# Patient Record
Sex: MALE | Race: WHITE | NOT HISPANIC OR LATINO | Employment: OTHER | URBAN - METROPOLITAN AREA
[De-identification: names, ages, dates, MRNs, and addresses within clinical notes are randomized per-mention and may not be internally consistent; named-entity substitution may affect disease eponyms.]

---

## 2023-02-23 LAB — H. PYLORI UBIT: NEGATIVE

## 2023-04-13 ENCOUNTER — HOSPITAL ENCOUNTER (OUTPATIENT)
Dept: DATA CONVERSION | Facility: HOSPITAL | Age: 78
End: 2023-04-13
Attending: ANESTHESIOLOGY | Admitting: ANESTHESIOLOGY
Payer: MEDICARE

## 2023-04-13 DIAGNOSIS — M54.2 CERVICALGIA: ICD-10-CM

## 2023-04-13 DIAGNOSIS — M48.062 SPINAL STENOSIS, LUMBAR REGION WITH NEUROGENIC CLAUDICATION: ICD-10-CM

## 2023-04-13 DIAGNOSIS — N40.0 BENIGN PROSTATIC HYPERPLASIA WITHOUT LOWER URINARY TRACT SYMPTOMS: ICD-10-CM

## 2023-04-13 DIAGNOSIS — J45.909 UNSPECIFIED ASTHMA, UNCOMPLICATED (HHS-HCC): ICD-10-CM

## 2023-04-13 DIAGNOSIS — K21.9 GASTRO-ESOPHAGEAL REFLUX DISEASE WITHOUT ESOPHAGITIS: ICD-10-CM

## 2023-04-13 DIAGNOSIS — E78.5 HYPERLIPIDEMIA, UNSPECIFIED: ICD-10-CM

## 2023-05-16 ENCOUNTER — TELEPHONE (OUTPATIENT)
Dept: PRIMARY CARE | Facility: CLINIC | Age: 78
End: 2023-05-16
Payer: MEDICARE

## 2023-05-16 NOTE — TELEPHONE ENCOUNTER
Pt. States is supposed to get a pain shot in pain management office, but he is on Avelox. Cannot get the shot while on Avelox.

## 2023-05-16 NOTE — TELEPHONE ENCOUNTER
Pt. States is suppose to get a pain shot in pain management but cannot get it while he is on Plavix. Asking if it is ok for him to be off of Plavix for one week, then he can get the shot.

## 2023-05-18 ENCOUNTER — OFFICE VISIT (OUTPATIENT)
Dept: PRIMARY CARE | Facility: CLINIC | Age: 78
End: 2023-05-18
Payer: MEDICARE

## 2023-05-18 VITALS
SYSTOLIC BLOOD PRESSURE: 118 MMHG | WEIGHT: 191.8 LBS | DIASTOLIC BLOOD PRESSURE: 76 MMHG | OXYGEN SATURATION: 96 % | HEART RATE: 56 BPM | BODY MASS INDEX: 25.48 KG/M2

## 2023-05-18 DIAGNOSIS — M25.562 CHRONIC PAIN OF LEFT KNEE: Primary | Chronic | ICD-10-CM

## 2023-05-18 DIAGNOSIS — Z87.898 H/O DIZZINESS: ICD-10-CM

## 2023-05-18 DIAGNOSIS — R47.01 APHASIA: ICD-10-CM

## 2023-05-18 DIAGNOSIS — G45.9 TIA (TRANSIENT ISCHEMIC ATTACK): ICD-10-CM

## 2023-05-18 DIAGNOSIS — E78.5 HYPERLIPIDEMIA, UNSPECIFIED HYPERLIPIDEMIA TYPE: ICD-10-CM

## 2023-05-18 DIAGNOSIS — M17.12 PRIMARY OSTEOARTHRITIS OF LEFT KNEE: ICD-10-CM

## 2023-05-18 DIAGNOSIS — G89.29 CHRONIC PAIN OF LEFT KNEE: Primary | Chronic | ICD-10-CM

## 2023-05-18 DIAGNOSIS — G45.0 VERTEBROBASILAR ARTERY INSUFFICIENCY: ICD-10-CM

## 2023-05-18 PROCEDURE — 1159F MED LIST DOCD IN RCRD: CPT | Performed by: INTERNAL MEDICINE

## 2023-05-18 PROCEDURE — 1036F TOBACCO NON-USER: CPT | Performed by: INTERNAL MEDICINE

## 2023-05-18 PROCEDURE — 20610 DRAIN/INJ JOINT/BURSA W/O US: CPT | Performed by: INTERNAL MEDICINE

## 2023-05-18 PROCEDURE — 99215 OFFICE O/P EST HI 40 MIN: CPT | Performed by: INTERNAL MEDICINE

## 2023-05-18 RX ORDER — PREDNISOLONE ACETATE 10 MG/ML
1 SUSPENSION/ DROPS OPHTHALMIC 4 TIMES DAILY
COMMUNITY
Start: 2023-05-16 | End: 2023-06-13

## 2023-05-18 RX ORDER — TIZANIDINE 4 MG/1
4 TABLET ORAL EVERY 6 HOURS PRN
COMMUNITY
Start: 2023-02-27 | End: 2023-12-28 | Stop reason: ALTCHOICE

## 2023-05-18 RX ORDER — FLUTICASONE PROPIONATE 50 MCG
1-2 SPRAY, SUSPENSION (ML) NASAL DAILY
COMMUNITY
Start: 2022-06-09

## 2023-05-18 RX ORDER — MELOXICAM 15 MG/1
15 TABLET ORAL DAILY PRN
COMMUNITY
End: 2023-08-03 | Stop reason: SDUPTHER

## 2023-05-18 RX ORDER — ALBUTEROL SULFATE 90 UG/1
2 AEROSOL, METERED RESPIRATORY (INHALATION)
COMMUNITY
Start: 2013-06-26

## 2023-05-18 RX ORDER — FLUOCINONIDE 0.5 MG/G
OINTMENT TOPICAL AS NEEDED
COMMUNITY
Start: 2022-06-09 | End: 2023-10-25 | Stop reason: ALTCHOICE

## 2023-05-18 RX ORDER — OMEPRAZOLE 20 MG/1
CAPSULE, DELAYED RELEASE ORAL
COMMUNITY
Start: 2022-09-30 | End: 2023-10-25 | Stop reason: ALTCHOICE

## 2023-05-18 RX ORDER — OMEPRAZOLE 20 MG/1
1 TABLET, DELAYED RELEASE ORAL DAILY
COMMUNITY

## 2023-05-18 RX ORDER — NAPROXEN SODIUM 220 MG/1
1 TABLET, FILM COATED ORAL DAILY
Qty: 29 TABLET | Refills: 0 | COMMUNITY
Start: 2023-05-10 | End: 2023-06-08

## 2023-05-18 RX ORDER — SILDENAFIL 100 MG/1
.5-1 TABLET, FILM COATED ORAL AS NEEDED
COMMUNITY
Start: 2015-05-15 | End: 2023-10-31 | Stop reason: SDUPTHER

## 2023-05-18 RX ORDER — ATORVASTATIN CALCIUM 40 MG/1
40 TABLET, FILM COATED ORAL DAILY
COMMUNITY
Start: 2023-05-10 | End: 2023-09-27 | Stop reason: SDUPTHER

## 2023-05-18 RX ORDER — PROPRANOLOL HYDROCHLORIDE 120 MG/1
120 CAPSULE, EXTENDED RELEASE ORAL
COMMUNITY
Start: 2022-12-04 | End: 2023-10-25 | Stop reason: ALTCHOICE

## 2023-05-18 RX ORDER — MONTELUKAST SODIUM 10 MG/1
1 TABLET ORAL DAILY
COMMUNITY
Start: 2018-05-23 | End: 2023-12-11 | Stop reason: SDUPTHER

## 2023-05-18 RX ORDER — MINERAL OIL
1 ENEMA (ML) RECTAL DAILY
COMMUNITY

## 2023-05-18 RX ORDER — AMITRIPTYLINE HYDROCHLORIDE 10 MG/1
10 TABLET, FILM COATED ORAL NIGHTLY
COMMUNITY
Start: 2023-05-15 | End: 2023-08-31 | Stop reason: ENTERED-IN-ERROR

## 2023-05-18 RX ORDER — AMLODIPINE BESYLATE 2.5 MG/1
2.5 TABLET ORAL DAILY
COMMUNITY
End: 2023-11-08 | Stop reason: SDUPTHER

## 2023-05-18 RX ORDER — MEMANTINE HYDROCHLORIDE 5 MG/1
5 TABLET ORAL 2 TIMES DAILY
COMMUNITY
Start: 2022-09-29 | End: 2024-01-03

## 2023-05-18 RX ORDER — AZELASTINE 1 MG/ML
2 SPRAY, METERED NASAL 2 TIMES DAILY
COMMUNITY
Start: 2018-05-23 | End: 2023-07-17 | Stop reason: SDUPTHER

## 2023-05-18 RX ORDER — TRAZODONE HYDROCHLORIDE 50 MG/1
50 TABLET ORAL AS NEEDED
COMMUNITY
Start: 2018-05-23 | End: 2023-12-04 | Stop reason: SDUPTHER

## 2023-05-18 RX ORDER — PROPRANOLOL HYDROCHLORIDE 10 MG/1
1 TABLET ORAL 3 TIMES DAILY
COMMUNITY
Start: 2023-02-06 | End: 2024-02-20 | Stop reason: SDUPTHER

## 2023-05-18 RX ORDER — DICLOFENAC SODIUM 10 MG/G
GEL TOPICAL 2 TIMES DAILY PRN
COMMUNITY
Start: 2023-02-07

## 2023-05-18 RX ORDER — TOPIRAMATE 25 MG/1
1-2 TABLET ORAL NIGHTLY PRN
COMMUNITY
Start: 2023-05-15 | End: 2024-03-12 | Stop reason: SDUPTHER

## 2023-05-18 RX ORDER — CLOPIDOGREL BISULFATE 75 MG/1
1 TABLET ORAL DAILY
COMMUNITY
Start: 2023-05-10 | End: 2023-09-27 | Stop reason: SDUPTHER

## 2023-05-18 ASSESSMENT — ENCOUNTER SYMPTOMS
MUSCLE WEAKNESS: 0
CONSTITUTIONAL NEGATIVE: 1
TINGLING: 0
SPEECH DIFFICULTY: 0
DIZZINESS: 1
HEADACHES: 0
INABILITY TO BEAR WEIGHT: 1
LOSS OF SENSATION: 0
NUMBNESS: 0
LOSS OF MOTION: 0
WEAKNESS: 0
LIGHT-HEADEDNESS: 0
FACIAL ASYMMETRY: 0

## 2023-05-18 ASSESSMENT — PATIENT HEALTH QUESTIONNAIRE - PHQ9
SUM OF ALL RESPONSES TO PHQ9 QUESTIONS 1 AND 2: 0
1. LITTLE INTEREST OR PLEASURE IN DOING THINGS: NOT AT ALL
2. FEELING DOWN, DEPRESSED OR HOPELESS: NOT AT ALL

## 2023-05-18 NOTE — PROGRESS NOTES
Patient ID: Hank Rick is a 77 y.o. male who presents for Hospital Follow-up (For tia).    /76   Pulse 56   Wt 87 kg (191 lb 12.8 oz)   SpO2 96%   BMI 25.48 kg/m²     Knee Pain   Incident onset: many years before , chronic. Incident location: no particular location. The injury mechanism is unknown. The pain is present in the left knee. The quality of the pain is described as aching. The pain is at a severity of 6/10. The pain is moderate. The pain has been Fluctuating since onset. Associated symptoms include an inability to bear weight. Pertinent negatives include no loss of motion, loss of sensation, muscle weakness, numbness or tingling. The symptoms are aggravated by movement and weight bearing. He has tried acetaminophen and NSAIDs for the symptoms. The treatment provided mild relief.       Subjective     Review of Systems   Constitutional: Negative.    Musculoskeletal:         Left knee pain , hurts to walk or stand on it for long time , no swelling  cortisone injection helps    Neurological:  Positive for dizziness. Negative for tingling, facial asymmetry, speech difficulty, weakness, light-headedness, numbness and headaches.   All other systems reviewed and are negative.      Objective     Physical Exam  Vitals and nursing note reviewed.   Constitutional:       Appearance: Normal appearance.   Neck:      Vascular: No carotid bruit.   Cardiovascular:      Rate and Rhythm: Normal rate and regular rhythm.      Pulses: Normal pulses.      Heart sounds: Normal heart sounds. No murmur heard.  Pulmonary:      Effort: Pulmonary effort is normal.      Breath sounds: Normal breath sounds.   Musculoskeletal:      Comments: Left knee rom painful , coarse crepitations noted , no tenderness , no erythema    Neurological:      General: No focal deficit present.      Mental Status: He is oriented to person, place, and time. Mental status is at baseline.      Gait: Gait normal.      Deep Tendon Reflexes:  Reflexes normal.   Psychiatric:         Mood and Affect: Mood normal.         Behavior: Behavior normal.         Thought Content: Thought content normal.         Judgment: Judgment normal.         Lab Results   Component Value Date    WBC 7.6 05/12/2023    HGB 13.9 05/12/2023    HCT 40.9 (L) 05/12/2023    MCV 92 05/12/2023     05/12/2023           Problem List Items Addressed This Visit          Nervous    Chronic pain of left knee - Primary       Circulatory    Vertebrobasilar artery insufficiency       Musculoskeletal    Primary osteoarthritis of left knee       Other    H/O dizziness     Other Visit Diagnoses       TIA (transient ischemic attack)        Relevant Orders    Vascular US carotid artery duplex bilateral    Aphasia        Relevant Orders    Vascular US carotid artery duplex bilateral    Hyperlipidemia, unspecified hyperlipidemia type        Relevant Orders    Lipid panel                A/P         WITH PT'S CONSENT AND AFTER STERILIZING THE SKIN OF THE LEFT KNEE WITH ALCOHOL SWABX 4 AND ADEQUATELY SPRAYING WITH TOPICAL ANESTHETIC , INJECTION KENALOG 80 + 1 ml 1% LIDOCAINE INJECTED INTO THE AFFECTED KNEE, PT TOLERATED THE PROCEDURE WELL , NO COMPLICATIONS NOTED AND PAIN IS RELIEVED       Patient ID: Hank Rick is a 77 y.o. male.    Joint Injection Large/Arthrocentesis: L knee on 5/18/2023 1:25 PM  Indications: pain  Details: 22 G needle, anteromedial approach (guidance: MANUAL GUIDANCE )  Medications: (With patient's consent and after sterilizing the skin of the left knee adequately with alcohol swab x4 and after spraying with topical anesthetic adequately to the affected area of the left knee injection Kenalog 80 mg+ 1 cc 1% lidocaine injected into the left knee patient tolerated the procedure very well relieved of the pain no complications noted)  Outcome: tolerated well, no immediate complications  Procedure, treatment alternatives, risks and benefits explained, specific risks discussed.  Consent was given by the patient.       Follow up if needed

## 2023-05-19 ENCOUNTER — HOSPITAL ENCOUNTER (OUTPATIENT)
Dept: DATA CONVERSION | Facility: HOSPITAL | Age: 78
End: 2023-05-19
Attending: ANESTHESIOLOGY
Payer: MEDICARE

## 2023-05-19 DIAGNOSIS — M54.16 RADICULOPATHY, LUMBAR REGION: ICD-10-CM

## 2023-05-19 DIAGNOSIS — Z96.619 PRESENCE OF UNSPECIFIED ARTIFICIAL SHOULDER JOINT: ICD-10-CM

## 2023-05-19 DIAGNOSIS — R52 PAIN, UNSPECIFIED: ICD-10-CM

## 2023-05-19 DIAGNOSIS — M48.061 SPINAL STENOSIS, LUMBAR REGION WITHOUT NEUROGENIC CLAUDICATION: ICD-10-CM

## 2023-06-06 ENCOUNTER — HOSPITAL ENCOUNTER (OUTPATIENT)
Dept: DATA CONVERSION | Facility: HOSPITAL | Age: 78
End: 2023-06-06
Attending: ANESTHESIOLOGY
Payer: MEDICARE

## 2023-06-06 DIAGNOSIS — M54.16 RADICULOPATHY, LUMBAR REGION: ICD-10-CM

## 2023-06-06 DIAGNOSIS — M48.061 SPINAL STENOSIS, LUMBAR REGION WITHOUT NEUROGENIC CLAUDICATION: ICD-10-CM

## 2023-06-23 ENCOUNTER — HOSPITAL ENCOUNTER (OUTPATIENT)
Dept: DATA CONVERSION | Facility: HOSPITAL | Age: 78
End: 2023-06-23
Attending: ANESTHESIOLOGY
Payer: MEDICARE

## 2023-06-23 DIAGNOSIS — M54.16 RADICULOPATHY, LUMBAR REGION: ICD-10-CM

## 2023-06-23 DIAGNOSIS — M48.061 SPINAL STENOSIS, LUMBAR REGION WITHOUT NEUROGENIC CLAUDICATION: ICD-10-CM

## 2023-07-17 DIAGNOSIS — J30.9 CHRONIC ALLERGIC RHINITIS: Primary | ICD-10-CM

## 2023-07-17 RX ORDER — AZELASTINE 1 MG/ML
2 SPRAY, METERED NASAL 2 TIMES DAILY
Qty: 30 ML | Refills: 2 | Status: SHIPPED | OUTPATIENT
Start: 2023-07-17 | End: 2023-10-04 | Stop reason: SDUPTHER

## 2023-07-20 ENCOUNTER — OFFICE VISIT (OUTPATIENT)
Dept: PRIMARY CARE | Facility: CLINIC | Age: 78
End: 2023-07-20
Payer: MEDICARE

## 2023-07-20 VITALS
OXYGEN SATURATION: 95 % | WEIGHT: 195.6 LBS | DIASTOLIC BLOOD PRESSURE: 80 MMHG | SYSTOLIC BLOOD PRESSURE: 130 MMHG | BODY MASS INDEX: 25.98 KG/M2 | HEART RATE: 56 BPM

## 2023-07-20 DIAGNOSIS — G45.0 VERTEBROBASILAR ARTERY INSUFFICIENCY: Primary | ICD-10-CM

## 2023-07-20 DIAGNOSIS — M25.471 SWELLING OF BOTH ANKLES: ICD-10-CM

## 2023-07-20 DIAGNOSIS — M25.472 SWELLING OF BOTH ANKLES: ICD-10-CM

## 2023-07-20 DIAGNOSIS — Z87.898 H/O DIZZINESS: ICD-10-CM

## 2023-07-20 PROCEDURE — 1036F TOBACCO NON-USER: CPT | Performed by: INTERNAL MEDICINE

## 2023-07-20 PROCEDURE — 1160F RVW MEDS BY RX/DR IN RCRD: CPT | Performed by: INTERNAL MEDICINE

## 2023-07-20 PROCEDURE — 1159F MED LIST DOCD IN RCRD: CPT | Performed by: INTERNAL MEDICINE

## 2023-07-20 PROCEDURE — 99212 OFFICE O/P EST SF 10 MIN: CPT | Performed by: INTERNAL MEDICINE

## 2023-07-20 PROCEDURE — 1126F AMNT PAIN NOTED NONE PRSNT: CPT | Performed by: INTERNAL MEDICINE

## 2023-07-20 RX ORDER — PROPRANOLOL HYDROCHLORIDE 60 MG/1
60 CAPSULE, EXTENDED RELEASE ORAL DAILY
COMMUNITY
Start: 2023-06-30 | End: 2023-12-15 | Stop reason: SDUPTHER

## 2023-07-20 ASSESSMENT — PATIENT HEALTH QUESTIONNAIRE - PHQ9
SUM OF ALL RESPONSES TO PHQ9 QUESTIONS 1 AND 2: 0
2. FEELING DOWN, DEPRESSED OR HOPELESS: NOT AT ALL
1. LITTLE INTEREST OR PLEASURE IN DOING THINGS: NOT AT ALL

## 2023-07-20 ASSESSMENT — ENCOUNTER SYMPTOMS: CONSTITUTIONAL NEGATIVE: 1

## 2023-07-20 NOTE — PROGRESS NOTES
Patient ID: Hank Rick is a 78 y.o. male who presents for Joint Swelling (Both ankles are swollen).    /80   Pulse 56   Wt 88.7 kg (195 lb 9.6 oz)   SpO2 95%   BMI 25.98 kg/m²     HPI      BILATERAL ANKLE SWELLING   NO PND, NO ORTHOPNEA   LEG SWELLING BETTER   AFTER KEEPING IT ELEVATED       Subjective     Review of Systems   Constitutional: Negative.    Cardiovascular:  Positive for leg swelling.   All other systems reviewed and are negative.      Objective     Physical Exam    Lab Results   Component Value Date    WBC 7.6 05/12/2023    HGB 13.9 05/12/2023    HCT 40.9 (L) 05/12/2023    MCV 92 05/12/2023     05/12/2023           Problem List Items Addressed This Visit       H/O dizziness    Vertebrobasilar artery insufficiency - Primary    Swelling of both ankles            A/P       ADVISED TO KEEP THE AFFECTED LEGS ELEVATED   ADVISED TO USE BELOW KNEE COMPRESSION STOCKINGS   ADVISED TO AVOID EXTRA SALT

## 2023-08-03 DIAGNOSIS — M17.12 PRIMARY OSTEOARTHRITIS OF LEFT KNEE: ICD-10-CM

## 2023-08-03 DIAGNOSIS — G89.29 CHRONIC PAIN OF LEFT KNEE: ICD-10-CM

## 2023-08-03 DIAGNOSIS — M25.562 CHRONIC PAIN OF LEFT KNEE: ICD-10-CM

## 2023-08-03 RX ORDER — MELOXICAM 15 MG/1
15 TABLET ORAL DAILY PRN
Qty: 30 TABLET | Refills: 1 | Status: SHIPPED | OUTPATIENT
Start: 2023-08-03 | End: 2023-10-04

## 2023-08-21 ENCOUNTER — HOSPITAL ENCOUNTER (OUTPATIENT)
Dept: DATA CONVERSION | Facility: HOSPITAL | Age: 78
End: 2023-08-21

## 2023-08-21 DIAGNOSIS — G47.33 OBSTRUCTIVE SLEEP APNEA (ADULT) (PEDIATRIC): ICD-10-CM

## 2023-08-25 ENCOUNTER — PATIENT OUTREACH (OUTPATIENT)
Dept: PRIMARY CARE | Facility: CLINIC | Age: 78
End: 2023-08-25
Payer: MEDICARE

## 2023-08-25 RX ORDER — LIDOCAINE 40 MG/G
1 CREAM TOPICAL DAILY
COMMUNITY
End: 2023-10-25 | Stop reason: ALTCHOICE

## 2023-08-25 RX ORDER — SENNOSIDES 8.6 MG/1
1 TABLET ORAL DAILY
COMMUNITY
End: 2023-10-25 | Stop reason: ALTCHOICE

## 2023-08-25 RX ORDER — BENZONATATE 100 MG/1
100 CAPSULE ORAL 3 TIMES DAILY PRN
COMMUNITY
End: 2023-10-25 | Stop reason: ALTCHOICE

## 2023-08-25 RX ORDER — DOCUSATE SODIUM 100 MG/1
100 CAPSULE, LIQUID FILLED ORAL 2 TIMES DAILY
Status: ON HOLD | COMMUNITY
End: 2023-11-06 | Stop reason: ALTCHOICE

## 2023-08-25 NOTE — PROGRESS NOTES
Discharge Facility:  [facility]  Discharge Diagnosis:   Syncope  Admission Date:  8/21/23  Discharge Date:   8/24/23    PCP Appointment Date:  8/31/23    Specialist Appointment Date:   Physician/Dept/Service:   Dr Darek Sánchez / Neurology          Scheduled Date/Time:   07-Sep-2023 14:30          Location:   [address] [place] [address] ,  [place] [id number] Fax:[phone]          Phone Number:   [phone]    Physician/Dept/Service:   Ortho Surgery- Dr Red    Physician/Dept/Service:   Pain Medicine-Dr Flores          Scheduled Date/Time:   30-Aug-2023 11:15          Location:   [facility] [address] [place] [address]    Physician/Dept/Service:   Orthopedics          Scheduled Date/Time:   25-Sep-2023 08:20          Location:   [facility] [place]    Physician/Dept/Service:   Otolaryngology-Luiz          Scheduled Date/Time:   28-Sep-2023 11:00          Location:   Otolaryngology [facility] Encounter and Summary: Linked   See discharge assessment below for further details     Engagement  Call Start Time: 0942 (8/25/2023  9:43 AM)    Medications  Medications reviewed with patient/caregiver?: Yes (8/25/2023  9:43 AM)  Is the patient having any side effects they believe may be caused by any medication additions or changes?: No (8/25/2023  9:43 AM)  Does the patient have all medications ordered at discharge?: Yes (8/25/2023  9:43 AM)  Is the patient taking all medications as directed (includes completed medication regime)?: Yes (8/25/2023  9:43 AM)  Medication Comments: reviewed new, changed, and discontinued meds.  benzonatate, lidocaine, colace, and senna are new. (8/25/2023  9:43 AM)    Appointments  Does the patient have a primary care provider?: Yes (8/25/2023  9:43 AM)  Care Management Interventions: -- (appt made 8/31) (8/25/2023  9:43 AM)  Has the patient kept scheduled appointments due by today?: Yes (8/25/2023  9:43 AM)  Care Management Interventions: Educated on  importance of keeping appointment (8/25/2023  9:43 AM)    Self Management  What is the home health agency?: [facility] (8/25/2023  9:43 AM)  Has home health visited the patient within 72 hours of discharge?: Yes (8/25/2023  9:43 AM)    Patient Teaching  Does the patient have access to their discharge instructions?: Yes (8/25/2023  9:43 AM)  What is the patient's perception of their health status since discharge?: Improving (8/25/2023  9:43 AM)  Is the patient/caregiver able to teach back the hierarchy of who to call/visit for symptoms/problems? PCP, Specialist, Home Health nurse, Urgent Care, ED, 911: Yes (8/25/2023  9:43 AM)  Patient/Caregiver Education Comments: states doing better since discharge.  has all needed meds.  no questions or concerns at this time.  aware to call if needed. (8/25/2023  9:43 AM)

## 2023-08-31 ENCOUNTER — OFFICE VISIT (OUTPATIENT)
Dept: PRIMARY CARE | Facility: CLINIC | Age: 78
End: 2023-08-31
Payer: MEDICARE

## 2023-08-31 VITALS
HEART RATE: 56 BPM | BODY MASS INDEX: 25.19 KG/M2 | WEIGHT: 189.6 LBS | OXYGEN SATURATION: 96 % | DIASTOLIC BLOOD PRESSURE: 78 MMHG | SYSTOLIC BLOOD PRESSURE: 136 MMHG

## 2023-08-31 DIAGNOSIS — E78.5 HYPERLIPIDEMIA, UNSPECIFIED HYPERLIPIDEMIA TYPE: ICD-10-CM

## 2023-08-31 DIAGNOSIS — I65.02 VERTEBRAL ARTERY STENOSIS/OCCLUSION, LEFT: ICD-10-CM

## 2023-08-31 DIAGNOSIS — E87.1 HYPONATREMIA: ICD-10-CM

## 2023-08-31 DIAGNOSIS — G45.0 VERTEBROBASILAR ARTERY INSUFFICIENCY: Primary | ICD-10-CM

## 2023-08-31 DIAGNOSIS — I10 PRIMARY HYPERTENSION: ICD-10-CM

## 2023-08-31 DIAGNOSIS — Z87.898 H/O DIZZINESS: ICD-10-CM

## 2023-08-31 DIAGNOSIS — R35.0 URINARY FREQUENCY: ICD-10-CM

## 2023-08-31 LAB
ANION GAP IN SER/PLAS: 13 MMOL/L (ref 10–20)
CALCIUM (MG/DL) IN SER/PLAS: 9.7 MG/DL (ref 8.6–10.6)
CARBON DIOXIDE, TOTAL (MMOL/L) IN SER/PLAS: 28 MMOL/L (ref 21–32)
CHLORIDE (MMOL/L) IN SER/PLAS: 100 MMOL/L (ref 98–107)
CHOLESTEROL (MG/DL) IN SER/PLAS: 151 MG/DL (ref 0–199)
CHOLESTEROL IN HDL (MG/DL) IN SER/PLAS: 39.7 MG/DL
CHOLESTEROL/HDL RATIO: 3.8
CREATININE (MG/DL) IN SER/PLAS: 1.09 MG/DL (ref 0.5–1.3)
GFR MALE: 69 ML/MIN/1.73M2
GLUCOSE (MG/DL) IN SER/PLAS: 123 MG/DL (ref 74–99)
LDL: 83 MG/DL (ref 0–99)
POTASSIUM (MMOL/L) IN SER/PLAS: 4.5 MMOL/L (ref 3.5–5.3)
SODIUM (MMOL/L) IN SER/PLAS: 136 MMOL/L (ref 136–145)
TRIGLYCERIDE (MG/DL) IN SER/PLAS: 144 MG/DL (ref 0–149)
UREA NITROGEN (MG/DL) IN SER/PLAS: 17 MG/DL (ref 6–23)
VLDL: 29 MG/DL (ref 0–40)

## 2023-08-31 PROCEDURE — 1126F AMNT PAIN NOTED NONE PRSNT: CPT | Performed by: INTERNAL MEDICINE

## 2023-08-31 PROCEDURE — 80048 BASIC METABOLIC PNL TOTAL CA: CPT

## 2023-08-31 PROCEDURE — 1160F RVW MEDS BY RX/DR IN RCRD: CPT | Performed by: INTERNAL MEDICINE

## 2023-08-31 PROCEDURE — 99215 OFFICE O/P EST HI 40 MIN: CPT | Performed by: INTERNAL MEDICINE

## 2023-08-31 PROCEDURE — 3078F DIAST BP <80 MM HG: CPT | Performed by: INTERNAL MEDICINE

## 2023-08-31 PROCEDURE — 80061 LIPID PANEL: CPT

## 2023-08-31 PROCEDURE — 1036F TOBACCO NON-USER: CPT | Performed by: INTERNAL MEDICINE

## 2023-08-31 PROCEDURE — 3075F SYST BP GE 130 - 139MM HG: CPT | Performed by: INTERNAL MEDICINE

## 2023-08-31 PROCEDURE — 1159F MED LIST DOCD IN RCRD: CPT | Performed by: INTERNAL MEDICINE

## 2023-08-31 ASSESSMENT — ENCOUNTER SYMPTOMS
SPEECH DIFFICULTY: 0
DIZZINESS: 0
LIGHT-HEADEDNESS: 0
NUMBNESS: 0
CONSTITUTIONAL NEGATIVE: 1
HEADACHES: 0
TREMORS: 1
SEIZURES: 0

## 2023-08-31 ASSESSMENT — PATIENT HEALTH QUESTIONNAIRE - PHQ9
SUM OF ALL RESPONSES TO PHQ9 QUESTIONS 1 AND 2: 0
1. LITTLE INTEREST OR PLEASURE IN DOING THINGS: NOT AT ALL
SUM OF ALL RESPONSES TO PHQ9 QUESTIONS 1 AND 2: 0
1. LITTLE INTEREST OR PLEASURE IN DOING THINGS: NOT AT ALL
2. FEELING DOWN, DEPRESSED OR HOPELESS: NOT AT ALL
2. FEELING DOWN, DEPRESSED OR HOPELESS: NOT AT ALL

## 2023-08-31 NOTE — PROGRESS NOTES
Patient ID: Hank Rick is a 78 y.o. male who presents for Hospital Follow-up (Fell on Sunday,had some scrapes and cuts. He was talking incoherently, and tested positive for covid).    /78   Pulse 56   Wt 86 kg (189 lb 9.6 oz)   SpO2 96%   BMI 25.19 kg/m²     HPI      PT SEEN IN THE EMERGENCY AT [facility] AT 03:49 AM   FOR UNWITNESSED FALL, NO LOC AND INCOHERENT SPEECH   NO WITNESSED SEIZURE , NO DYSARTHRIA, NO EXTREMITY WEAKNESS  NO BLURRY VISION , NO DOUBLE VISION     PT HAD TIA ON 05/23/2023     NO DM , HTN STABLE     NO FHX OF STROKE     PT HAD CT ANGIO HEAD/NECK WAND W/O IV CONTRAST LEFT VERTEBRAL ARTERY STENOSIS     PT HAD HOLTER MONITOR FOR 14 DAYS   HE HAS APPT WITH NEUROLOGY ON 09/07/2023       Subjective     Review of Systems   Constitutional: Negative.    Neurological:  Positive for tremors. Negative for dizziness, seizures, speech difficulty, light-headedness, numbness and headaches.   All other systems reviewed and are negative.      Objective     Physical Exam  Vitals and nursing note reviewed.   Cardiovascular:      Rate and Rhythm: Normal rate and regular rhythm.      Pulses: Normal pulses.      Heart sounds: Normal heart sounds. No murmur heard.        Lab Results   Component Value Date    WBC 8.7 08/24/2023    HGB 12.5 (L) 08/24/2023    HCT 36.9 (L) 08/24/2023    MCV 94 08/24/2023     08/24/2023           Problem List Items Addressed This Visit       H/O dizziness    Vertebrobasilar artery insufficiency - Primary    Vertebral artery stenosis/occlusion, left    Urinary frequency    Relevant Orders    Referral to Urology    Primary hypertension    Hyponatremia    Relevant Orders    Basic metabolic panel           A/P       RECORD FROM THE HOSPITAL REVIEWED   PT SCHEDULED TO SEE NEUROLOGY ON 09/07/2023  TO CONTINUE ASPIRIN 81MG 1 PILL EVERY DAY   TO CONTINUE PLAVIX 75MG 1 PILL EVERY DAY   PT ON HOLTER MONITOR FOR 14 DAYS   TO DC AMITRIPTYLINE   PT MOST LIKELY WILL NEED EEG  FOLLOW UP IF  NEEDED

## 2023-09-01 ENCOUNTER — PATIENT OUTREACH (OUTPATIENT)
Dept: PRIMARY CARE | Facility: CLINIC | Age: 78
End: 2023-09-01
Payer: MEDICARE

## 2023-09-07 VITALS — BODY MASS INDEX: 25.71 KG/M2 | HEIGHT: 73 IN | WEIGHT: 194 LBS

## 2023-09-13 ENCOUNTER — APPOINTMENT (OUTPATIENT)
Dept: PRIMARY CARE | Facility: CLINIC | Age: 78
End: 2023-09-13
Payer: MEDICARE

## 2023-09-14 NOTE — H&P
History of Present Illness:   History Present Illness:  Reason for surgery: Interspinous spacer   HPI:    77 year old male presents with low back pain. He has a history of cervical spondylosis and radicular pain to the right upper extremity plus lumbar  stenosis with neurogenic claudication and with radicular symptoms. He underwent L4 transformational epidural steroid injections on 12/6/22. His most recent lumbar MRI showed moderate central canal stenosis at L3/4 and L4/5.      He has responded to steroid injections in both cervical and lumbar regions in the past however patient reports last injection in December did not provide significant relief  of back pain. Despite this, he does report that radicular leg symptoms are less (he reports only occasional tingling in the right anterior thigh). He denies significant weakness in upper or lower extremities. Patient reports rotator cuff damage and had  recently underwent surgery. He has seen Dr. Orr previously for back and radicular pain who suggested deferring surgery until other less invasive options pursued. Patient reports pain today is mostly in low back and right shoulder. He states that he only  takes Meloxicam and Tylenol for pain currently and that Gabapentin was tried in the past but caused him significant agitation he has tried topiramate in the past as well without significant relief.    Allergies:        Allergies:  ·  No Known Allergies :        Intolerances:  ·  gabapentin : Anxiety    Home Medication Review:   Home Medications Reviewed: yes     Impression/Procedure:   ·  Impression and Planned Procedure: vertiflex       ERAS (Enhanced Recovery After Surgery):  ·  ERAS Patient: no       Physical Exam by System:    Constitutional: Well developed, awake/alert/oriented  x3, no distress, alert and cooperative   Eyes: PERRL, EOMI, clear sclera   ENMT: mucous membranes moist, no apparent injury,  no lesions seen   Head/Neck: Neck supple, no apparent injury,  thyroid  without mass or tenderness, No JVD, trachea midline, no bruits   Respiratory/Thorax: Patent airways, CTAB, normal  breath sounds with good chest expansion, thorax symmetric   Cardiovascular: Regular, rate and rhythm, no murmurs,  2+ equal pulses of the extremities, normal S 1and S 2   Gastrointestinal: Nondistended, soft, non-tender,  no rebound tenderness or guarding, no masses palpable, no organomegaly, +BS, no bruits   Extremities: normal extremities, no cyanosis edema,  contusions or wounds, no clubbing   Neurological: alert and oriented x3, intact senses,  motor, response and reflexes, normal strength   Skin: Warm and dry, no lesions, no rashes     Consent:   COVID-19 Consent:  ·  COVID-19 Risk Consent Surgeon has reviewed key risks related to the risk of beto COVID-19 and if they contract COVID-19 what the risks are.     Attestation:   Note Completion:  I am a:  Resident/Fellow   Attending Attestation I saw and evaluated the patient.  I personally obtained the key and critical portions of the history and physical exam or was physically present for key and  critical portions performed by the resident/fellow. I reviewed the resident/fellow?s documentation and discussed the patient with the resident/fellow.  I agree with the resident/fellow?s medical decision making as documented in the note.   I personally evaluated the patient on 13-Apr-2023         Electronic Signatures:  Angie Carreon (Fellow))   (Signed 13-Apr-2023 09:40)   Authored: History of Present Illness, Allergies, Home Medication Review, Impression/Procedure, Physical Exam, ERAS, Consent, Note Completion  Arben Flores)   (Signed 13-Apr-2023 13:58)   Co-Signer: History of Present Illness, Allergies, Home Medication Review, Impression/Procedure, Physical Exam, ERAS, Consent, Note Completion    Last Updated: 13-Apr-2023 13:58 by Arben Flores)

## 2023-09-15 ENCOUNTER — HOSPITAL ENCOUNTER (OUTPATIENT)
Facility: CLINIC | Age: 78
Setting detail: OUTPATIENT SURGERY
End: 2023-09-15
Payer: MEDICARE

## 2023-09-25 ENCOUNTER — PATIENT OUTREACH (OUTPATIENT)
Dept: PRIMARY CARE | Facility: CLINIC | Age: 78
End: 2023-09-25
Payer: MEDICARE

## 2023-09-25 NOTE — PROGRESS NOTES
Call placed regarding one month post discharge follow up call.  At time of outreach call the patient feels as if their condition has improved since initial visit with PCP or specialist.  Denies questions or concerns at this time.  Aware to call if needs arise.

## 2023-09-27 ENCOUNTER — TELEPHONE (OUTPATIENT)
Dept: PRIMARY CARE | Facility: CLINIC | Age: 78
End: 2023-09-27
Payer: MEDICARE

## 2023-09-27 DIAGNOSIS — E78.5 HYPERLIPIDEMIA, UNSPECIFIED HYPERLIPIDEMIA TYPE: Primary | ICD-10-CM

## 2023-09-27 DIAGNOSIS — G45.9 TIA (TRANSIENT ISCHEMIC ATTACK): ICD-10-CM

## 2023-09-27 DIAGNOSIS — Z86.79: ICD-10-CM

## 2023-09-27 RX ORDER — ATORVASTATIN CALCIUM 40 MG/1
40 TABLET, FILM COATED ORAL DAILY
Qty: 90 TABLET | Refills: 2 | Status: SHIPPED | OUTPATIENT
Start: 2023-09-27 | End: 2024-04-29

## 2023-09-27 RX ORDER — CLOPIDOGREL BISULFATE 75 MG/1
75 TABLET ORAL DAILY
Qty: 90 TABLET | Refills: 2 | Status: SHIPPED | OUTPATIENT
Start: 2023-09-27 | End: 2024-01-04

## 2023-09-27 NOTE — TELEPHONE ENCOUNTER
Pt. Requesting refill of Atorvastatin. Also is asking if he should be on Clopidogrel. States he had stopped taking it for some reason, he can't remember, but  they restarted it at the hospital on 8/24/23.

## 2023-09-29 PROBLEM — K22.2 ESOPHAGEAL STRICTURE: Status: ACTIVE | Noted: 2023-09-29

## 2023-09-29 PROBLEM — M47.812 FACET ARTHROPATHY, CERVICAL: Status: ACTIVE | Noted: 2023-09-29

## 2023-09-29 PROBLEM — R79.89 ELEVATED SERUM CREATININE: Status: ACTIVE | Noted: 2023-09-29

## 2023-09-29 PROBLEM — K63.5 SERRATED POLYP OF COLON: Status: ACTIVE | Noted: 2023-09-29

## 2023-09-29 PROBLEM — R35.1 NOCTURIA: Status: ACTIVE | Noted: 2023-09-29

## 2023-09-29 PROBLEM — R13.10 DYSPHAGIA: Status: ACTIVE | Noted: 2023-09-29

## 2023-09-29 PROBLEM — R41.89 COGNITIVE CHANGES: Status: ACTIVE | Noted: 2023-09-29

## 2023-09-29 PROBLEM — D64.9 ANEMIA, UNSPECIFIED: Status: ACTIVE | Noted: 2023-09-29

## 2023-09-29 PROBLEM — N41.9 PROSTATITIS: Status: ACTIVE | Noted: 2023-09-29

## 2023-09-29 PROBLEM — I63.9 CEREBROVASCULAR ACCIDENT (CVA) (MULTI): Status: ACTIVE | Noted: 2023-09-29

## 2023-09-29 PROBLEM — M43.02 CERVICAL SPONDYLOLYSIS: Status: ACTIVE | Noted: 2023-09-29

## 2023-09-29 PROBLEM — T14.8XXA BLEEDING FROM WOUND: Status: ACTIVE | Noted: 2023-09-29

## 2023-09-29 PROBLEM — R39.15 BENIGN PROSTATIC HYPERPLASIA (BPH) WITH URINARY URGENCY: Status: ACTIVE | Noted: 2023-09-29

## 2023-09-29 PROBLEM — M54.6 THORACIC BACK PAIN: Status: ACTIVE | Noted: 2023-09-29

## 2023-09-29 PROBLEM — L08.1 ERYTHRASMA: Status: ACTIVE | Noted: 2023-09-29

## 2023-09-29 PROBLEM — S61.259A DOG BITE OF FINGER: Status: ACTIVE | Noted: 2023-09-29

## 2023-09-29 PROBLEM — J30.2 SEASONAL ALLERGIES: Status: ACTIVE | Noted: 2023-09-29

## 2023-09-29 PROBLEM — N40.1 BENIGN NON-NODULAR PROSTATIC HYPERPLASIA WITH LOWER URINARY TRACT SYMPTOMS: Status: ACTIVE | Noted: 2023-09-29

## 2023-09-29 PROBLEM — R41.840 COVID-19 LONG HAULER MANIFESTING CHRONIC CONCENTRATION DEFICIT: Status: ACTIVE | Noted: 2023-09-29

## 2023-09-29 PROBLEM — F02.80 MIXED ALZHEIMER'S AND VASCULAR DEMENTIA (MULTI): Status: ACTIVE | Noted: 2023-09-29

## 2023-09-29 PROBLEM — S60.419A FINGER ABRASION: Status: ACTIVE | Noted: 2023-09-29

## 2023-09-29 PROBLEM — M25.511 SHOULDER PAIN, RIGHT: Status: ACTIVE | Noted: 2023-09-29

## 2023-09-29 PROBLEM — R55 SYNCOPE: Status: ACTIVE | Noted: 2023-09-29

## 2023-09-29 PROBLEM — S61.419A LACERATION OF HAND: Status: ACTIVE | Noted: 2023-09-29

## 2023-09-29 PROBLEM — R79.89 ELEVATED FERRITIN: Status: ACTIVE | Noted: 2023-09-29

## 2023-09-29 PROBLEM — G25.0 ESSENTIAL TREMOR: Status: ACTIVE | Noted: 2023-09-29

## 2023-09-29 PROBLEM — K29.70 GASTRITIS: Status: ACTIVE | Noted: 2023-09-29

## 2023-09-29 PROBLEM — H26.9 INCIPIENT CATARACT: Status: ACTIVE | Noted: 2018-01-06

## 2023-09-29 PROBLEM — L70.9 ACNE: Status: ACTIVE | Noted: 2023-09-29

## 2023-09-29 PROBLEM — F51.04 CHRONIC INSOMNIA: Status: ACTIVE | Noted: 2023-09-29

## 2023-09-29 PROBLEM — G47.00 INSOMNIA: Status: ACTIVE | Noted: 2023-09-29

## 2023-09-29 PROBLEM — M54.16 LUMBAR RADICULOPATHY: Status: ACTIVE | Noted: 2023-09-29

## 2023-09-29 PROBLEM — K21.9 GERD (GASTROESOPHAGEAL REFLUX DISEASE): Status: ACTIVE | Noted: 2023-09-29

## 2023-09-29 PROBLEM — M48.062 LUMBAR STENOSIS WITH NEUROGENIC CLAUDICATION: Status: ACTIVE | Noted: 2023-09-29
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
## 2023-09-29 PROBLEM — R26.81 UNSTEADY GAIT: Status: ACTIVE | Noted: 2023-09-29

## 2023-09-29 PROBLEM — F01.50 MIXED ALZHEIMER'S AND VASCULAR DEMENTIA (MULTI): Status: ACTIVE | Noted: 2023-09-29

## 2023-09-29 PROBLEM — R41.3 MEMORY LOSS: Status: ACTIVE | Noted: 2023-09-29

## 2023-09-29 PROBLEM — G30.9 MIXED ALZHEIMER'S AND VASCULAR DEMENTIA (MULTI): Status: ACTIVE | Noted: 2023-09-29

## 2023-09-29 PROBLEM — H10.89 GPC (GIANT PAPILLARY CONJUNCTIVITIS): Status: ACTIVE | Noted: 2018-01-06

## 2023-09-29 PROBLEM — S39.012A LUMBAR STRAIN: Status: ACTIVE | Noted: 2023-09-29
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
## 2023-09-29 PROBLEM — M79.18 MYOFASCIAL PAIN SYNDROME, CERVICAL: Status: ACTIVE | Noted: 2023-09-29

## 2023-09-29 PROBLEM — R21 SKIN RASH: Status: ACTIVE | Noted: 2023-09-29

## 2023-09-29 PROBLEM — R35.0 INCREASED URINARY FREQUENCY: Status: ACTIVE | Noted: 2023-09-29

## 2023-09-29 PROBLEM — G47.30 SLEEP APNEA: Status: ACTIVE | Noted: 2023-09-29

## 2023-09-29 PROBLEM — R47.01 APHASIA: Status: ACTIVE | Noted: 2023-09-29

## 2023-09-29 PROBLEM — E88.09 HYPOALBUMINEMIA: Status: ACTIVE | Noted: 2023-09-29

## 2023-09-29 PROBLEM — R41.9 COGNITIVE COMPLAINTS WITH NORMAL NEUROPSYCHOLOGICAL EXAM: Status: ACTIVE | Noted: 2023-09-29

## 2023-09-29 PROBLEM — R13.10 DYSPHAGIA, IDIOPATHIC: Status: ACTIVE | Noted: 2023-09-29

## 2023-09-29 PROBLEM — M70.62 GREATER TROCHANTERIC BURSITIS, LEFT: Status: ACTIVE | Noted: 2023-09-29

## 2023-09-29 PROBLEM — G45.9 TIA (TRANSIENT ISCHEMIC ATTACK): Status: ACTIVE | Noted: 2023-09-29

## 2023-09-29 PROBLEM — L57.0 ACTINIC KERATOSIS: Status: ACTIVE | Noted: 2017-07-07

## 2023-09-29 PROBLEM — G47.33 OSA (OBSTRUCTIVE SLEEP APNEA): Status: ACTIVE | Noted: 2023-05-09

## 2023-09-29 PROBLEM — R26.89 POOR BALANCE: Status: ACTIVE | Noted: 2023-09-29

## 2023-09-29 PROBLEM — S61.459A DOG BITE, HAND: Status: ACTIVE | Noted: 2023-09-29

## 2023-09-29 PROBLEM — M17.11 PRIMARY OSTEOARTHRITIS OF RIGHT KNEE: Status: ACTIVE | Noted: 2023-09-29

## 2023-09-29 PROBLEM — R79.89 OTHER SPECIFIED ABNORMAL FINDINGS OF BLOOD CHEMISTRY: Status: ACTIVE | Noted: 2023-09-29

## 2023-09-29 PROBLEM — M54.12 CERVICAL RADICULITIS: Status: ACTIVE | Noted: 2023-09-29

## 2023-09-29 PROBLEM — M41.9 LUMBAR SCOLIOSIS: Status: ACTIVE | Noted: 2023-09-29

## 2023-09-29 PROBLEM — R94.31 ABNORMAL EKG: Status: ACTIVE | Noted: 2023-09-29

## 2023-09-29 PROBLEM — M70.22 OLECRANON BURSITIS OF LEFT ELBOW: Status: ACTIVE | Noted: 2023-09-29

## 2023-09-29 PROBLEM — Z87.19 HISTORY OF HIATAL HERNIA: Status: ACTIVE | Noted: 2023-09-29

## 2023-09-29 PROBLEM — J30.9 ALLERGIC RHINITIS: Status: ACTIVE | Noted: 2023-09-29

## 2023-09-29 PROBLEM — U09.9 COVID-19 LONG HAULER MANIFESTING CHRONIC CONCENTRATION DEFICIT: Status: ACTIVE | Noted: 2023-09-29

## 2023-09-29 PROBLEM — G56.21 CUBITAL TUNNEL SYNDROME, RIGHT: Status: ACTIVE | Noted: 2023-09-29

## 2023-09-29 PROBLEM — R73.01 IMPAIRED FASTING GLUCOSE: Status: ACTIVE | Noted: 2023-09-29

## 2023-09-29 PROBLEM — H91.90 HEARING DECREASED: Status: ACTIVE | Noted: 2023-09-29

## 2023-09-29 PROBLEM — S61.451A DOG BITE OF RIGHT HAND: Status: ACTIVE | Noted: 2023-09-29

## 2023-09-29 PROBLEM — M54.9 BACK PAIN WITH RADIATION: Status: ACTIVE | Noted: 2023-09-29

## 2023-09-29 PROBLEM — K22.70 BARRETT'S ESOPHAGUS: Status: ACTIVE | Noted: 2023-09-29

## 2023-09-29 PROBLEM — S43.006A DISLOCATION OF SHOULDER JOINT: Status: ACTIVE | Noted: 2023-09-29

## 2023-09-29 PROBLEM — R49.0 HOARSENESS OF VOICE: Status: ACTIVE | Noted: 2023-09-29

## 2023-09-29 PROBLEM — N40.1 BENIGN PROSTATIC HYPERPLASIA (BPH) WITH URINARY URGENCY: Status: ACTIVE | Noted: 2023-09-29

## 2023-09-29 PROBLEM — R73.9 HYPERGLYCEMIA: Status: ACTIVE | Noted: 2023-09-29

## 2023-09-29 PROBLEM — J38.00 VOCAL CORD WEAKNESS: Status: ACTIVE | Noted: 2023-09-29

## 2023-09-29 PROBLEM — S99.929A FOOT INJURY: Status: ACTIVE | Noted: 2023-09-29

## 2023-09-29 PROBLEM — R22.1 MASS OF NECK: Status: ACTIVE | Noted: 2023-09-29

## 2023-09-29 PROBLEM — M17.0 PRIMARY OSTEOARTHRITIS OF BOTH KNEES: Status: ACTIVE | Noted: 2023-09-29

## 2023-09-29 PROBLEM — Z87.19 HISTORY OF ESOPHAGEAL STRICTURE: Status: ACTIVE | Noted: 2023-09-29

## 2023-09-29 PROBLEM — L30.9 ECZEMA: Status: ACTIVE | Noted: 2023-09-29

## 2023-09-29 PROBLEM — H69.93 DYSFUNCTION OF BOTH EUSTACHIAN TUBES: Status: ACTIVE | Noted: 2023-09-29

## 2023-09-29 PROBLEM — M54.2 CERVICALGIA: Status: ACTIVE | Noted: 2023-09-29

## 2023-09-29 PROBLEM — M53.3 SACROILIAC JOINT DYSFUNCTION OF BOTH SIDES: Status: ACTIVE | Noted: 2023-09-29

## 2023-09-29 PROBLEM — M48.061 DEGENERATIVE LUMBAR SPINAL STENOSIS: Status: ACTIVE | Noted: 2023-09-29

## 2023-09-29 PROBLEM — J38.3: Status: ACTIVE | Noted: 2023-09-29

## 2023-09-29 PROBLEM — R25.1 TREMOR: Status: ACTIVE | Noted: 2023-05-09

## 2023-09-29 PROBLEM — Z98.890 STATUS POST LASIK SURGERY: Status: ACTIVE | Noted: 2018-01-06

## 2023-09-29 PROBLEM — S49.90XA SHOULDER INJURY, INITIAL ENCOUNTER: Status: ACTIVE | Noted: 2023-09-29

## 2023-09-29 PROBLEM — J45.909 ASTHMA (HHS-HCC): Status: ACTIVE | Noted: 2023-09-29

## 2023-09-29 PROBLEM — R40.0 DAYTIME SOMNOLENCE: Status: ACTIVE | Noted: 2023-09-29

## 2023-09-29 PROBLEM — H91.90 HOH (HARD OF HEARING): Status: ACTIVE | Noted: 2023-05-09

## 2023-09-29 PROBLEM — E78.5 HYPERLIPIDEMIA: Status: ACTIVE | Noted: 2023-09-29

## 2023-09-29 PROBLEM — N52.9 MALE ERECTILE DISORDER: Status: ACTIVE | Noted: 2023-09-29

## 2023-09-29 PROBLEM — M12.811 ROTATOR CUFF ARTHROPATHY OF RIGHT SHOULDER: Status: ACTIVE | Noted: 2023-09-29

## 2023-09-29 PROBLEM — R22.1 NODULE OF SKIN OF NECK: Status: ACTIVE | Noted: 2023-09-29

## 2023-09-29 PROBLEM — M48.061 LUMBAR CANAL STENOSIS: Status: ACTIVE | Noted: 2023-09-29
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
## 2023-09-29 PROBLEM — M62.511 ATROPHY OF MUSCLE OF RIGHT SHOULDER: Status: ACTIVE | Noted: 2023-09-29

## 2023-09-29 RX ORDER — MUPIROCIN 20 MG/G
OINTMENT TOPICAL 2 TIMES DAILY
COMMUNITY
End: 2023-10-25 | Stop reason: ALTCHOICE

## 2023-09-29 RX ORDER — OXYCODONE AND ACETAMINOPHEN 5; 325 MG/1; MG/1
1 TABLET ORAL DAILY PRN
COMMUNITY
End: 2023-10-25 | Stop reason: ALTCHOICE

## 2023-09-29 RX ORDER — ACETAMINOPHEN AND CODEINE PHOSPHATE 300; 30 MG/1; MG/1
1 TABLET ORAL EVERY 6 HOURS
COMMUNITY
End: 2023-10-25 | Stop reason: ALTCHOICE

## 2023-09-29 RX ORDER — VIBEGRON 75 MG/1
1 TABLET, FILM COATED ORAL DAILY
COMMUNITY
End: 2023-10-25 | Stop reason: ALTCHOICE

## 2023-09-29 RX ORDER — OXYCODONE HYDROCHLORIDE 5 MG/1
5 TABLET ORAL EVERY 4 HOURS PRN
COMMUNITY
End: 2023-10-25 | Stop reason: ALTCHOICE

## 2023-09-29 RX ORDER — AMITRIPTYLINE HYDROCHLORIDE 10 MG/1
1-2 TABLET, FILM COATED ORAL NIGHTLY
COMMUNITY
End: 2023-12-28 | Stop reason: ALTCHOICE

## 2023-09-29 RX ORDER — CHLORHEXIDINE GLUCONATE 40 MG/ML
SOLUTION TOPICAL
COMMUNITY
End: 2023-10-25 | Stop reason: ALTCHOICE

## 2023-09-29 RX ORDER — METHYLPREDNISOLONE 4 MG/1
TABLET ORAL
COMMUNITY
End: 2023-10-25 | Stop reason: ALTCHOICE

## 2023-09-29 RX ORDER — PREDNISOLONE ACETATE 10 MG/ML
1 SUSPENSION/ DROPS OPHTHALMIC 4 TIMES DAILY
COMMUNITY
End: 2023-10-25 | Stop reason: ALTCHOICE

## 2023-10-02 ENCOUNTER — APPOINTMENT (OUTPATIENT)
Dept: UROLOGY | Facility: CLINIC | Age: 78
End: 2023-10-02
Payer: MEDICARE

## 2023-10-02 NOTE — OP NOTE
Post Operative Note:     PreOp Diagnosis: lumbar foraminal stenosis   Post-Procedure Diagnosis: lumbar foraminal stenosis   Procedure: 1. Bilateral L4 transforaminal epidural  steroid injection using fluoroscopic guidance with depo   Surgeon: Dr. Arben Flores MD PHD   Resident/Fellow/Other Assistant: Dr. Gael Sanches DO   Anesthesia: local   I.V. Fluids: none   Estimated Blood Loss (mL): none   Specimen: no   Findings: n/a     Operative Report Dictated:  Dictation: not applicable - note contains Operative  Report   Operative Report:    Mr. Hank Rick is a 77-year-old male with a past medical history that is significant for lumbar spine stenosis with radicular symptoms.   He had previously received a two-level Vertiflex procedure (L3/4 and L4/5).  He has foraminal stenosis at L4 bilaterally.  MRI imaging revealed improved central canal at both levels.  He is here today for a repeat L4 bilateral transforaminal epidural  steroid injection with depo.     After informed consent was obtained, the patient was brought to the procedure suite and placed in the prone position.  Pulse oximetry and blood pressure were monitored throughout.  The low back area was prepped and draped in the usual sterile fashion.   Using fluoroscopic guidance, the skin and subcutaneous tissue overlying the needle trajectory were anesthetized with 0.5% lidocaine.  The 22-gauge Sprot  needle(s) were then advanced under fluoroscopic guidance into the foramina of L4 b/l.  Needle tip position(s) were confirmed in AP  and lateral views.  Injection of Omnipaque contrast revealed appropriate spread of the dye without vascular uptake.  Next, at each site, .5 ml of 2% lidocaine was injected to test for vascular uptake, which was negative.  Thereafter, 1 mL of 0.5% lidocaine and 20 mg of methylprednisolone were injected through each needle tip.  The needle(s) were removed and the patient was then transferred  to the recovery room in stable condition.   The patient tolerated the procedure well.  There were no apparent complications.     FOLLOW UP:  The patient will update us on their response to this procedure, and agrees to continue currently prescribed/recommended therapies        Attestation:   Note Completion:  I am a: Resident/Fellow   Attending Attestation I was present for the entire procedure          Electronic Signatures:  Gael Sanches (DO (Fellow))  (Signed 06-Jun-2023 08:09)   Authored: Post Operative Note, Note Completion  Arben Flores)  (Signed 06-Jun-2023 09:19)   Authored: Post Operative Note, Note Completion   Co-Signer: Post Operative Note, Note Completion      Last Updated: 06-Jun-2023 09:19 by Arben Flores)

## 2023-10-02 NOTE — OP NOTE
Post Operative Note:     Post-Procedure Diagnosis: Lumbar spinal stenosis,  severe   Procedure: 1.   2.   3.   4.   5.   Surgeon: Moy   Resident/Fellow/Other Assistant: Kodi Rabago   Estimated Blood Loss (mL): none   Specimen: no   Findings: None     Operative Report Dictated:  Dictation: not applicable - note contains Operative  Report   Operative Report:    Preoperative diagnosis/postoperative diagnosis: Lumbar spinal stenosis  Procedure: Bilateral L4 lumbar transforaminal epidural steroid injection under fluoroscopic guidance  with methylprednisolone  Surgeon: Cheerlle Winter  Assistant:  Fellow  Anesthesia: Local  Complications: Apparently none    Clinical note: Hank Rick is a 77-year-old male with a history of back and leg symptoms.  He has known spinal stenosis and has failed a number of conservative measures including physical therapy,  epidural injections, and interspinous spacer placement by my colleague Dr. Flores.  The patient did have a recent transforaminal at the aforementioned level with methylprednisolone which gave him very good relief but only lasted a week and notes that he  was on excursion when he had recurrence of pain.  Uneven walkways he thinks might have been aggravating his pain in addition to extensive walking.  He did see spine surgery who recommended further conservative measures and ongoing aggressive physical  therapy.  The patient is working on a core strengthening program.    Procedure note: The patient was met in the preoperative holding area after risks benefits and alternatives to procedure were discussed with the patient, informed consent was obtained. Patient brought back to the procedure room and placed in the prone  position on the fluoroscopy table. Area over the back was exposed, prepped, draped, in the usual sterile fashion.  Skin and subcutaneous tissues to the neuroforamen was anesthetized using 0.5% lidocaine.  22-gauge Sprotte needles were inserted in the   skin and advanced into the foramen. Needle tip position was confirmed in AP oblique and lateral view.  Contrast was injected which showed some discal uptake on the right.  The needle was repositioned  and bilaterally in the final position there was appropriate epidural spread, no intravascular or intrathecal uptake. A total of 2 mL of  2% lidocaine was injected in divided dose among the 2 needles.  Lower extremity motor strength assessed at 1 and 2 minutes which was  grossly intact bilaterally.  Thereafter 1 mL of 0.5% lidocaine mixed with 40 mg of methylprednisolone was injected in divided doses among the 2 needles. Needles removed, bandage applied, patient  tolerated the procedure well with no immediate complications.      Attestation:   Note Completion:  Attending Attestation I was present for the entire procedure         Electronic Signatures:  Cherelle Winter)  (Signed 23-Jun-2023 08:17)   Authored: Post Operative Note, Note Completion      Last Updated: 23-Jun-2023 08:17 by Cherelle Winter)

## 2023-10-02 NOTE — OP NOTE
Post Operative Note:     PreOp Diagnosis: Lumbar spinal stenosis with neurogenic  claudication   Post-Procedure Diagnosis: Lumbar spinal stenosis  with neurogenic claudication   Procedure: 1.  Placement of interspinous spacer (Vertiflex)  at L3/L4  2.  Placement of interspinous spacer (Vertiflex) at L4/L5  3.  Physician guided fluoroscopy   Surgeon: Arben Flores MD, PhD   Resident/Fellow/Other Assistant: Angie Carreon MD   Estimated Blood Loss (mL): none  50 mL   Specimen: no   Findings: See Operative Note     Operative Report Dictated:  Dictation: not applicable - note contains Operative  Report   Operative Report:      After risks and benefits were explained to the patient, an informed consent was obtained. A peripheral IV was started and antibiotics administered prior to the start of the procedure for prophylaxis against infection.  The patient was brought back to  the operating suite and placed in the prone position on the procedure table.  Monitored anesthesia care was induced.  The patient?s lower thoracic, lumbar and sacral regions were prepped and draped in a surgical sterile fashion using isopropyl  alcohol and chlorhexidine, followed by a full laparotomy drape with Ioban.    A fluoroscopic C-Arm was brought into AP orientation. The interspace between L3/4 and L4/5 was identified.  The skin and subcutaneous tissue were anesthetized with a solution containing 0.25% bupivacaine mixed with 1% lidocaine with epinephrine 1:400,000,  using a total of 8 cc for a longitudinal incision that is about 8 cm long.  A #10 blade was used to incise the skin and subcutaneous tissue.  Electrocautery was used to achieve hemostasis.  The patient had significant oozing from small blood vessels throughout  the case.  However, he was not on any anticoagulants.    A #15 scalpel was used to make a stab incision down to the supraspinous ligament. A Vertiflex dilator was placed into the skin and advanced using  intermittent fluoroscopic guidance in an alternating AP and lateral images down to the lamina between L3  and L4. A series of dilators were used to place the working cannula in proper position, dorsal to the lamina. A measuring gauge was introduced to the proper depth and the space was measured. The space measured and found to be 14 mm.  A 14 mm Superion  device (Vertiflex) was introduced and deployed at the correct depth and then advanced down to the lamina. AP and lateral images were taken to confirm proper placement of the device.        A similar procedure was conducted at L4/5.  Of note, the L4 spinous process was more medial than the L5 spinous process.  This necessitated adjustment in placing the spacer.  A 14 mm spacer was placed uneventfully in the end.  However, there was a slight  curvature to the left upper wing of the spacer during the placement though the placement occurred in an optimal position.      The wound was copiously irrigated with the vancomycin containing irrigation solution.  The procedural incisions were closed with 0 Vicryl sutures followed by 2-0 V Loc for the subcutaneous layer and then 4-0 V Loc for the subcuticular layer and antibiotic  ointment was then placed over the incision which was covered with an island dressing.     The patient was transferred to the recovery room in stable condition and was provided with appropriate post discharge instructions including oral antibiotics and pain medications.  The patient will follow up with us in 1 week.     Attestation:   Note Completion:  Attending Attestation I was present for the entire procedure         Electronic Signatures:  Arben Flores)  (Signed 13-Apr-2023 14:10)   Authored: Post Operative Note, Note Completion      Last Updated: 13-Apr-2023 14:10 by Arben Flores)

## 2023-10-02 NOTE — OP NOTE
Post Operative Note:     PreOp Diagnosis: lumbar spine stenosis with radicular  symptoms   Post-Procedure Diagnosis: lumbar spine stenosis with  radicular symptoms   Procedure: 1. BILATERAL L4 TRANSFORAMINAL EPIDURAL  STEROID INJECTION  2. Fluoroscopic guidance  3. Moderate sedation   Surgeon: Arben Flores MD PHD   Resident/Fellow/Other Assistant: Prakash Verduzco DO   Estimated Blood Loss (mL): none   Specimen: no   Findings: none     Operative Report Dictated:  Dictation: not applicable - note contains Operative  Report   Operative Report:    Mr. Hank Rick is a 77-year-old male with a past medical history that is significant for lumbar spine stenosis with radicular symptoms.  He had previously received  a two-level Vertiflex procedure (L3/4 and L4/5).  He has foraminal stenosis at L4 bilaterally.  MRI imaging revealed improved central canal at both levels.  He is here today for a repeat L4 bilateral transforaminal epidural steroid injection.    Patient was identified in preoperative holding. Appropriate consent was attained and the neck was appropriately marked with a marker. An IV was placed in the right hand by nursing. The patient was brought to the operating room and a time out procedure  was performed. The patient was placed into the prone position with appropriate padding at pressure points. The area was prepped and vigorously cleaned in the usual way with Chloraprep. Sterile towels were laid down to isolate the field. Fluoroscopy was  used to identify the L4-5 intervertebral space in the AP view. A right oblique view was used to identify the right intervertebral foramen of L4-5 . The skin and subcutaneous tissue was anesthestized with lidocaine 3 mL 1%. A 90 mm 22g Jonatan needle  was then inserted into the subcutaneous tissue and guided to the level of the foramen under the oblique view in a coaxial approach.  The appropriate depth was confirmed with lateral view. The same procedure was repeated for the  left side. 1 ml Radiopaque  contrast was injected into the space under live fluoroscopy which showed epidural spread along the path of the targeted spinal nerve in AP view. 0.5 mL (5 mg) of dexamethasone and 0.5 mL 1% lidocaine was injected into each level. The patient tolerated  the procedure well and was returned to the PACU for recovery.    Attestation:   Note Completion:  I am a: Resident/Fellow   Attending Attestation I was present for the entire procedure          Electronic Signatures:  Prakash Verduzco (DO (Resident))  (Signed 19-May-2023 08:53)   Authored: Post Operative Note, Note Completion  Arben Flores)  (Signed 19-May-2023 09:14)   Authored: Post Operative Note, Note Completion   Co-Signer: Post Operative Note, Note Completion      Last Updated: 19-May-2023 09:14 by Arben Flores)

## 2023-10-03 ENCOUNTER — TREATMENT (OUTPATIENT)
Dept: PHYSICAL THERAPY | Facility: CLINIC | Age: 78
End: 2023-10-03
Payer: MEDICARE

## 2023-10-03 DIAGNOSIS — M25.562 CHRONIC PAIN OF LEFT KNEE: ICD-10-CM

## 2023-10-03 DIAGNOSIS — M54.9 BACK PAIN WITH RADIATION: Primary | ICD-10-CM

## 2023-10-03 DIAGNOSIS — J30.9 CHRONIC ALLERGIC RHINITIS: ICD-10-CM

## 2023-10-03 DIAGNOSIS — G89.29 CHRONIC PAIN OF LEFT KNEE: ICD-10-CM

## 2023-10-03 DIAGNOSIS — M17.12 PRIMARY OSTEOARTHRITIS OF LEFT KNEE: ICD-10-CM

## 2023-10-03 PROCEDURE — 97113 AQUATIC THERAPY/EXERCISES: CPT | Mod: GP

## 2023-10-03 ASSESSMENT — PAIN - FUNCTIONAL ASSESSMENT: PAIN_FUNCTIONAL_ASSESSMENT: 0-10

## 2023-10-03 ASSESSMENT — PAIN SCALES - GENERAL: PAINLEVEL_OUTOF10: 4

## 2023-10-03 NOTE — PROGRESS NOTES
Physical Therapy  Physical Therapy Treatment    Patient Name: Hank Rick  MRN: [id number]  Today's Date: 10/3/2023  Time Calculation  Start Time: 0920  Stop Time: 1015  Time Calculation (min): 55 min      Assessment: Pt performed four and seven foot depth exercises of higher intensity without complaints of symptoms. Introduced seven foot depth exercises to perform non-weight bearing exercises. Treatment was focused on improving pt's gait, balance, gross lower extremity strength needed to improve to pre-surgical level of function. Will continue progressing as able.       Plan: Continue with aquatic exercises in various depths in order to improve pt to near PLOF to improve home safety and independence.       Current Problem  1. Back pain with radiation            General     Pain  Pain Assessment: 0-10  Pain Score: 4  Pain Radiating Towards: R leg    Subjective:   Subjective   Patient reports that he has been doing okay since last session. Denies any significant pain or soreness after first aquatic session.    Objective:   Objective   Observation: forward trunk flexion  Gait: slight festinating gait pattern    Treatments:    Exercises Minutes Comments   Forward walking  5 4 foot depth   Backward walking  5 4 foot depth   Lateral walking 5 4 foot depth   Mini squats 5 4 foot depth   Marching  5 4 foot depth   Bicycle 5 7 foot depth w/ 1 white noodle   Hip abd  5 7 foot depth w/ 1 white noodle   Hip ext 5 7 foot depth w/ 1 white noodle   Floating 5 7 foot depth w/ 1 white noodle   Scissors  5 7 foot depth w/ 1 white noodle   V-Stretch 5 7 foot depth w/ 1 white noodle       Bruno Walters, PT

## 2023-10-04 RX ORDER — AZELASTINE 1 MG/ML
2 SPRAY, METERED NASAL 2 TIMES DAILY
Qty: 30 ML | Refills: 5 | Status: SHIPPED | OUTPATIENT
Start: 2023-10-04

## 2023-10-04 RX ORDER — MELOXICAM 15 MG/1
TABLET ORAL
Qty: 30 TABLET | Refills: 1 | Status: SHIPPED | OUTPATIENT
Start: 2023-10-04 | End: 2023-12-10

## 2023-10-10 ENCOUNTER — HOSPITAL ENCOUNTER (OUTPATIENT)
Dept: RADIOLOGY | Facility: HOSPITAL | Age: 78
Discharge: HOME | End: 2023-10-10
Payer: MEDICARE

## 2023-10-10 ENCOUNTER — HOSPITAL ENCOUNTER (OUTPATIENT)
Dept: RADIOLOGY | Facility: HOSPITAL | Age: 78
End: 2023-10-10
Payer: MEDICARE

## 2023-10-10 ENCOUNTER — APPOINTMENT (OUTPATIENT)
Dept: SPEECH THERAPY | Facility: CLINIC | Age: 78
End: 2023-10-10
Payer: MEDICARE

## 2023-10-10 ENCOUNTER — TELEMEDICINE CLINICAL SUPPORT (OUTPATIENT)
Dept: SPEECH THERAPY | Facility: CLINIC | Age: 78
End: 2023-10-10
Payer: MEDICARE

## 2023-10-10 DIAGNOSIS — J38.00 VOCAL CORD WEAKNESS: ICD-10-CM

## 2023-10-10 DIAGNOSIS — J38.3 BOWING OF TRUE VOCAL CORD: ICD-10-CM

## 2023-10-10 DIAGNOSIS — R49.0 HOARSENESS OF VOICE: Primary | ICD-10-CM

## 2023-10-10 PROCEDURE — 92507 TX SP LANG VOICE COMM INDIV: CPT | Mod: GN,95 | Performed by: SPEECH-LANGUAGE PATHOLOGIST

## 2023-10-10 RX ORDER — FENTANYL CITRATE 50 UG/ML
25 INJECTION, SOLUTION INTRAMUSCULAR; INTRAVENOUS ONCE
Status: DISCONTINUED | OUTPATIENT
Start: 2023-10-10 | End: 2023-10-20 | Stop reason: HOSPADM

## 2023-10-10 RX ORDER — MIDAZOLAM HYDROCHLORIDE 1 MG/ML
2 INJECTION INTRAMUSCULAR; INTRAVENOUS ONCE
Status: DISCONTINUED | OUTPATIENT
Start: 2023-10-10 | End: 2023-10-20 | Stop reason: HOSPADM

## 2023-10-10 RX ORDER — DEXAMETHASONE SODIUM PHOSPHATE 10 MG/ML
10 INJECTION INTRAMUSCULAR; INTRAVENOUS ONCE
Status: DISCONTINUED | OUTPATIENT
Start: 2023-10-10 | End: 2023-10-20 | Stop reason: HOSPADM

## 2023-10-10 RX ORDER — LIDOCAINE HYDROCHLORIDE 5 MG/ML
10 INJECTION, SOLUTION INFILTRATION; PERINEURAL ONCE
Status: DISCONTINUED | OUTPATIENT
Start: 2023-10-10 | End: 2023-10-20 | Stop reason: HOSPADM

## 2023-10-10 ASSESSMENT — PAIN SCALES - GENERAL: PAINLEVEL_OUTOF10: 4

## 2023-10-10 ASSESSMENT — PAIN - FUNCTIONAL ASSESSMENT: PAIN_FUNCTIONAL_ASSESSMENT: 0-10

## 2023-10-10 NOTE — PROGRESS NOTES
Speech-Language Pathology    Outpatient Speech-Language Pathology Treatment     Patient Name: Hank Rick  MRN: [id number]  Today's Date: 10/10/2023     Time Calculation  Start Time: 1345  Stop Time: 1420  Time Calculation (min): 35 min      Current Problem:   Patient Active Problem List   Diagnosis    Chronic pain of left knee    Primary osteoarthritis of left knee    H/O dizziness    Vertebrobasilar artery insufficiency    Swelling of both ankles    Vertebral artery stenosis/occlusion, left    Urinary frequency    Primary hypertension    Hyponatremia    Abnormal EKG    Acne    Allergic rhinitis    Anemia, unspecified    Asthma    Atrophy of muscle of right shoulder    Back pain with radiation    Rodriguez's esophagus    Benign non-nodular prostatic hyperplasia with lower urinary tract symptoms    Benign prostatic hyperplasia (BPH) with urinary urgency    Greater trochanteric bursitis, left    Cervical spondylolysis    Cervical radiculitis    Chronic insomnia    Cognitive changes    Cognitive complaints with normal neuropsychological exam    Cubital tunnel syndrome, right    Daytime somnolence    Degenerative lumbar spinal stenosis    Lumbar radiculopathy    Lumbar stenosis with neurogenic claudication    Dysfunction of both eustachian tubes    Dysphagia    Eczema    Elevated serum creatinine    Erythrasma    Essential tremor    Tremor    Facet arthropathy, cervical    Myofascial pain syndrome, cervical    Nodule of skin of neck    Foot injury    Gastritis    GERD (gastroesophageal reflux disease)    Hearing decreased    Hoarseness of voice    Hyperglycemia    Hyperlipidemia    Hypoalbuminemia    GPC (giant papillary conjunctivitis)    Impaired fasting glucose    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    Laceration of hand    Lumbar scoliosis    Male erectile disorder    Memory loss    Cerebrovascular accident (CVA) (CMS/HCC)    Mixed Alzheimer's and vascular dementia (CMS/HCC)    TIA  (transient ischemic attack)    Mass of neck    Nocturia    Olecranon bursitis of left elbow    Other specified abnormal findings of blood chemistry    Poor balance    Primary osteoarthritis of both knees    Prostatitis    Rotator cuff arthropathy of right shoulder    Sacroiliac joint dysfunction of both sides    Dislocation of shoulder joint    Serrated polyp of colon    Shoulder injury, initial encounter    Shoulder pain, right    Status post LASIK surgery    Syncope    Thoracic back pain    Unsteady gait    Vocal cord weakness    Esophageal stricture    Bowing of true vocal cord    Actinic keratosis    Aphasia    [place] (hard of hearing)    Incipient cataract    WALKER (obstructive sleep apnea)    Bleeding from wound    BMI 25.0-25.9,adult    Cervicalgia    Dog bite of finger    Dog bite of right hand    Dog bite, hand    Dysphagia, idiopathic    Elevated ferritin    History of esophageal stricture    Fall, accidental    Finger abrasion    History of hiatal hernia    Increased urinary frequency    Insomnia    Lumbar canal stenosis    Lumbar strain    Primary osteoarthritis of right knee    Seasonal allergies    Skin rash    Sleep apnea         SLP Assessment:     Patient presents for voice therapy s/p thyroplasty.     Recently presented to clinic with c/o reduced vocal quality with findings of poor respiratory drive and control impacting his vibratory potential. Recommendations for RMST with vocal strengthening.     Patient here with RMST - the breather. Completed introductory trial this date. Patient able to progress to 2:2 at 5 sets of 5 breaths. I anticipate he will be able to increase his inspiratory pressure to 3 within the next few days. Patient able to perform with min-mod buccal pocketing which resolved given clinician verbal/visual cue.     Completed introductory PhoRTE with use of sustained [a]. Initial trials met with increased laryngeals strain, compression at 8-10 s MPT. Able to improve quality, comfort and  duration of voicing with cues for timing of inspiration prior to beginning voicing. Patient to complete x8 [a] after his RMST. All questions answered.     Overall, patient continues to benefit from skilled ST intervention in the area of voice to promote healthy voicing at home and in the community environment.      Plan:  Inpatient/Swing Bed or Outpatient: Outpatient  Treatment/Interventions: Voice  SLP TX Plan: Continue Plan of Care  SLP Plan: Skilled SLP  SLP Frequency: Every other week  Duration: 12 weeks  SLP Discharge Recommendations: Home independent  Discussed POC: Patient  Discussed Risks/Benefits: Yes, Patient  Patient/Caregiver Agreeable: Yes      Subjective   Current Problem:  Hoarseness s/p thyroplasty     Most Recent Visit:  SLP Received On: 10/10/23    General Visit Information:   Reason for Referral: hoarseness  Referred By: Dr. Dee  Past Medical History Relevant to Rehab: s/p bilateral thyroplasty, HL  Arrival: Independent      Pain Assessment:   Pain Assessment: 0-10  Pain Score: 4  Pain Type: Chronic pain  Pain Location: Back      Objective     Patient will increase vocal wellness and decrease phonotrauma in adherence with clinician prescribed vocal hygiene and wellness program per patient report.   Patient will increase ability to produce voice without tension within 5 minute conversational task x80% accuracy as judged by clinician observation and/or patient report.   Patient will demonstrate independent use of voice/breathing techniques x80% accuracy.  Patient will increase the strength of laryngeal/respiratory musculature.     Projected STG completion date: x4  ST sessions conducted x1/eowk    Voice:  Voice Interventions: Respiratory Retraining

## 2023-10-13 ENCOUNTER — HOSPITAL ENCOUNTER (OUTPATIENT)
Dept: RADIOLOGY | Facility: HOSPITAL | Age: 78
Discharge: HOME | End: 2023-10-13
Payer: MEDICARE

## 2023-10-13 VITALS
HEIGHT: 73 IN | HEART RATE: 1 BPM | SYSTOLIC BLOOD PRESSURE: 147 MMHG | OXYGEN SATURATION: 98 % | BODY MASS INDEX: 24.52 KG/M2 | WEIGHT: 185 LBS | RESPIRATION RATE: 17 BRPM | DIASTOLIC BLOOD PRESSURE: 78 MMHG | TEMPERATURE: 98.2 F

## 2023-10-13 DIAGNOSIS — M48.062 SPINAL STENOSIS OF LUMBAR REGION WITH NEUROGENIC CLAUDICATION: Primary | ICD-10-CM

## 2023-10-13 PROCEDURE — 77003 FLUOROGUIDE FOR SPINE INJECT: CPT

## 2023-10-13 PROCEDURE — 64483 NJX AA&/STRD TFRM EPI L/S 1: CPT | Performed by: ANESTHESIOLOGY

## 2023-10-13 PROCEDURE — 64483 NJX AA&/STRD TFRM EPI L/S 1: CPT | Mod: 50 | Performed by: ANESTHESIOLOGY

## 2023-10-13 RX ORDER — OXYCODONE HYDROCHLORIDE 5 MG/1
5 TABLET ORAL DAILY
Qty: 5 TABLET | Refills: 0 | OUTPATIENT
Start: 2023-10-13 | End: 2023-10-18

## 2023-10-13 RX ORDER — DEXAMETHASONE SODIUM PHOSPHATE 10 MG/ML
10 INJECTION INTRAMUSCULAR; INTRAVENOUS ONCE
Status: DISCONTINUED | OUTPATIENT
Start: 2023-10-13 | End: 2023-10-20 | Stop reason: HOSPADM

## 2023-10-13 RX ORDER — LIDOCAINE HYDROCHLORIDE 5 MG/ML
10 INJECTION, SOLUTION INFILTRATION; PERINEURAL ONCE
Status: DISCONTINUED | OUTPATIENT
Start: 2023-10-13 | End: 2023-10-20 | Stop reason: HOSPADM

## 2023-10-13 RX ORDER — FENTANYL CITRATE 50 UG/ML
25 INJECTION, SOLUTION INTRAMUSCULAR; INTRAVENOUS ONCE
Status: DISCONTINUED | OUTPATIENT
Start: 2023-10-13 | End: 2023-10-20 | Stop reason: HOSPADM

## 2023-10-13 RX ORDER — MIDAZOLAM HYDROCHLORIDE 1 MG/ML
2 INJECTION INTRAMUSCULAR; INTRAVENOUS ONCE
Status: DISCONTINUED | OUTPATIENT
Start: 2023-10-13 | End: 2023-10-20 | Stop reason: HOSPADM

## 2023-10-13 ASSESSMENT — PAIN SCALES - GENERAL
PAINLEVEL_OUTOF10: 3
PAINLEVEL_OUTOF10: 4
PAINLEVEL_OUTOF10: 5 - MODERATE PAIN
PAINLEVEL_OUTOF10: 2

## 2023-10-13 ASSESSMENT — PAIN - FUNCTIONAL ASSESSMENT: PAIN_FUNCTIONAL_ASSESSMENT: 0-10

## 2023-10-13 NOTE — H&P
History Of Present Illness  Hank Rick is a 78 y.o. male presents for procedure state below. Endorses no changes in past medical history or medical health since last seen in clinic.  Plavix held appropriately.     Past Medical History  He has a past medical history of COVID-19 (11/15/2022), Esophageal obstruction (05/15/2022), Essential tremor (12/29/2022), Olecranon bursitis, left elbow (04/26/2016), Personal history of diseases of the blood and blood-forming organs and certain disorders involving the immune mechanism (05/17/2022), Personal history of other diseases of the digestive system (11/13/2014), Personal history of other diseases of the digestive system (01/14/2021), Personal history of other specified conditions (11/29/2018), Rash and other nonspecific skin eruption (11/02/2021), and Salmonella enteritis.    Surgical History  He has a past surgical history that includes Other surgical history (01/07/2015); Tonsillectomy (11/18/2014); Refractive surgery (03/05/2014); Other surgical history (03/05/2014); MR angio head wo IV contrast (5/9/2023); MR angio neck wo IV contrast (5/9/2023); CT angio head w and wo IV contrast (5/12/2023); and CT angio neck w and wo IV contrast (5/12/2023).     Social History  He reports that he has never smoked. He has never used smokeless tobacco. He reports current alcohol use of about 4.0 standard drinks of alcohol per week. No history on file for drug use.    Family History  Family History   Problem Relation Name Age of Onset    Multiple sclerosis Mother      Colon cancer Father          Allergies  Pollen extracts and Gabapentin    Review of Symptoms:   Constitutional: Negative for chills, diaphoresis or fever  HENT: Negative for neck swelling  Eyes:.  Negative for eye pain  Respiratory:.  Negative for cough, shortness of breath or wheezing    Cardiovascular:.  Negative for chest pain or palpitations  Gastrointestinal:.  Negative for abdominal pain, nausea and  vomiting  Genitourinary:.  Negative for urgency  Musculoskeletal:  Positive for back pain. Positive for joint pain. Denies falls within the past 3 months.  Skin: Negative for wounds or itching   Neurological: Negative for dizziness, seizures, loss of consciousness and weakness  Endo/Heme/Allergies: Does not bruise/bleed easily  Psychiatric/Behavioral: Negative for depression. The patient does not appear anxious.       PHYSICAL EXAM  Vitals signs reviewed  Constitutional:       General: Not in acute distress     Appearance: Normal appearance. Not ill-appearing.  HENT:     Head: Normocephalic and atraumatic  Eyes:     Conjunctiva/sclera: Conjunctivae normal  Cardiovascular:     Rate and Rhythm: Normal rate and regular rhythm  Pulmonary:     Effort: No respiratory distress  Abdominal:     Palpations: Abdomen is soft  Musculoskeletal: BRYAN  Skin:     General: Skin is warm and dry  Neurological:     General: No focal deficit present  Psychiatric:         Mood and Affect: Mood normal         Behavior: Behavior normal     Last Recorded Vitals  There were no vitals taken for this visit.    Relevant Results  Current Outpatient Medications   Medication Instructions    acetaminophen-codeine (Tylenol w/ Codeine #3) 300-30 mg tablet 1 tablet, oral, Every 6 hours    albuterol 90 mcg/actuation inhaler 2 puffs, inhalation, Every 4 to 6 hours as needed as directed    amitriptyline (Elavil) 10 mg tablet 1-2 tablets, oral, Nightly    amLODIPine (NORVASC) 2.5 mg, oral, Daily    atorvastatin (Lipitor) 40 mg tablet TAKE 1 TABLET BY MOUTH ONCE DAILY    atorvastatin (LIPITOR) 40 mg, oral, Daily    azelastine (Astelin) 137 mcg (0.1 %) nasal spray 2 sprays, Each Nostril, 2 times daily    benzonatate (Tessalon) 100 mg capsule TAKE 1 CAPSULE BY MOUTH THREE TIMES A DAY    benzonatate (TESSALON) 100 mg, oral, 3 times daily PRN, Do not crush or chew.    chlorhexidine (Hibiclens) 4 % external liquid Topical, Use as directed for preoperative shower     clopidogrel (Plavix) 75 mg tablet TAKE 1 TABLET BY MOUTH ONCE DAILY END DATE (09/11/23)    clopidogrel (PLAVIX) 75 mg, oral, Daily    diclofenac sodium (Voltaren) 1 % gel gel Topical, 2 times daily PRN    docusate sodium (COLACE) 100 mg, oral, 2 times daily    fexofenadine (Allegra) 180 mg tablet 1 tablet, oral, Daily    fluocinonide (Lidex) 0.05 % ointment Topical, As needed    fluticasone (Flonase) 50 mcg/actuation nasal spray 1-2 sprays, Each Nostril, Daily    lidocaine (LMX) 4 % cream 1 Application, Topical, Daily    lidocaine 4 % patch APPLY 1 PATCH TOPICALLY TO AFFECTED AREA ONCE DAILY. LEAVE IN PLACE FOR 12 HOURS, THEN REMOVE AND KEEP OFF FOR 12 HOURS.    meloxicam (Mobic) 15 mg tablet take 1 tablet by mouth once daily if needed for MODERATE PAIN ( PAIN SCALE 4-6 )    memantine (Namenda) 5 mg tablet 1 po at bedtime x 4 weeks, then 1 po bid    methylPREDNISolone (Medrol) 4 mg tablet oral, Take as directed 21 therapy pack    montelukast (Singulair) 10 mg tablet 1 tablet, oral, Daily    mupirocin (Bactroban) 2 % ointment Topical, 2 times daily, 3 days before surgery    omeprazole (PriLOSEC) 20 mg DR capsule Take 1 capsule by mouth bid for 2 weeks then 1 capsule daily    omeprazole OTC (PriLOSEC OTC) 20 mg EC tablet 1 tablet, oral, Daily    oxyCODONE (ROXICODONE) 5 mg, oral, Every 4 hours PRN    oxyCODONE-acetaminophen (Percocet) 5-325 mg tablet 1 tablet, oral, Daily PRN    prednisoLONE acetate (Pred-Forte) 1 % ophthalmic suspension 1 drop, Right Eye, 4 times daily, Refer to prescription notes    propranolol (Inderal) 10 mg tablet 1 tablet, oral, 3 times daily    propranolol LA (INDERAL LA) 120 mg, oral, Daily before breakfast    propranolol LA (INDERAL LA) 60 mg, oral, Daily    sennosides (Senokot) 8.6 mg tablet 1 tablet, oral, Daily    sennosides (Senokot) 8.6 mg tablet TAKE 1 TABLET BY MOUTH TWO TIMES A DAY AS NEEDED FOR CONSTIPATION    sildenafil (Viagra) 100 mg tablet 0.5-1 tablets, oral, As needed     tiZANidine (ZANAFLEX) 4 mg, oral, Every 6 hours PRN    topiramate (Topamax) 25 mg tablet 1-2 tablets, oral, Nightly    traZODone (DESYREL) 50 mg, oral, As needed    vibegron (Gemtesa) 75 mg tablet 1 tablet, oral, Daily         MR lumbar spine wo IV contrast 05/10/2023    Narrative  Interpreted By:  MALINDA QUINN MD  MRN: [id number]  Patient Name: IRENA WHITTAKERN    STUDY:  MRI of the lumbar spine  without contrast.    INDICATION:  Lower back pain with radicular sx    COMPARISON:  MRI lumbar spine 02/06/2023    ACCESSION NUMBER(S):  [id number]    ORDERING CLINICIAN:  BRIAN MERLOS    TECHNIQUE:  Multiplanar, multisequence imaging of the lumbar spine was performed.    FINDINGS:  Alignment: Minimal retrolisthesis of L3-4 and L4-5, unchanged.    Bones: Moderate spondylosis with unchanged heterogeneous marrow  signal.    Cord: Unremarkable.    Disc spaces: Multilevel loss of disc height and disc desiccation.    T12-L1:  No spinal canal or foraminal narrowing.    L1-L2:  No spinal canal or foraminal narrowing.    L2-L3:  Disc bulge, bilateral facet osteophytes, no spinal canal or  foraminal narrowing.    L3-L4:  Posterior subluxation disc bulge, bilateral facet  osteophytes, no spinal canal narrowing, no right and minimal left  foraminal narrowing.    L4-L5:  Posterior subluxation, disc bulge, bilateral facet  osteophytes, moderate spinal canal narrowing, severe right and  minimal left foraminal narrowing, unchanged.    L5-S1:  Disc bulge, bilateral facet osteophytes, no spinal canal  narrowing, minimal bilateral foraminal narrowing, unchanged.    Other: Unremarkable.    Impression  1.  Degenerative changes, worst at L3-4 and L4-5 with moderate spinal  canal narrowing.      MR LUMBAR SPINE WO IV CONTRAST 02/06/2023    Narrative  MRN: [id number]  Patient Name: JUN WHITTAKER    STUDY:  MRI L-SPINE WO;  2/6/2023 8:17 pm    INDICATION:  LUMBAR  M54.16: Lumbar radiculopathy M48.062: Lumbar stenosis with  neurogenic claudication  M54.9: Back pain.    COMPARISON:  March 2022.    ACCESSION NUMBER(S):  [id number]    ORDERING CLINICIAN:  MICHAEL PEREZ    TECHNIQUE:  The lumbar spine was studied in the sagital, axial and coronal planes  utiliing T1 and T2 weighted images.    FINDINGS:  The marrow signal is heterogeneous in a nonspecific pattern  consistent with bony hemangiomata, fatty rests and discogenic marrow  signal changes unchanged from the previous exam. Narrowing of  intervertebral disc spaces throughout the lumbar spine unchanged from  the previous exam. Vertebral body height are normal. The conus and  sacrum are normal.  Images at each interspace reveal the following:  L1/L2  Bilateral facet hypertrophy without canal or foraminal narrowing  L2/L3  Bilateral facet hypertrophy without canal or foraminal narrowing  L3/L4  Asymmetrical bulging disc or osteophyte formation toward the left.  Bilateral facet hypertrophy. Flattening of the thecal sac which  measures a proximally 7 mm in AP dimension in the midline. Left worse  than right foraminal narrowing without focal disc herniation  L4/L5  Asymmetrical bulging disc or osteophyte formation toward the right.  Bilateral facet hypertrophy. Focal right-sided foraminal narrowing.  Flattening of the thecal sac which measures approximately 6 mm in AP  dimension in the midline.  L5/S1  Bilateral facet hypertrophy without canal or foraminal narrowing    Impression  * Lumbar spondylosis as described is unchanged compared to the  previous exam    The examination was interpreted [facility]      MR LUMBAR SPINE WO IV CONTRAST 03/26/2022    Narrative  MRN: [id number]  Patient Name: JUN WHITTAKER    STUDY:  MRI L-SPINE WO;  3/26/2022 9:20 am    INDICATION:  Patient with ongoing low back pain and right radicular symptoms  despite conservative management including medications, injections,  physical therapy.  Patient has been doing daily home exercises that  he learned in physical therapy an...   M54.16: Lumbar radiculopathy.    COMPARISON:  None.    ACCESSION NUMBER(S):  [id number]    ORDERING CLINICIAN:  HANK GOMEZ    TECHNIQUE:  Multiplanar multisequence MR imaging of the lumbar spine performed  without intravenous contrast. Sagittal T1, T2, STIR, axial T1 and T2  weighted images of the lumbar spine were acquired.    FINDINGS:  Segmentation: Normal.    Conus: The lower thoracic cord appears unremarkable. The conus  terminates at the L1-L2 interspace level. Cauda equina are  unremarkable.  Epidural fluid: None.    Alignment: Mild rotatory dextroscoliotic curvature of the lumbar  spine. Trace retrolisthesis of L5 on S1.  Vertebral bodies: Normal.  Marrow signal: Multilevel degenerative endplate signal change  including type 1 Modic components most pronounced at L4-L5. No  additional suspicious STIR hyperintensity. Marrow heterogeneity may  reflect a component of demineralization.  Intervertebral discs: Asymmetric disc height loss along the rightward  aspect of L4-L5 with moderate degenerative disc height loss at L4  through S1.    Degenerative change:    T12-L1: Unremarkable.  L1-2: Anterior endplate spurring and disc bulge. No spinal canal  stenosis or neural foraminal narrowing.  L2-3: Minimal facet arthropathy and disc bulge. No spinal canal  stenosis or neural foraminal narrowing.  L3-4: Mild facet arthropathy and slight ligamentum flavum  hypertrophy. Mildly prominent dorsal epidural fat. There is mild to  moderate disc bulge, worse along the leftward aspect, with endplate  spurring. There is lateral recess narrowing with mild spinal canal  stenosis. Mild-to-moderate left and mild right neural foraminal  narrowing.  L4-5: Moderate right and mild left facet arthropathy with mild  ligamentum flavum hypertrophy. Slightly prominent dorsal epidural  fat. There is disc bulge and endplate spurring, eccentric along the  rightward aspect. There is right subarticular stenosis with  compression of the right L5  nerve root. Additional narrowing of the  left lateral recess with moderate spinal canal stenosis. Moderate to  severe right and mild left neural foraminal narrowing.  L5-S1: Mild right greater than left facet arthropathy. Mild disc  bulge and endplate spurring, worse along the rightward aspect. Disc  osteophyte components encroach on the S1 nerve roots with no  additional spinal canal stenosis. Mild right greater than left neural  foraminal narrowing secondary to spondylitic ridging.    Soft tissues: Mild asymmetric fatty atrophy of the right posterior  paraspinal musculature.    Impression  At L4-L5 there is right subarticular stenosis with compression of the  right L5 nerve root secondary to subarticular disc osteophyte  components. There is moderate spinal canal stenosis as well as a  component of left lateral recess narrowing also present. Moderate to  severe right L4 neural foraminal narrowing.     No image results found.       No diagnosis found.     ASSESSMENT/PLAN  Hank Rick is a 78 y.o. male presents for bilateral L4 transforaminal epidural steroid injection    Our plan is as follows:  - Follow In pain clinic  - Continue to participate in physical therapy as well as physician directed home exercises  - Continue pain medications as prescribed       Sumanth Bridges MD

## 2023-10-13 NOTE — PROCEDURES
Mr. iRck is a 78-year-old gentleman with history of lumbar spinal stenosis and neurogenic claudication with evidence of moderate stenosis at L3/4/5 status post interspinous spacer placement at both levels presents today for bilateral L4 transforaminal epidural steroid injections using methylprednisolone.  He has had similar past symptoms lasting.  He will be traveling soon and is requesting a short course of oxycodone.    The patient was identified in the preoperative area and informed consent was obtained.  The patient was then brought to the procedure room and placed in the prone position.  Using fluoroscopic guidance, the skin and subcutaneous tissue overlying needle trajectories to the L4/5 foramina were anesthetized with a total of 2 cc of Lidocaine.  22-gauge pencil point needles were then advanced under fluoroscopic guidance into the foramina where the L4 nerve roots exit bilaterally.  Needle positions were confirmed in AP and lateral views.  Injection of iohexol contrast under live fluoroscopy revealed appropriate epidural spread without vascular uptake.  Thereafter, 1 cc 2% lidocaine was injected at each needle.  Lower extremity strength was tested afterwards and was grossly intact bilaterally.  As such 20 mg methylprednisolone 0.5 cc of 0.5% lidocaine was applied medication management.  The patient was then transferred to the recovery room in stable condition and will update us on outpatient basis on the response to the procedure.  He will be prescribed a short course of oxycodone to take as needed during his travels.

## 2023-10-18 ENCOUNTER — APPOINTMENT (OUTPATIENT)
Dept: PREADMISSION TESTING | Facility: HOSPITAL | Age: 78
End: 2023-10-18
Payer: MEDICARE

## 2023-10-24 ENCOUNTER — TELEMEDICINE CLINICAL SUPPORT (OUTPATIENT)
Dept: SPEECH THERAPY | Facility: CLINIC | Age: 78
End: 2023-10-24
Payer: MEDICARE

## 2023-10-24 DIAGNOSIS — J38.00 VOCAL CORD WEAKNESS: Primary | ICD-10-CM

## 2023-10-24 DIAGNOSIS — J38.3 BOWING OF TRUE VOCAL CORD: ICD-10-CM

## 2023-10-24 DIAGNOSIS — R49.0 HOARSENESS OF VOICE: ICD-10-CM

## 2023-10-24 PROCEDURE — 92507 TX SP LANG VOICE COMM INDIV: CPT | Mod: GN,95 | Performed by: SPEECH-LANGUAGE PATHOLOGIST

## 2023-10-25 ENCOUNTER — CLINICAL SUPPORT (OUTPATIENT)
Dept: PREADMISSION TESTING | Facility: HOSPITAL | Age: 78
End: 2023-10-25
Payer: MEDICARE

## 2023-10-25 ENCOUNTER — TREATMENT (OUTPATIENT)
Dept: PHYSICAL THERAPY | Facility: CLINIC | Age: 78
End: 2023-10-25
Payer: MEDICARE

## 2023-10-25 VITALS
DIASTOLIC BLOOD PRESSURE: 83 MMHG | HEART RATE: 61 BPM | TEMPERATURE: 97.3 F | SYSTOLIC BLOOD PRESSURE: 141 MMHG | BODY MASS INDEX: 25.42 KG/M2 | OXYGEN SATURATION: 95 % | RESPIRATION RATE: 16 BRPM | WEIGHT: 191.8 LBS | HEIGHT: 73 IN

## 2023-10-25 DIAGNOSIS — M54.9 BACK PAIN WITH RADIATION: Primary | ICD-10-CM

## 2023-10-25 PROCEDURE — 99203 OFFICE O/P NEW LOW 30 MIN: CPT | Performed by: NURSE PRACTITIONER

## 2023-10-25 PROCEDURE — 97113 AQUATIC THERAPY/EXERCISES: CPT | Mod: GP

## 2023-10-25 ASSESSMENT — PAIN - FUNCTIONAL ASSESSMENT: PAIN_FUNCTIONAL_ASSESSMENT: 0-10

## 2023-10-25 ASSESSMENT — CHADS2 SCORE
CHADS2 SCORE: 4
DIABETES: NO
HYPERTENSION: YES
CHF: NO
AGE GREATER THAN OR EQUAL TO 75: YES
PRIOR STROKE OR TIA OR THROMBOEMBOLISM: YES

## 2023-10-25 ASSESSMENT — DUKE ACTIVITY SCORE INDEX (DASI)
CAN YOU HAVE SEXUAL RELATIONS: NO
CAN YOU PARTICIPATE IN MODERATE RECREATIONAL ACTIVITIES LIKE GOLF, BOWLING, DANCING, DOUBLES TENNIS OR THROWING A BASEBALL OR FOOTBALL: NO
CAN YOU RUN A SHORT DISTANCE: NO
CAN YOU DO LIGHT WORK AROUND THE HOUSE LIKE DUSTING OR WASHING DISHES: NO
TOTAL_SCORE: 12.75
CAN YOU WALK A BLOCK OR TWO ON LEVEL GROUND: YES
CAN YOU DO YARD WORK LIKE RAKING LEAVES, WEEDING OR PUSHING A MOWER: NO
CAN YOU WALK INDOORS, SUCH AS AROUND YOUR HOUSE: YES
CAN YOU PARTICIPATE IN STRENOUS SPORTS LIKE SWIMMING, SINGLES TENNIS, FOOTBALL, BASKETBALL, OR SKIING: NO
CAN YOU CLIMB A FLIGHT OF STAIRS OR WALK UP A HILL: YES
CAN YOU DO HEAVY WORK AROUND THE HOUSE LIKE SCRUBBING FLOORS OR LIFTING AND MOVING HEAVY FURNITURE: NO
DASI METS SCORE: 4.3
CAN YOU TAKE CARE OF YOURSELF (EAT, DRESS, BATHE, OR USE TOILET): YES
CAN YOU DO MODERATE WORK AROUND THE HOUSE LIKE VACUUMING, SWEEPING FLOORS OR CARRYING GROCERIES: NO

## 2023-10-25 ASSESSMENT — ENCOUNTER SYMPTOMS
EYES NEGATIVE: 1
CARDIOVASCULAR NEGATIVE: 1
PSYCHIATRIC NEGATIVE: 1
NEUROLOGICAL NEGATIVE: 1
MUSCULOSKELETAL NEGATIVE: 1
CONSTITUTIONAL NEGATIVE: 1
RESPIRATORY NEGATIVE: 1
GASTROINTESTINAL NEGATIVE: 1

## 2023-10-25 ASSESSMENT — PAIN DESCRIPTION - DESCRIPTORS: DESCRIPTORS: ACHING

## 2023-10-25 ASSESSMENT — PAIN SCALES - GENERAL: PAINLEVEL_OUTOF10: 4

## 2023-10-25 NOTE — PREPROCEDURE INSTRUCTIONS
PAT DISCHARGE INSTRUCTIONS    Please call the Same Day Surgery (SDS) Department of the hospital where your procedure will be performed after 2:00 PM the day before your surgery. If you are scheduled on a Monday, or a Tuesday following a Monday holiday, you will need to call on the last business day prior to your surgery.    [facility]  [id number] [place] [address], [address]  [phone]    [address] [address] [phone]    [facility]  [address]  [place] [id number]  [phone]    Please let your surgeon know if:      You develop any open sores, shingles, burning or painful urination as these may increase your risk of an infection.   You no longer wish to have the surgery.   Any other personal circumstances change that may lead to the need to cancel or defer this surgery-such as being sick or getting admitted to any hospital within one week of your planned procedure.    Your contact details change, such as a change of address or phone number.    Starting now:     Please DO NOT drink alcohol or smoke for 24 hours before surgery. It is well known that quitting smoking can make a huge difference to your health and recovery from surgery. The longer you abstain from smoking, the better your chances of a healthy recovery. If you need help with quitting, call 4-800QUIT-NOW to be connected to a trained counselor who will discuss the best methods to help you quit.     Before your surgery:    Please stop all supplements 7 days prior to surgery. Or as directed by your surgeon.   Please stop taking NSAID pain medicine such as Advil and Motrin 7 days before surgery.    If you develop any fever, cough, cold, rashes, cuts, scratches, scrapes, urinary symptoms or infection anywhere on your body (including teeth and gums) prior to surgery, please call your surgeon’s office as soon as possible. This may require treatment to reduce the chance of  cancellation on the day of surgery.    The day before your surgery:   DIET- Do not eat any food after MIDNIGHT. May have 10 ounces of CLEAR LIQUIDS until TWO HOURS before your arrival time. This includes water, black tea or coffee (no milk ir cream), apple juice and electrolyte drinks (Gatorade). May chew gum until TWO hours before your surgery time.   Get a good night’s rest.  Use the special soap for bathing if you have been instructed to use one.    Scheduled surgery times may change and you will be notified if this occurs - please check your personal voicemail for any updates.     On the morning of surgery:   Wear comfortable, loose fitting clothes which open in the front. Please do not wear moisturizers, creams, lotions, makeup or perfume.    Please bring with you to surgery:   Photo ID and insurance card   Current list of medicines and allergies   Pacemaker/ Defibrillator/Heart stent cards   CPAP machine and mask    Slings/ splints/ crutches   A copy of your complete advanced directive/DHPOA.    Please do NOT bring with you to surgery:   All jewelry and valuables should be left at home.   Prosthetic devices such as contact lenses, hearing aids, dentures, eyelash extensions, hairpins and body piercings must be removed prior to going in to the surgical suite.    After outpatient surgery:   A responsible adult MUST accompany you at the time of discharge and stay with you for 24 hours after your surgery. You may NOT drive yourself home after surgery.    Do not drive, operate machinery, make critical decisions or do activities that require co-ordination or balance until after a night’s sleep.   Do not drink alcoholic beverages for 24 hours.   Instructions for resuming your medications will be provided by your surgeon.    CALL YOUR DOCTOR AFTER SURGERY IF YOU HAVE:     Chills and/or a fever of 101° F or higher.    Redness, swelling, pus or drainage from your surgical wound or a bad smell from the wound.     Lightheadedness, fainting or confusion.    Persistent vomiting (throwing up) and are not able to eat or drink for 12 hours.    Three or more loose, watery bowel movements in 24 hours (diarrhea).   Difficulty or pain while urinating( after non-urological surgery)    Pain and swelling in your legs, especially if it is only on one side.    Difficulty breathing or are breathing faster than normal.    Any new concerning symptoms.

## 2023-10-25 NOTE — PROGRESS NOTES
"Speech-Language Pathology    Outpatient Speech-Language Pathology Treatment     Patient Name: Hank Rick  MRN: [id number]  Today's Date: 10/25/2023     Time Calculation  Start Time: 1345  Stop Time: 1430  Time Calculation (min): 45 min      Current Problem:   1. Vocal cord weakness        2. Hoarseness of voice        3. Bowing of true vocal cord              SLP Assessment:   Patient presents for voice therapy s/p thyroplasty.      Recently presented to clinic with c/o reduced vocal quality with findings of poor respiratory drive and control impacting his vibratory potential. Recommendations for RMST with vocal strengthening.      Patient here with RMST - the breather. Patient denies frequent use 2/2 travel and busy schedule. Patient able to complete at 2:2 at 5 sets of 5 breaths. I anticipate he will be able to increase his inspiratory pressure to 3 within the next few days. Patient able to perform with min-mod buccal pocketing which resolved given clinician verbal/visual cue.      Completed PhoRTE with use of sustained [a]. MPT has demo'd sig improvement from 8-10s to an average of 17s this date. Completed addition of vocal glides and phrase targeting. Patient able to complete x8 glides without pitch breaks present.     Patient identified high frequency phrase: \"Hey Louise, do you want to go for a walk?\"    Plan to add up to 9 additional phrases for home practice.     Completed phrase targeting with high excitable tone and low demanding tone - min cues required.     Plan to continued PhoRTE protocol/RMST at next session.      Overall, patient continues to benefit from skilled ST intervention in the area of voice to promote healthy voicing at home and in the community environment.       Plan:  Inpatient/Swing Bed or Outpatient: Outpatient  Treatment/Interventions: Voice  SLP TX Plan: Continue Plan of Care  SLP Plan: Skilled SLP  SLP Frequency: Every other week  Duration: 12 weeks  SLP Discharge Recommendations: " Home independent  Discussed POC: Patient  Discussed Risks/Benefits: Yes, Patient  Patient/Caregiver Agreeable: Yes           Subjective   Current Problem:  Hoarseness s/p thyroplasty      Most Recent Visit:  SLP Received On: 10/10/23     General Visit Information:   Reason for Referral: hoarseness  Referred By: Dr. Dee  Past Medical History Relevant to Rehab: s/p bilateral thyroplasty, HL  Arrival: Independent        Pain Assessment:   Pain Assessment: 0-10  Pain Score: 4  Pain Type: Chronic pain  Pain Location: Back              Objective   Patient will increase vocal wellness and decrease phonotrauma in adherence with clinician prescribed vocal hygiene and wellness program per patient report.   Patient will increase ability to produce voice without tension within 5 minute conversational task x80% accuracy as judged by clinician observation and/or patient report.   Patient will demonstrate independent use of voice/breathing techniques x80% accuracy.  Patient will increase the strength of laryngeal/respiratory musculature.      Projected STG completion date: x4  ST sessions conducted x1/eowk     Voice:  Voice Interventions: Respiratory Retraining

## 2023-10-25 NOTE — PROGRESS NOTES
Physical Therapy  Physical Therapy Treatment    Patient Name: Hank Rick  MRN: [id number]  Today's Date: 10/25/2023  Insurance: Medicare; $0 co-pay  Visit Limit: MN  Visit: 4  Time in: 0830  Time out: 0910  Total Time: 40 min    Current Problem  1. Back pain with radiation          Pain: 5/10       Subjective:   Subjective   Patient reports that he continues to have pain with functional activities.    Objective:   Objective   Gait: normal gait pattern within pool    Treatments:     Exercise Sets/Reps Comments   Forward walking 5' 4 foot depth   Lateral walking 5'    Backward walking 5'    floating 5' 7 foot depth w/ 1 white noodle   Hip abd 5'    scissors 5'    Reverse crunches 5'    bicycle 5'      Assessment: Pt continued to perform four, seven foot depth exercises without complaints of pain. Pt was able to perform exercises independently with less than min cueing needed to perform correctly. Pt was appropriately challenged with exercise selections, included deep water exercises for non-weight bearing benefit.        Plan: Discharge from aquatic therapy next session.       Bruno Walters, PT

## 2023-10-25 NOTE — PERIOPERATIVE NURSING NOTE
Spoke with Darci in nursing office in Dr Bob office regarding stopping Plavix and pt having recent stroke in May. Patient may continue and take day of surgery. Medications reviewed today's with patient

## 2023-10-25 NOTE — CPM/PAT H&P
CPM/PAT Evaluation       Name: Hank Rick (Hank Rick)  /Age: 1945/78 y.o.     In-Person       Chief Complaint: Obstructive sleep apnea    HPI  A 78-year-old male with obstructive sleep apnea.  History significant for CVA 2023, vertebral artery stenosis, essential tremor, memory impairment, hypertension.  History of WALKER diagnosed several years ago and unable to tolerate CPAP.  Denies fever, chills, sore throat or dysphagia.  He is scheduled for sleep nasopharyngoscopy.    Past Medical History:   Diagnosis Date    Cerebral vascular accident (CMS/HCC) 2023    COVID-19 11/15/2022    COVID-19 virus infection    Esophageal obstruction 05/15/2022    Esophageal stricture    Essential tremor 2022    Essential tremor    GERD (gastroesophageal reflux disease)     Hard of hearing     Memory impairment     Olecranon bursitis, left elbow 2016    Olecranon bursitis of left elbow    Personal history of diseases of the blood and blood-forming organs and certain disorders involving the immune mechanism 2022    History of anemia    Personal history of other diseases of the digestive system 2014    History of gastroesophageal reflux (GERD)    Personal history of other diseases of the digestive system 2021    History of hiatal hernia    Personal history of other specified conditions 2018    History of urinary frequency    Rash and other nonspecific skin eruption 2021    Skin rash    Salmonella enteritis     Salmonella enteritis    TIA (transient ischemic attack)     Vertebral artery stenosis        Past Surgical History:   Procedure Laterality Date    BACK SURGERY      CT HEAD ANGIO W AND WO IV CONTRAST  2023    CT HEAD ANGIO W AND WO IV CONTRAST 2023 U CT    CT NECK ANGIO W AND WO IV CONTRAST  2023    CT NECK ANGIO W AND WO IV CONTRAST 2023 U CT    MR HEAD ANGIO WO IV CONTRAST  2023    MR HEAD ANGIO WO IV CONTRAST 2023 [facility] MRI    MR  "NECK ANGIO WO IV CONTRAST  05/09/2023    MR NECK ANGIO WO IV CONTRAST 5/9/2023 [facility] MRI    OTHER SURGICAL HISTORY  01/07/2015    Esophagogastric Fundoplasty Laparoscopic For Paraesophageal Hernia Repair    OTHER SURGICAL HISTORY  03/05/2014    Shoulder Surgery Left    OTHER SURGICAL HISTORY      REFRACTIVE SURGERY  03/05/2014    Corneal LASIK    TONSILLECTOMY  11/18/2014    Tonsillectomy         Allergies   Allergen Reactions    Pollen Extracts Other    Gabapentin Anxiety     LABS done 8/23/2023    Review of Systems   Constitutional: Negative.    HENT: Negative.     Eyes: Negative.    Respiratory: Negative.     Cardiovascular: Negative.    Gastrointestinal: Negative.    Genitourinary: Negative.    Musculoskeletal: Negative.         Tremor   Skin: Negative.    Neurological: Negative.    Psychiatric/Behavioral: Negative.          Physical Exam  Vitals reviewed.   HENT:      Head: Normocephalic and atraumatic.      Ears:      Comments: Bilateral hearing aids     Mouth/Throat:      Mouth: Mucous membranes are moist.   Eyes:      Pupils: Pupils are equal, round, and reactive to light.   Cardiovascular:      Rate and Rhythm: Normal rate and regular rhythm.   Pulmonary:      Breath sounds: Normal breath sounds.   Abdominal:      Palpations: Abdomen is soft.   Musculoskeletal:         General: Normal range of motion.      Cervical back: Normal range of motion.   Skin:     General: Skin is warm and dry.   Neurological:      Mental Status: He is alert and oriented to person, place, and time.   Psychiatric:         Mood and Affect: Mood normal.          PAT AIRWAY:   Airway:     Mallampati::  II    Neck ROM::  Full  normal        /83 (BP Location: Left arm, Patient Position: Sitting, BP Cuff Size: Adult)   Pulse 61   Temp 36.3 °C (97.3 °F) (Temporal)   Resp 16   Ht 1.854 m (6' 1\")   Wt 87 kg (191 lb 12.8 oz)   SpO2 95%   BMI 25.30 kg/m²          Stop Bang Score      Flowsheet Row Most Recent Value   Do you snore " loudly? 1   Do you often feel tired or fatigued after your sleep? 1   Has anyone ever observed you stop breathing in your sleep? 0   Do you have or are you being treated for high blood pressure? 1   Recent BMI (Calculated) 25.2   Is BMI greater than 35 kg/m2? 0=No   Age older than 50 years old? 1=Yes   Is your neck circumference greater than 17 inches (Male) or 16 inches (Female)? 0          CHADS 2: 8.5%  DASI score: 12.75  ROSA score: 4.3  RCRI: 6.6%  ASA III  ARISCAT 1.6%      Assessment and Plan:     Obstructive sleep apnea plan: Sleep nasopharyngoscopy  CVA/TIA 5/9/2023-takes Plavix-denies recurrence or residual  Vertebral artery stenosis  Hypertension  Dyslipidemia  Essential tremor  Memory impairment  GERD

## 2023-10-26 ENCOUNTER — TELEPHONE (OUTPATIENT)
Dept: PRIMARY CARE | Facility: CLINIC | Age: 78
End: 2023-10-26
Payer: MEDICARE

## 2023-10-26 DIAGNOSIS — M54.50 CHRONIC LOW BACK PAIN, UNSPECIFIED BACK PAIN LATERALITY, UNSPECIFIED WHETHER SCIATICA PRESENT: Primary | ICD-10-CM

## 2023-10-26 DIAGNOSIS — G89.29 CHRONIC LOW BACK PAIN, UNSPECIFIED BACK PAIN LATERALITY, UNSPECIFIED WHETHER SCIATICA PRESENT: Primary | ICD-10-CM

## 2023-10-26 DIAGNOSIS — G89.4 CHRONIC PAIN SYNDROME: ICD-10-CM

## 2023-10-26 RX ORDER — OXYCODONE HYDROCHLORIDE 5 MG/1
5 TABLET ORAL EVERY 6 HOURS PRN
Qty: 12 TABLET | Refills: 0 | Status: SHIPPED | OUTPATIENT
Start: 2023-10-26 | End: 2023-10-29

## 2023-10-26 NOTE — TELEPHONE ENCOUNTER
Pt. Requesting refill of Oxycodone. States is going on a trip tomorrow and his back is still hurting.

## 2023-10-31 ENCOUNTER — OFFICE VISIT (OUTPATIENT)
Dept: UROLOGY | Facility: CLINIC | Age: 78
End: 2023-10-31
Payer: MEDICARE

## 2023-10-31 VITALS — TEMPERATURE: 97.1 F

## 2023-10-31 DIAGNOSIS — N40.1 BENIGN PROSTATIC HYPERPLASIA (BPH) WITH URINARY URGENCY: ICD-10-CM

## 2023-10-31 DIAGNOSIS — R39.15 BENIGN PROSTATIC HYPERPLASIA (BPH) WITH URINARY URGENCY: ICD-10-CM

## 2023-10-31 DIAGNOSIS — R35.1 NOCTURIA: Primary | ICD-10-CM

## 2023-10-31 DIAGNOSIS — N52.9 MALE ERECTILE DISORDER: ICD-10-CM

## 2023-10-31 LAB
POC APPEARANCE, URINE: CLEAR
POC BILIRUBIN, URINE: NEGATIVE
POC BLOOD, URINE: NEGATIVE
POC COLOR, URINE: YELLOW
POC GLUCOSE, URINE: NEGATIVE MG/DL
POC KETONES, URINE: NEGATIVE MG/DL
POC LEUKOCYTES, URINE: NEGATIVE
POC NITRITE,URINE: NEGATIVE
POC PH, URINE: 7 PH
POC PROTEIN, URINE: NEGATIVE MG/DL
POC SPECIFIC GRAVITY, URINE: 1.01
POC UROBILINOGEN, URINE: 0.2 EU/DL

## 2023-10-31 PROCEDURE — 81002 URINALYSIS NONAUTO W/O SCOPE: CPT | Performed by: UROLOGY

## 2023-10-31 PROCEDURE — 1159F MED LIST DOCD IN RCRD: CPT | Performed by: UROLOGY

## 2023-10-31 PROCEDURE — 1036F TOBACCO NON-USER: CPT | Performed by: UROLOGY

## 2023-10-31 PROCEDURE — 3077F SYST BP >= 140 MM HG: CPT | Performed by: UROLOGY

## 2023-10-31 PROCEDURE — 76872 US TRANSRECTAL: CPT | Performed by: UROLOGY

## 2023-10-31 PROCEDURE — 3079F DIAST BP 80-89 MM HG: CPT | Performed by: UROLOGY

## 2023-10-31 PROCEDURE — 1125F AMNT PAIN NOTED PAIN PRSNT: CPT | Performed by: UROLOGY

## 2023-10-31 PROCEDURE — 51741 ELECTRO-UROFLOWMETRY FIRST: CPT | Performed by: UROLOGY

## 2023-10-31 PROCEDURE — 1160F RVW MEDS BY RX/DR IN RCRD: CPT | Performed by: UROLOGY

## 2023-10-31 PROCEDURE — 99214 OFFICE O/P EST MOD 30 MIN: CPT | Performed by: UROLOGY

## 2023-10-31 RX ORDER — SILDENAFIL 100 MG/1
50-100 TABLET, FILM COATED ORAL AS NEEDED
Qty: 30 TABLET | Refills: 6 | Status: SHIPPED | OUTPATIENT
Start: 2023-10-31 | End: 2024-10-30

## 2023-10-31 RX ORDER — CEPHALEXIN 500 MG/1
500 CAPSULE ORAL ONCE
Qty: 1 CAPSULE | Refills: 0 | Status: SHIPPED | OUTPATIENT
Start: 2023-10-31 | End: 2023-11-06

## 2023-10-31 ASSESSMENT — PAIN SCALES - GENERAL: PAINLEVEL: 4

## 2023-10-31 NOTE — PROGRESS NOTES
Subjective   Hank Rick is a 78 y.o. male presents today as a new patient for BPH. History of HoLEP with Dr. Ohara in April 2022. He reports he has still having bothersome urinary urgency and nocturia. He will have occasional frequency during the day. He also reports nocturia x3-4. He does have sleep apnea but does not wear a CPAP. He currently being evaluated by sleep medicine for other options. He will have intermittent urge incontinence. He denies urinary hesitancy. He has failed oxybutynin, detrol LA, vesicare, myrbetriq, and trospium. Patient denies gross hematuria, dysuria, and flank pain. Patient denies a history of kidney stones. Patient is a non smoker.     Objective   Past Medical History:   Diagnosis Date    Cerebral vascular accident (CMS/[facility]) 05/09/2023    COVID-19 11/15/2022    COVID-19 virus infection    Esophageal obstruction 05/15/2022    Esophageal stricture    Essential tremor 12/29/2022    Essential tremor    GERD (gastroesophageal reflux disease)     Hard of hearing     Memory impairment     Olecranon bursitis, left elbow 04/26/2016    Olecranon bursitis of left elbow    Personal history of diseases of the blood and blood-forming organs and certain disorders involving the immune mechanism 05/17/2022    History of anemia    Personal history of other diseases of the digestive system 11/13/2014    History of gastroesophageal reflux (GERD)    Personal history of other diseases of the digestive system 01/14/2021    History of hiatal hernia    Personal history of other specified conditions 11/29/2018    History of urinary frequency    Rash and other nonspecific skin eruption 11/02/2021    Skin rash    Salmonella enteritis     Salmonella enteritis    TIA (transient ischemic attack)     Vertebral artery stenosis      Past Surgical History:   Procedure Laterality Date    BACK SURGERY      CT HEAD ANGIO W AND WO IV CONTRAST  05/12/2023    CT HEAD ANGIO W AND WO IV CONTRAST 5/12/2023 U CT    CT NECK  ANGIO W AND WO IV CONTRAST  05/12/2023    CT NECK ANGIO W AND WO IV CONTRAST 5/12/2023 U CT    MR HEAD ANGIO WO IV CONTRAST  05/09/2023    MR HEAD ANGIO WO IV CONTRAST 5/9/2023 [facility]    MR NECK ANGIO WO IV CONTRAST  05/09/2023    MR NECK ANGIO WO IV CONTRAST 5/9/2023 [facility] MRI    OTHER SURGICAL HISTORY  01/07/2015    Esophagogastric Fundoplasty Laparoscopic For Paraesophageal Hernia Repair    OTHER SURGICAL HISTORY  03/05/2014    Shoulder Surgery Left    OTHER SURGICAL HISTORY      REFRACTIVE SURGERY  03/05/2014    Corneal LASIK    TONSILLECTOMY  11/18/2014    Tonsillectomy     Current Outpatient Medications on File Prior to Visit   Medication Sig Dispense Refill    albuterol 90 mcg/actuation inhaler Inhale 2 puffs. Every 4 to 6 hours as needed as directed      amitriptyline (Elavil) 10 mg tablet Take 1-2 tablets (10-20 mg) by mouth once daily at bedtime.      amLODIPine (Norvasc) 2.5 mg tablet Take 1 tablet (2.5 mg) by mouth once daily.      atorvastatin (Lipitor) 40 mg tablet Take 1 tablet (40 mg) by mouth once daily. 90 tablet 2    azelastine (Astelin) 137 mcg (0.1 %) nasal spray Administer 2 sprays into each nostril 2 times a day. 30 mL 5    clopidogrel (Plavix) 75 mg tablet Take 1 tablet (75 mg) by mouth once daily. 90 tablet 2    diclofenac sodium (Voltaren) 1 % gel gel Apply topically 2 times a day as needed.      docusate sodium (Colace) 100 mg capsule Take 1 capsule (100 mg) by mouth 2 times a day.      fexofenadine (Allegra) 180 mg tablet Take 1 tablet (180 mg) by mouth once daily.      fluticasone (Flonase) 50 mcg/actuation nasal spray Administer 1-2 sprays into each nostril once daily.      lidocaine 4 % patch APPLY 1 PATCH TOPICALLY TO AFFECTED AREA ONCE DAILY. LEAVE IN PLACE FOR 12 HOURS, THEN REMOVE AND KEEP OFF FOR 12 HOURS. 15 patch 0    meloxicam (Mobic) 15 mg tablet take 1 tablet by mouth once daily if needed for MODERATE PAIN ( PAIN SCALE 4-6 ) 30 tablet 1    memantine (Namenda) 5 mg tablet 1  po at bedtime x 4 weeks, then 1 po bid      montelukast (Singulair) 10 mg tablet Take 1 tablet (10 mg) by mouth once daily.      omeprazole OTC (PriLOSEC OTC) 20 mg EC tablet Take 1 tablet (20 mg) by mouth once daily.      propranolol (Inderal) 10 mg tablet Take 1 tablet (10 mg) by mouth 3 times a day.      propranolol LA (Inderal LA) 60 mg 24 hr capsule Take 1 capsule (60 mg) by mouth once daily.      sildenafil (Viagra) 100 mg tablet Take 0.5-1 tablets ( mg) by mouth if needed (1 hour prior to sexual activity (max 1 per day)).      tiZANidine (Zanaflex) 4 mg tablet Take 1 tablet (4 mg) by mouth every 6 hours if needed for muscle spasms.      topiramate (Topamax) 25 mg tablet Take 1-2 tablets (25-50 mg) by mouth once daily at bedtime.      traZODone (Desyrel) 50 mg tablet Take 1 tablet (50 mg) by mouth if needed.      atorvastatin (Lipitor) 40 mg tablet TAKE 1 TABLET BY MOUTH ONCE DAILY (Patient not taking: Reported on 10/25/2023) 30 tablet 1    [] oxyCODONE (Roxicodone) 5 mg immediate release tablet Take 1 tablet (5 mg) by mouth every 6 hours if needed for moderate pain (4 - 6) for up to 3 days. 12 tablet 0    [DISCONTINUED] acetaminophen-codeine (Tylenol w/ Codeine #3) 300-30 mg tablet Take 1 tablet by mouth every 6 hours.      [DISCONTINUED] benzonatate (Tessalon) 100 mg capsule Take 1 capsule (100 mg) by mouth 3 times a day as needed for cough. Do not crush or chew.      [DISCONTINUED] benzonatate (Tessalon) 100 mg capsule TAKE 1 CAPSULE BY MOUTH THREE TIMES A DAY 90 capsule 0    [DISCONTINUED] chlorhexidine (Hibiclens) 4 % external liquid Apply topically. Use as directed for preoperative shower      [DISCONTINUED] clopidogrel (Plavix) 75 mg tablet TAKE 1 TABLET BY MOUTH ONCE DAILY END DATE (23) 20 tablet 0    [DISCONTINUED] fluocinonide (Lidex) 0.05 % ointment Apply topically if needed.      [DISCONTINUED] lidocaine (LMX) 4 % cream Apply 0.1 g topically once daily.      [DISCONTINUED]  methylPREDNISolone (Medrol) 4 mg tablet Take by mouth. Take as directed 21 therapy pack      [DISCONTINUED] mupirocin (Bactroban) 2 % ointment Apply topically 2 times a day. 3 days before surgery      [DISCONTINUED] omeprazole (PriLOSEC) 20 mg DR capsule Take 1 capsule by mouth bid for 2 weeks then 1 capsule daily      [DISCONTINUED] oxyCODONE (Roxicodone) 5 mg immediate release tablet Take 1 tablet (5 mg) by mouth every 4 hours if needed for severe pain (7 - 10).      [DISCONTINUED] oxyCODONE-acetaminophen (Percocet) 5-325 mg tablet Take 1 tablet by mouth once daily as needed (pain).      [DISCONTINUED] prednisoLONE acetate (Pred-Forte) 1 % ophthalmic suspension Administer 1 drop into the right eye 4 times a day. Refer to prescription notes      [DISCONTINUED] propranolol LA (Inderal LA) 120 mg 24 hr capsule Take 1 capsule (120 mg) by mouth once daily in the morning. Take before meals.      [DISCONTINUED] sennosides (Senokot) 8.6 mg tablet Take 1 tablet (8.6 mg) by mouth once daily.      [DISCONTINUED] sennosides (Senokot) 8.6 mg tablet TAKE 1 TABLET BY MOUTH TWO TIMES A DAY AS NEEDED FOR CONSTIPATION 60 tablet 0    [DISCONTINUED] vibegron (Gemtesa) 75 mg tablet Take 1 tablet (75 mg) by mouth once daily.       No current facility-administered medications on file prior to visit.     Allergies   Allergen Reactions    Pollen Extracts Other    Gabapentin Anxiety       Temp 36.2 °C (97.1 °F)   Physical Exam    Lab Review  Urine analysis shows negative  Lab Results   Component Value Date    PSA 0.62 12/05/2022     PVR 0 ML   Uroflow  study demonstrated voided volume of 143 and maximal flow rate of 18.6 ml/s.       Assessment/Plan   Diagnoses and all orders for this visit:  Nocturia  -     Measure post void residual  -     POCT UA (nonautomated) manually resulted  Benign prostatic hyperplasia (BPH) with urinary urgency    BPH with worsening OAB urinary symptoms and nocturia   Sleep apnea - currently being evaluated by  sleep medicine    We will proceed with cystoscopy to evaluate channel patency in anticipation of Botox injections.     The risks of cystoscopy were discussed with the patient in great detail, including risk of hematuria, UTI and discomfort. Antibiotic will be prescribed to be taken 1 hour prior to the procedure. Patient agrees to proceed.     Risks of intravesical Botox injection were discussed with the patient in great detail. Adverse reactions reported have been UTI, dysuria, hematuria, elevated PVR and urinary retention in up to 15% of patients. I explained that urinary retention is not permanent and usually resolves within 6 weeks.     3. Erectile Dysfunction     Patient currently takes Sildenafil 100mg with good response. We will refill this.     All questions were answered to the patient's satisfaction. Patient agrees with the plan and wishes to proceed. Follow-up will be scheduled appropriately.     Scribed for Dr. Alaniz by Flaquita Araiza. I , Dr Alaniz, have personally reviewed and agreed with the information entered by the Virtual Scribe.

## 2023-10-31 NOTE — PROGRESS NOTES
78 year old male presents today as a new patient for BPH. History of HoLEP with Dr. Ohara in April 2022. He reports he has still having bothersome urinary urgency and nocturia. He will have occasional frequency during the day. He also reports nocturia x3-4. He does have sleep apnea but does not wear a CPAP. He currently being evaluated by sleep medicine for other options. He will have intermittent urge incontinence. He denies urinary hesitancy. He has failed oxybutynin, detrol LA, vesicare, myrbetriq, and trospium. Patient denies gross hematuria, dysuria, and flank pain. Patient denies a history of kidney stones. Patient is a non smoker.

## 2023-10-31 NOTE — PATIENT INSTRUCTIONS
Take antibiotic 1 hour prior to cystoscopy.  We will need a urine sample during that appointment, so please arrive with a full enough bladder to leave a sample.  There are no restrictions in medications, eating/drinking, and driving.

## 2023-11-01 ENCOUNTER — OFFICE VISIT (OUTPATIENT)
Dept: ORTHOPEDIC SURGERY | Facility: CLINIC | Age: 78
End: 2023-11-01
Payer: MEDICARE

## 2023-11-01 ENCOUNTER — TREATMENT (OUTPATIENT)
Dept: PHYSICAL THERAPY | Facility: CLINIC | Age: 78
End: 2023-11-01
Payer: MEDICARE

## 2023-11-01 DIAGNOSIS — M54.9 BACK PAIN WITH RADIATION: Primary | ICD-10-CM

## 2023-11-01 DIAGNOSIS — M48.061 SPINAL STENOSIS OF LUMBAR REGION, UNSPECIFIED WHETHER NEUROGENIC CLAUDICATION PRESENT: Primary | ICD-10-CM

## 2023-11-01 PROCEDURE — 97113 AQUATIC THERAPY/EXERCISES: CPT | Mod: GP

## 2023-11-01 PROCEDURE — 3077F SYST BP >= 140 MM HG: CPT | Performed by: ORTHOPAEDIC SURGERY

## 2023-11-01 PROCEDURE — 1036F TOBACCO NON-USER: CPT | Performed by: ORTHOPAEDIC SURGERY

## 2023-11-01 PROCEDURE — 1160F RVW MEDS BY RX/DR IN RCRD: CPT | Performed by: ORTHOPAEDIC SURGERY

## 2023-11-01 PROCEDURE — 1125F AMNT PAIN NOTED PAIN PRSNT: CPT | Performed by: ORTHOPAEDIC SURGERY

## 2023-11-01 PROCEDURE — 3079F DIAST BP 80-89 MM HG: CPT | Performed by: ORTHOPAEDIC SURGERY

## 2023-11-01 PROCEDURE — 1159F MED LIST DOCD IN RCRD: CPT | Performed by: ORTHOPAEDIC SURGERY

## 2023-11-01 PROCEDURE — 99214 OFFICE O/P EST MOD 30 MIN: CPT | Performed by: ORTHOPAEDIC SURGERY

## 2023-11-01 NOTE — LETTER
November 1, 2023     Sonu Larose MD  [address]  [address]  [facility] [address]    Patient: Hank Rick   YOB: 1945   Date of Visit: 11/1/2023       Dear Dr. Sonu Larose MD:    Thank you for referring Hank Rick to me for evaluation. Below are my notes for this consultation.  If you have questions, please do not hesitate to call me. I look forward to following your patient along with you.       Sincerely,     Shad Sanon MD      CC: No Recipients  ______________________________________________________________________________________    Patient returns.  He has ongoing symptoms of back and leg pain.  His symptoms are predominantly low back pain, 80% and less so leg pain, 20%.    Again, he had interspinous spacers placed at 2 levels in May 2022.    He did have a neurologic event earlier this year and continues to be followed by a neurocognitive specialist.    He is here today with his wife.    We again have examined him.  No change in his neurologic status.    We have again reviewed his imaging which shows a moderate coronal plane deformity.  Interspinous spacers at L3-4 and L4-5.    His MRI shows moderate multilevel spinal stenosis.    He and his wife and I have discussed his situation at length.    I think he should continue to exhaust nonsurgical management.  At his age, with the degree of axial back pain versus lumbar radicular pain, I think surgery would be somewhat unpredictable.  As well, clearly he has had some cognitive issues and these potentially could be magnified with a major revision spine surgery.    He and his wife agree.    We will plan to see him back on an as-needed basis.    ** Dictated with voice recognition software and not immediately reviewed for errors in grammar and/or spelling **

## 2023-11-01 NOTE — PROGRESS NOTES
Patient returns.  He has ongoing symptoms of back and leg pain.  His symptoms are predominantly low back pain, 80% and less so leg pain, 20%.    Again, he had interspinous spacers placed at 2 levels in May 2022.    He did have a neurologic event earlier this year and continues to be followed by a neurocognitive specialist.    He is here today with his wife.    We again have examined him.  No change in his neurologic status.    We have again reviewed his imaging which shows a moderate coronal plane deformity.  Interspinous spacers at L3-4 and L4-5.    His MRI shows moderate multilevel spinal stenosis.    He and his wife and I have discussed his situation at length.    I think he should continue to exhaust nonsurgical management.  At his age, with the degree of axial back pain versus lumbar radicular pain, I think surgery would be somewhat unpredictable.  As well, clearly he has had some cognitive issues and these potentially could be magnified with a major revision spine surgery.    He and his wife agree.    We will plan to see him back on an as-needed basis.    ** Dictated with voice recognition software and not immediately reviewed for errors in grammar and/or spelling **

## 2023-11-01 NOTE — PROGRESS NOTES
Physical Therapy  Physical Therapy Treatment    Patient Name: Hank Rick  MRN: [id number]  Today's Date: 11/1/2023  Insurance: Medicare; $0 co-pay  Visit Limit: MN  Visit: 5  Time Calculation  Start Time: 0835  Stop Time: 0915  Time Calculation (min): 40 min        Current Problem  1. Back pain with radiation               Pain: 3/10       Subjective:   Subjective   Patient reports that he has been doing okay since last session.    Objective:   Objective   Observation: slight forward trunk flexion      Treatments:   Exercise Sets/Reps Comments   Forward, backward walking 5' 4 foot depth   Mini squats 5'    Dynamic marching 5'    scissors 10' 7 foot depth w/ 1 white noodle   bicycle 10'    Hip abd 5'        Assessment: Continued with mix of four and seven foot depth exercises to improve gait and functional ability. Pt tolerated treatment without complaints of pain. Pt was challenged with increased intensity, noted by increased duration of selected exercises.       Plan: Discharge next visit.       Bruno Walters, PT

## 2023-11-06 ENCOUNTER — ANESTHESIA EVENT (OUTPATIENT)
Dept: OPERATING ROOM | Facility: HOSPITAL | Age: 78
End: 2023-11-06
Payer: MEDICARE

## 2023-11-06 ENCOUNTER — ANESTHESIA (OUTPATIENT)
Dept: OPERATING ROOM | Facility: HOSPITAL | Age: 78
End: 2023-11-06
Payer: MEDICARE

## 2023-11-06 ENCOUNTER — HOSPITAL ENCOUNTER (OUTPATIENT)
Facility: HOSPITAL | Age: 78
Setting detail: OUTPATIENT SURGERY
Discharge: HOME | End: 2023-11-06
Attending: OTOLARYNGOLOGY | Admitting: OTOLARYNGOLOGY
Payer: MEDICARE

## 2023-11-06 ENCOUNTER — APPOINTMENT (OUTPATIENT)
Dept: OTOLARYNGOLOGY | Facility: CLINIC | Age: 78
End: 2023-11-06
Payer: MEDICARE

## 2023-11-06 VITALS
WEIGHT: 188 LBS | HEIGHT: 73 IN | TEMPERATURE: 97.2 F | DIASTOLIC BLOOD PRESSURE: 72 MMHG | BODY MASS INDEX: 24.92 KG/M2 | OXYGEN SATURATION: 96 % | SYSTOLIC BLOOD PRESSURE: 160 MMHG | RESPIRATION RATE: 16 BRPM | HEART RATE: 47 BPM

## 2023-11-06 PROCEDURE — 7100000010 HC PHASE TWO TIME - EACH INCREMENTAL 1 MINUTE: Performed by: OTOLARYNGOLOGY

## 2023-11-06 PROCEDURE — 2500000005 HC RX 250 GENERAL PHARMACY W/O HCPCS: Performed by: ANESTHESIOLOGIST ASSISTANT

## 2023-11-06 PROCEDURE — 99100 ANES PT EXTEME AGE<1 YR&>70: CPT | Performed by: ANESTHESIOLOGY

## 2023-11-06 PROCEDURE — 2720000007 HC OR 272 NO HCPCS: Performed by: OTOLARYNGOLOGY

## 2023-11-06 PROCEDURE — 2500000004 HC RX 250 GENERAL PHARMACY W/ HCPCS (ALT 636 FOR OP/ED): Performed by: OTOLARYNGOLOGY

## 2023-11-06 PROCEDURE — A42975: Performed by: ANESTHESIOLOGY

## 2023-11-06 PROCEDURE — 2500000001 HC RX 250 WO HCPCS SELF ADMINISTERED DRUGS (ALT 637 FOR MEDICARE OP): Performed by: OTOLARYNGOLOGY

## 2023-11-06 PROCEDURE — A42975: Performed by: ANESTHESIOLOGIST ASSISTANT

## 2023-11-06 PROCEDURE — 3700000002 HC GENERAL ANESTHESIA TIME - EACH INCREMENTAL 1 MINUTE: Performed by: OTOLARYNGOLOGY

## 2023-11-06 PROCEDURE — 7100000009 HC PHASE TWO TIME - INITIAL BASE CHARGE: Performed by: OTOLARYNGOLOGY

## 2023-11-06 PROCEDURE — 3600000001 HC OR TIME - INITIAL BASE CHARGE - PROCEDURE LEVEL ONE: Performed by: OTOLARYNGOLOGY

## 2023-11-06 PROCEDURE — 7100000001 HC RECOVERY ROOM TIME - INITIAL BASE CHARGE: Performed by: OTOLARYNGOLOGY

## 2023-11-06 PROCEDURE — 3700000001 HC GENERAL ANESTHESIA TIME - INITIAL BASE CHARGE: Performed by: OTOLARYNGOLOGY

## 2023-11-06 PROCEDURE — 7100000002 HC RECOVERY ROOM TIME - EACH INCREMENTAL 1 MINUTE: Performed by: OTOLARYNGOLOGY

## 2023-11-06 PROCEDURE — 2500000004 HC RX 250 GENERAL PHARMACY W/ HCPCS (ALT 636 FOR OP/ED): Performed by: ANESTHESIOLOGIST ASSISTANT

## 2023-11-06 PROCEDURE — 42975 DISE EVAL SLP DO BRTH FLX DX: CPT | Performed by: OTOLARYNGOLOGY

## 2023-11-06 PROCEDURE — 3600000006 HC OR TIME - EACH INCREMENTAL 1 MINUTE - PROCEDURE LEVEL ONE: Performed by: OTOLARYNGOLOGY

## 2023-11-06 RX ORDER — PROPOFOL 10 MG/ML
INJECTION, EMULSION INTRAVENOUS CONTINUOUS PRN
Status: DISCONTINUED | OUTPATIENT
Start: 2023-11-06 | End: 2023-11-06

## 2023-11-06 RX ORDER — ONDANSETRON 4 MG/1
4 TABLET, ORALLY DISINTEGRATING ORAL EVERY 6 HOURS
Status: DISCONTINUED | OUTPATIENT
Start: 2023-11-06 | End: 2023-11-06 | Stop reason: HOSPADM

## 2023-11-06 RX ORDER — ACETAMINOPHEN 325 MG/1
650 TABLET ORAL EVERY 6 HOURS PRN
Status: DISCONTINUED | OUTPATIENT
Start: 2023-11-06 | End: 2023-11-06 | Stop reason: HOSPADM

## 2023-11-06 RX ORDER — LIDOCAINE HYDROCHLORIDE 10 MG/ML
0.1 INJECTION, SOLUTION EPIDURAL; INFILTRATION; INTRACAUDAL; PERINEURAL ONCE
Status: DISCONTINUED | OUTPATIENT
Start: 2023-11-06 | End: 2023-11-06 | Stop reason: HOSPADM

## 2023-11-06 RX ORDER — FENTANYL CITRATE 50 UG/ML
50 INJECTION, SOLUTION INTRAMUSCULAR; INTRAVENOUS EVERY 5 MIN PRN
Status: DISCONTINUED | OUTPATIENT
Start: 2023-11-06 | End: 2023-11-06 | Stop reason: HOSPADM

## 2023-11-06 RX ORDER — MIDAZOLAM HYDROCHLORIDE 1 MG/ML
1 INJECTION, SOLUTION INTRAMUSCULAR; INTRAVENOUS ONCE AS NEEDED
Status: DISCONTINUED | OUTPATIENT
Start: 2023-11-06 | End: 2023-11-06 | Stop reason: HOSPADM

## 2023-11-06 RX ORDER — LIDOCAINE HYDROCHLORIDE 10 MG/ML
INJECTION, SOLUTION EPIDURAL; INFILTRATION; INTRACAUDAL; PERINEURAL AS NEEDED
Status: DISCONTINUED | OUTPATIENT
Start: 2023-11-06 | End: 2023-11-06

## 2023-11-06 RX ORDER — HYDROCODONE BITARTRATE AND ACETAMINOPHEN 5; 325 MG/1; MG/1
1 TABLET ORAL EVERY 6 HOURS PRN
Status: DISCONTINUED | OUTPATIENT
Start: 2023-11-06 | End: 2023-11-06 | Stop reason: HOSPADM

## 2023-11-06 RX ORDER — HYDRALAZINE HYDROCHLORIDE 20 MG/ML
5 INJECTION INTRAMUSCULAR; INTRAVENOUS EVERY 30 MIN PRN
Status: DISCONTINUED | OUTPATIENT
Start: 2023-11-06 | End: 2023-11-06 | Stop reason: HOSPADM

## 2023-11-06 RX ORDER — ONDANSETRON HYDROCHLORIDE 2 MG/ML
4 INJECTION, SOLUTION INTRAVENOUS ONCE AS NEEDED
Status: DISCONTINUED | OUTPATIENT
Start: 2023-11-06 | End: 2023-11-06 | Stop reason: HOSPADM

## 2023-11-06 RX ORDER — SODIUM CHLORIDE, SODIUM LACTATE, POTASSIUM CHLORIDE, CALCIUM CHLORIDE 600; 310; 30; 20 MG/100ML; MG/100ML; MG/100ML; MG/100ML
100 INJECTION, SOLUTION INTRAVENOUS CONTINUOUS
Status: DISCONTINUED | OUTPATIENT
Start: 2023-11-06 | End: 2023-11-06 | Stop reason: HOSPADM

## 2023-11-06 RX ORDER — OXYMETAZOLINE HCL 0.05 %
2 SPRAY, NON-AEROSOL (ML) NASAL ONCE
Status: COMPLETED | OUTPATIENT
Start: 2023-11-06 | End: 2023-11-06

## 2023-11-06 RX ORDER — OXYCODONE HYDROCHLORIDE 15 MG/1
15 TABLET, FILM COATED, EXTENDED RELEASE ORAL EVERY 12 HOURS
COMMUNITY
End: 2023-12-28 | Stop reason: ALTCHOICE

## 2023-11-06 RX ORDER — ALBUTEROL SULFATE 0.83 MG/ML
2.5 SOLUTION RESPIRATORY (INHALATION) ONCE AS NEEDED
Status: DISCONTINUED | OUTPATIENT
Start: 2023-11-06 | End: 2023-11-06 | Stop reason: HOSPADM

## 2023-11-06 RX ORDER — PROPOFOL 10 MG/ML
INJECTION, EMULSION INTRAVENOUS AS NEEDED
Status: DISCONTINUED | OUTPATIENT
Start: 2023-11-06 | End: 2023-11-06

## 2023-11-06 RX ADMIN — PROPOFOL 20 MG: 10 INJECTION, EMULSION INTRAVENOUS at 11:19

## 2023-11-06 RX ADMIN — OXYMETAZOLINE HYDROCHLORIDE 2 SPRAY: 0.05 SPRAY NASAL at 10:53

## 2023-11-06 RX ADMIN — PROPOFOL 100 MCG/KG/MIN: 10 INJECTION, EMULSION INTRAVENOUS at 11:13

## 2023-11-06 RX ADMIN — LIDOCAINE HYDROCHLORIDE 5 ML: 10 INJECTION, SOLUTION EPIDURAL; INFILTRATION; INTRACAUDAL; PERINEURAL at 11:13

## 2023-11-06 RX ADMIN — SODIUM CHLORIDE, SODIUM LACTATE, POTASSIUM CHLORIDE, AND CALCIUM CHLORIDE 100 ML/HR: 600; 310; 30; 20 INJECTION, SOLUTION INTRAVENOUS at 10:52

## 2023-11-06 RX ADMIN — PROPOFOL 50 MG: 10 INJECTION, EMULSION INTRAVENOUS at 11:16

## 2023-11-06 SDOH — HEALTH STABILITY: MENTAL HEALTH: CURRENT SMOKER: 0

## 2023-11-06 ASSESSMENT — PAIN SCALES - GENERAL
PAINLEVEL_OUTOF10: 0 - NO PAIN
PAIN_LEVEL: 2
PAINLEVEL_OUTOF10: 0 - NO PAIN
PAINLEVEL_OUTOF10: 0 - NO PAIN

## 2023-11-06 ASSESSMENT — PAIN - FUNCTIONAL ASSESSMENT
PAIN_FUNCTIONAL_ASSESSMENT: 0-10

## 2023-11-06 NOTE — H&P
Signed       Expand All Collapse All    History Of Present Illness  Hank Rick is a 78 y.o. male presents for DISE     Past Medical History  He has a past medical history of COVID-19 (11/15/2022), Esophageal obstruction (05/15/2022), Essential tremor (12/29/2022), Olecranon bursitis, left elbow (04/26/2016), Personal history of diseases of the blood and blood-forming organs and certain disorders involving the immune mechanism (05/17/2022), Personal history of other diseases of the digestive system (11/13/2014), Personal history of other diseases of the digestive system (01/14/2021), Personal history of other specified conditions (11/29/2018), Rash and other nonspecific skin eruption (11/02/2021), and Salmonella enteritis.     Surgical History  He has a past surgical history that includes Other surgical history (01/07/2015); Tonsillectomy (11/18/2014); Refractive surgery (03/05/2014); Other surgical history (03/05/2014); MR angio head wo IV contrast (5/9/2023); MR angio neck wo IV contrast (5/9/2023); CT angio head w and wo IV contrast (5/12/2023); and CT angio neck w and wo IV contrast (5/12/2023).     Social History  He reports that he has never smoked. He has never used smokeless tobacco. He reports current alcohol use of about 4.0 standard drinks of alcohol per week. No history on file for drug use.     Family History  Family History          Family History   Problem Relation Name Age of Onset    Multiple sclerosis Mother        Colon cancer Father                Allergies  Pollen extracts and Gabapentin     Review of Symptoms:   Constitutional: Negative for chills, diaphoresis or fever  HENT: Negative for neck swelling  Eyes:.  Negative for eye pain  Respiratory:.  Negative for cough, shortness of breath or wheezing    Cardiovascular:.  Negative for chest pain or palpitations  Gastrointestinal:.  Negative for abdominal pain, nausea and vomiting  Genitourinary:.  Negative for urgency  Musculoskeletal:   Positive for back pain. Positive for joint pain. Denies falls within the past 3 months.  Skin: Negative for wounds or itching   Neurological: Negative for dizziness, seizures, loss of consciousness and weakness  Endo/Heme/Allergies: Does not bruise/bleed easily  Psychiatric/Behavioral: Negative for depression. The patient does not appear anxious.       PHYSICAL EXAM  Vitals signs reviewed  Constitutional:       General: Not in acute distress     Appearance: Normal appearance. Not ill-appearing.  HENT:     Head: Normocephalic and atraumatic  Eyes:     Conjunctiva/sclera: Conjunctivae normal  Cardiovascular:     Rate and Rhythm: Normal rate and regular rhythm  Pulmonary:     Effort: No respiratory distress  Abdominal:     Palpations: Abdomen is soft  Musculoskeletal: BRYAN  Skin:     General: Skin is warm and dry  Neurological:     General: No focal deficit present  Psychiatric:         Mood and Affect: Mood normal         Behavior: Behavior normal     Last Recorded Vitals  There were no vitals taken for this visit.     Relevant Results       Current Outpatient Medications   Medication Instructions    acetaminophen-codeine (Tylenol w/ Codeine #3) 300-30 mg tablet 1 tablet, oral, Every 6 hours    albuterol 90 mcg/actuation inhaler 2 puffs, inhalation, Every 4 to 6 hours as needed as directed    amitriptyline (Elavil) 10 mg tablet 1-2 tablets, oral, Nightly    amLODIPine (NORVASC) 2.5 mg, oral, Daily    atorvastatin (Lipitor) 40 mg tablet TAKE 1 TABLET BY MOUTH ONCE DAILY    atorvastatin (LIPITOR) 40 mg, oral, Daily    azelastine (Astelin) 137 mcg (0.1 %) nasal spray 2 sprays, Each Nostril, 2 times daily    benzonatate (Tessalon) 100 mg capsule TAKE 1 CAPSULE BY MOUTH THREE TIMES A DAY    benzonatate (TESSALON) 100 mg, oral, 3 times daily PRN, Do not crush or chew.    chlorhexidine (Hibiclens) 4 % external liquid Topical, Use as directed for preoperative shower    clopidogrel (Plavix) 75 mg tablet TAKE 1 TABLET BY MOUTH  ONCE DAILY END DATE (09/11/23)    clopidogrel (PLAVIX) 75 mg, oral, Daily    diclofenac sodium (Voltaren) 1 % gel gel Topical, 2 times daily PRN    docusate sodium (COLACE) 100 mg, oral, 2 times daily    fexofenadine (Allegra) 180 mg tablet 1 tablet, oral, Daily    fluocinonide (Lidex) 0.05 % ointment Topical, As needed    fluticasone (Flonase) 50 mcg/actuation nasal spray 1-2 sprays, Each Nostril, Daily    lidocaine (LMX) 4 % cream 1 Application, Topical, Daily    lidocaine 4 % patch APPLY 1 PATCH TOPICALLY TO AFFECTED AREA ONCE DAILY. LEAVE IN PLACE FOR 12 HOURS, THEN REMOVE AND KEEP OFF FOR 12 HOURS.    meloxicam (Mobic) 15 mg tablet take 1 tablet by mouth once daily if needed for MODERATE PAIN ( PAIN SCALE 4-6 )    memantine (Namenda) 5 mg tablet 1 po at bedtime x 4 weeks, then 1 po bid    methylPREDNISolone (Medrol) 4 mg tablet oral, Take as directed 21 therapy pack    montelukast (Singulair) 10 mg tablet 1 tablet, oral, Daily    mupirocin (Bactroban) 2 % ointment Topical, 2 times daily, 3 days before surgery    omeprazole (PriLOSEC) 20 mg DR capsule Take 1 capsule by mouth bid for 2 weeks then 1 capsule daily    omeprazole OTC (PriLOSEC OTC) 20 mg EC tablet 1 tablet, oral, Daily    oxyCODONE (ROXICODONE) 5 mg, oral, Every 4 hours PRN    oxyCODONE-acetaminophen (Percocet) 5-325 mg tablet 1 tablet, oral, Daily PRN    prednisoLONE acetate (Pred-Forte) 1 % ophthalmic suspension 1 drop, Right Eye, 4 times daily, Refer to prescription notes    propranolol (Inderal) 10 mg tablet 1 tablet, oral, 3 times daily    propranolol LA (INDERAL LA) 120 mg, oral, Daily before breakfast    propranolol LA (INDERAL LA) 60 mg, oral, Daily    sennosides (Senokot) 8.6 mg tablet 1 tablet, oral, Daily    sennosides (Senokot) 8.6 mg tablet TAKE 1 TABLET BY MOUTH TWO TIMES A DAY AS NEEDED FOR CONSTIPATION    sildenafil (Viagra) 100 mg tablet 0.5-1 tablets, oral, As needed    tiZANidine (ZANAFLEX) 4 mg, oral, Every 6 hours PRN     topiramate (Topamax) 25 mg tablet 1-2 tablets, oral, Nightly    traZODone (DESYREL) 50 mg, oral, As needed    vibegron (Gemtesa) 75 mg tablet 1 tablet, oral, Daily            Impression  OSAS    No image results found.        No diagnosis found.      ASSESSMENT/PLAN  Hank Rick is a 78 y.o. male WITH osas WHO PRESENTS FOR dise                    Revision History

## 2023-11-06 NOTE — ANESTHESIA POSTPROCEDURE EVALUATION
Patient: Hank Rick    Procedure Summary       Date: 11/06/23 Room / Location: [facility] OR 01 / [facility]    Anesthesia Start: 1110 Anesthesia Stop: 1136    Procedure: Drug induced sleep endoscopy (Nose) Diagnosis:       Obstructive sleep apnea      BMI 24.0-24.9, adult      (OBSTRUCTIVE SLEEP APNEA)    Surgeons: Ben Bob MD Responsible Provider: Reji Huerta MD    Anesthesia Type: MAC ASA Status: 2            Anesthesia Type: MAC    Vitals Value Taken Time   /76 11/06/23 1150   Temp 36.2 °C (97.2 °F) 11/06/23 1145   Pulse 48 11/06/23 1150   Resp 15 11/06/23 1150   SpO2 94 % 11/06/23 1150   Vitals shown include unvalidated device data.    Anesthesia Post Evaluation    Patient location during evaluation: PACU  Patient participation: complete - patient participated  Level of consciousness: sleepy but conscious  Pain score: 2  Pain management: adequate  Multimodal analgesia pain management approach  Airway patency: patent  Cardiovascular status: acceptable  Respiratory status: acceptable  Hydration status: acceptable    No notable events documented.

## 2023-11-06 NOTE — OP NOTE
Drug induced sleep endoscopy Operative Note          Date: 2023  OR Location: [facility]    Name: Hank Rick : 1945 Age: 78 y.o. MRN: [id number]  Sex: male      Diagnosis  Pre-op Diagnosis    Pre-Op Diagnosis Codes:     * Obstructive sleep apnea [G47.33]     * BMI 24.0-24.9, adult [Z68.24] Post-op Diagnosis     same     Procedures      * Drug induced sleep endoscopy     Surgeons      Ben Bob MD     Assistant:  See OR log    Procedure Summary  Anesthesia: General  Anesthesia Staff: Reij Huerta MD   Estimated Blood Loss: none      Findings: Anterior posterior closure of the soft palate/velum with little to no lateral wall collapse    Indications: Hank Rick is an 78 y.o.  male  who is having surgery for OBSTRUCTIVE SLEEP APNEA.  He has obstructive sleep apnea and is unable to tolerate CPAP.  Drug-induced sleep endoscopy was recommended to evaluate her airway for the level of obstruction    The patient was seen in the preoperative area. The risks, benefits, complications, treatment options, non-operative alternatives, expected recovery and outcomes were discussed with the patient. The possibilities of reaction to medication, pulmonary aspiration, injury to surrounding structures, bleeding, recurrent infection, the need for additional procedures, failure to diagnose a condition, and creating a complication requiring transfusion or operation were discussed with the patient. The patient concurred with the proposed plan, giving informed consent.  The site of surgery was properly noted/marked if necessary per policy. The patient has been actively warmed in preoperative area. Preoperative antibiotics are not indicated. Venous thrombosis prophylaxis are not indicated.    Procedure Details: The patient was brought to the operating room placed on the operating table in the supine position.  After blood pressure and cardiac monitors were applied the patient was placed under IV sedation with a propofol  protocol for drug-induced sleep endoscopy.  Once the patient was unconscious, snoring and showing evidence of apnea flexible nasal endoscopy was carried out.  The velum closed in an anterior posterior direction with no significant lateral wall collapse.  The base of tongue was noted to be enlarged.  The epiglottis was normal.  The larynx appeared normal.  Complications:  None; patient tolerated the procedure well.    Disposition: PACU - hemodynamically stable.  Condition: stable         Additional Details: Favorable anatomy to consider inspire    Attending Attestation: I performed the procedure.    Ben Bob  Phone Number: [phone]

## 2023-11-06 NOTE — ANESTHESIA PREPROCEDURE EVALUATION
Patient: Hank Rick    Procedure Information       Date/Time: 11/06/23 1145    Procedure: Drug induced sleep endoscopy    Location: [facility] / [facility]    Surgeons: Ben Bob MD            Relevant Problems   Cardiovascular   (+) Abnormal EKG   (+) Hyperlipidemia   (+) Primary hypertension   (+) Thoracic back pain      Endocrine   (+) Hyponatremia      GI   (+) Esophageal stricture   (+) GERD (gastroesophageal reflux disease)      Neuro/Psych   (+) Cerebrovascular accident (CVA) (CMS/HCC)   (+) Cervical radiculitis   (+) Cubital tunnel syndrome, right   (+) Lumbar radiculopathy   (+) Mixed Alzheimer's and vascular dementia (CMS/HCC)      Pulmonary   (+) Asthma   (+) WALKER (obstructive sleep apnea)      Hematology   (+) Anemia, unspecified      Musculoskeletal   (+) Degenerative lumbar spinal stenosis   (+) Lumbar canal stenosis   (+) Lumbar scoliosis   (+) Lumbar stenosis with neurogenic claudication   (+) Myofascial pain syndrome, cervical   (+) Primary osteoarthritis of both knees   (+) Primary osteoarthritis of left knee   (+) Primary osteoarthritis of right knee      Eyes, Ears, Nose, and Throat   (+) [place] (hard of hearing)      Infectious Disease   (+) Erythrasma       Clinical information reviewed:   Tobacco  Allergies  Meds   Med Hx  Surg Hx   Fam Hx  Soc Hx        NPO Detail:  NPO/Void Status  Date of Last Liquid: 11/06/23  Time of Last Liquid: 0730  Date of Last Solid: 11/05/23  Time of Last Solid: 2200  Last Intake Type: Light meal  Time of Last Void: 1000         Physical Exam    Airway  Mallampati: II  TM distance: >3 FB  Neck ROM: full     Cardiovascular - normal exam     Dental    Pulmonary - normal exam     Abdominal - normal exam             Anesthesia Plan    ASA 2     general     The patient is not a current smoker.  Patient was not previously instructed to abstain from smoking on day of procedure.  Patient did not smoke on day of procedure.  Education provided regarding risk of  obstructive sleep apnea.  intravenous induction   Postoperative administration of opioids is intended.  Trial extubation is planned.  Anesthetic plan and risks discussed with patient.  Use of blood products discussed with patient who consented to blood products.    Plan discussed with CAA, attending and CRNA.

## 2023-11-07 ENCOUNTER — APPOINTMENT (OUTPATIENT)
Dept: PHYSICAL THERAPY | Facility: CLINIC | Age: 78
End: 2023-11-07
Payer: MEDICARE

## 2023-11-08 ENCOUNTER — APPOINTMENT (OUTPATIENT)
Dept: SPEECH THERAPY | Facility: CLINIC | Age: 78
End: 2023-11-08
Payer: MEDICARE

## 2023-11-08 ENCOUNTER — OFFICE VISIT (OUTPATIENT)
Dept: PRIMARY CARE | Facility: CLINIC | Age: 78
End: 2023-11-08
Payer: MEDICARE

## 2023-11-08 VITALS
OXYGEN SATURATION: 96 % | BODY MASS INDEX: 25.39 KG/M2 | WEIGHT: 192.4 LBS | HEART RATE: 54 BPM | SYSTOLIC BLOOD PRESSURE: 140 MMHG | DIASTOLIC BLOOD PRESSURE: 80 MMHG

## 2023-11-08 DIAGNOSIS — I10 HYPERTENSION, UNSPECIFIED TYPE: ICD-10-CM

## 2023-11-08 DIAGNOSIS — M96.1 LUMBAR POST-LAMINECTOMY SYNDROME: Primary | Chronic | ICD-10-CM

## 2023-11-08 DIAGNOSIS — M17.11 PRIMARY OSTEOARTHRITIS OF RIGHT KNEE: ICD-10-CM

## 2023-11-08 DIAGNOSIS — F01.50 MIXED ALZHEIMER'S AND VASCULAR DEMENTIA (MULTI): ICD-10-CM

## 2023-11-08 DIAGNOSIS — F02.80 MIXED ALZHEIMER'S AND VASCULAR DEMENTIA (MULTI): ICD-10-CM

## 2023-11-08 DIAGNOSIS — G30.9 MIXED ALZHEIMER'S AND VASCULAR DEMENTIA (MULTI): ICD-10-CM

## 2023-11-08 DIAGNOSIS — D69.2 OTHER NONTHROMBOCYTOPENIC PURPURA (CMS-HCC): ICD-10-CM

## 2023-11-08 DIAGNOSIS — M54.42 CHRONIC LEFT-SIDED LOW BACK PAIN WITH LEFT-SIDED SCIATICA: ICD-10-CM

## 2023-11-08 DIAGNOSIS — G89.29 CHRONIC LEFT-SIDED LOW BACK PAIN WITH LEFT-SIDED SCIATICA: ICD-10-CM

## 2023-11-08 DIAGNOSIS — M17.12 PRIMARY OSTEOARTHRITIS OF LEFT KNEE: ICD-10-CM

## 2023-11-08 PROCEDURE — 20610 DRAIN/INJ JOINT/BURSA W/O US: CPT | Performed by: INTERNAL MEDICINE

## 2023-11-08 PROCEDURE — 99215 OFFICE O/P EST HI 40 MIN: CPT | Performed by: INTERNAL MEDICINE

## 2023-11-08 PROCEDURE — 1125F AMNT PAIN NOTED PAIN PRSNT: CPT | Performed by: INTERNAL MEDICINE

## 2023-11-08 PROCEDURE — 3079F DIAST BP 80-89 MM HG: CPT | Performed by: INTERNAL MEDICINE

## 2023-11-08 PROCEDURE — 1036F TOBACCO NON-USER: CPT | Performed by: INTERNAL MEDICINE

## 2023-11-08 PROCEDURE — 3077F SYST BP >= 140 MM HG: CPT | Performed by: INTERNAL MEDICINE

## 2023-11-08 PROCEDURE — 1160F RVW MEDS BY RX/DR IN RCRD: CPT | Performed by: INTERNAL MEDICINE

## 2023-11-08 PROCEDURE — 1159F MED LIST DOCD IN RCRD: CPT | Performed by: INTERNAL MEDICINE

## 2023-11-08 RX ORDER — TRIAMCINOLONE ACETONIDE 40 MG/ML
40 INJECTION, SUSPENSION INTRA-ARTICULAR; INTRAMUSCULAR ONCE
Status: COMPLETED | OUTPATIENT
Start: 2023-11-08 | End: 2023-11-08

## 2023-11-08 RX ORDER — TRAMADOL HYDROCHLORIDE 50 MG/1
50 TABLET ORAL EVERY 8 HOURS PRN
Qty: 21 TABLET | Refills: 0 | Status: SHIPPED | OUTPATIENT
Start: 2023-11-08 | End: 2023-11-15

## 2023-11-08 RX ORDER — AMLODIPINE BESYLATE 2.5 MG/1
TABLET ORAL
Qty: 180 TABLET | Refills: 1 | Status: SHIPPED | OUTPATIENT
Start: 2023-11-08 | End: 2024-05-01

## 2023-11-08 RX ADMIN — TRIAMCINOLONE ACETONIDE 40 MG: 40 INJECTION, SUSPENSION INTRA-ARTICULAR; INTRAMUSCULAR at 17:06

## 2023-11-08 RX ADMIN — TRIAMCINOLONE ACETONIDE 40 MG: 40 INJECTION, SUSPENSION INTRA-ARTICULAR; INTRAMUSCULAR at 17:04

## 2023-11-08 ASSESSMENT — ENCOUNTER SYMPTOMS
BACK PAIN: 1
CONSTITUTIONAL NEGATIVE: 1

## 2023-11-08 ASSESSMENT — PATIENT HEALTH QUESTIONNAIRE - PHQ9
1. LITTLE INTEREST OR PLEASURE IN DOING THINGS: NOT AT ALL
2. FEELING DOWN, DEPRESSED OR HOPELESS: NOT AT ALL
1. LITTLE INTEREST OR PLEASURE IN DOING THINGS: NOT AT ALL
SUM OF ALL RESPONSES TO PHQ9 QUESTIONS 1 AND 2: 0
2. FEELING DOWN, DEPRESSED OR HOPELESS: NOT AT ALL
SUM OF ALL RESPONSES TO PHQ9 QUESTIONS 1 AND 2: 0

## 2023-11-08 NOTE — PROGRESS NOTES
Patient ID: Hank Rick is a 78 y.o. male who presents for Knee Pain (Both knees, wants cortisone injections) and Back Pain (Discuss pain med options).    /80   Pulse 54   Wt 87.3 kg (192 lb 6.4 oz)   SpO2 96%   BMI 25.39 kg/m²     HPI      Patient is here for chronic low back pain, pain scale is 6-7 out of 10  It radiates to the left and to the left leg, multilevels lumbar spinal stenosis  From recent MRI, he has seen Dr. Shad Sanon, recommended conservative treatment and, do not suggest any revision surgery    Patient would like to try tramadol for pain and would like to get prescription for back      He is also here for bilateral knee pain, he has osteoarthritis in both knees  He had a cortisone shot in both knees more than 4 months ago would like to have this done today      Patient has mixed Alzheimer's and vascular dementia  On Namenda 5 mg 1 pill twice daily      Patient has nonthrombocytopenic purpura  Which has resolved      Subjective     Review of Systems   Constitutional: Negative.    Musculoskeletal:  Positive for back pain.        More in the lower back  Pain scale could be 6-7 out of 10  No extremity weakness      Patient has pain in both knees  Hurts to move  Pain scale is 6-7 out of 10   All other systems reviewed and are negative.      Objective     Physical Exam  Vitals and nursing note reviewed.   Cardiovascular:      Rate and Rhythm: Normal rate and regular rhythm.      Pulses: Normal pulses.      Heart sounds: Normal heart sounds. No murmur heard.  Pulmonary:      Effort: Pulmonary effort is normal.      Breath sounds: Normal breath sounds.   Musculoskeletal:      Right lower leg: No edema.      Left lower leg: No edema.      Comments: Lumbar flexion extension is normal  No lumbar spine tenderness  There is significant paralumbar stiffness spasm noted        Both knees range of motion is painful  Course crepitation noted  No soft tissue swelling         Lab Results    Component Value Date    WBC 8.7 08/24/2023    HGB 12.5 (L) 08/24/2023    HCT 36.9 (L) 08/24/2023    MCV 94 08/24/2023     08/24/2023           Problem List Items Addressed This Visit       Primary osteoarthritis of left knee    Mixed Alzheimer's and vascular dementia (CMS/HCC)     STABLE ON NEMANDA          Primary osteoarthritis of right knee    Other nonthrombocytopenic purpura (CMS/HCC)     STABLE , NO BLEEDING , NO BRUISING           Other Visit Diagnoses       Lumbar post-laminectomy syndrome  (Chronic)   -  Primary    Relevant Medications    traMADol (Ultram) 50 mg tablet    Chronic left-sided low back pain with left-sided sciatica        Relevant Medications    traMADol (Ultram) 50 mg tablet    Hypertension, unspecified type        Relevant Medications    amLODIPine (Norvasc) 2.5 mg tablet                 A/P       Patient ID: Hank Rick is a 78 y.o. male.    Joint Injection Large/Arthrocentesis: bilateral knee (Both knees) on 11/8/2023 4:59 PM  Indications: pain (Bilateral knee pain)  Details: 22 G needle, anterolateral (Both knees) approach (guidance: With clinical examination)  Medications (Right): (With patient's consent, and after septic precautions and sterilizing the skin of both knees with alcohol swab x4,, and spraying with topical anesthetic adequately in the skin of both knees, injection Kenalog 40 mg+ 1 cc 1% lidocaine injected into each knees patient tolerated the procedure very well no complications noted and pain is immediately relieved)  Outcome: tolerated well, no immediate complications      [facility] reviewed no red flag   Tramadol 50 mg 1 pill 3 times a day for 7 days quantity 21 no refill  Also advised him to take Tylenol 325 mg 1 pill 3 times a day for pain

## 2023-11-15 ENCOUNTER — PREP FOR PROCEDURE (OUTPATIENT)
Dept: OTOLARYNGOLOGY | Facility: HOSPITAL | Age: 78
End: 2023-11-15

## 2023-11-15 ENCOUNTER — APPOINTMENT (OUTPATIENT)
Dept: SPEECH THERAPY | Facility: CLINIC | Age: 78
End: 2023-11-15
Payer: MEDICARE

## 2023-11-15 ENCOUNTER — OFFICE VISIT (OUTPATIENT)
Dept: OTOLARYNGOLOGY | Facility: CLINIC | Age: 78
End: 2023-11-15
Payer: MEDICARE

## 2023-11-15 VITALS — WEIGHT: 188 LBS | TEMPERATURE: 97.3 F | BODY MASS INDEX: 24.92 KG/M2 | HEIGHT: 73 IN

## 2023-11-15 DIAGNOSIS — G47.33 OBSTRUCTIVE SLEEP APNEA: Primary | ICD-10-CM

## 2023-11-15 PROCEDURE — 1036F TOBACCO NON-USER: CPT | Performed by: OTOLARYNGOLOGY

## 2023-11-15 PROCEDURE — 1125F AMNT PAIN NOTED PAIN PRSNT: CPT | Performed by: OTOLARYNGOLOGY

## 2023-11-15 PROCEDURE — 99214 OFFICE O/P EST MOD 30 MIN: CPT | Performed by: OTOLARYNGOLOGY

## 2023-11-15 PROCEDURE — 1160F RVW MEDS BY RX/DR IN RCRD: CPT | Performed by: OTOLARYNGOLOGY

## 2023-11-15 PROCEDURE — 1159F MED LIST DOCD IN RCRD: CPT | Performed by: OTOLARYNGOLOGY

## 2023-11-15 RX ORDER — SODIUM CHLORIDE, SODIUM LACTATE, POTASSIUM CHLORIDE, CALCIUM CHLORIDE 600; 310; 30; 20 MG/100ML; MG/100ML; MG/100ML; MG/100ML
100 INJECTION, SOLUTION INTRAVENOUS CONTINUOUS
Status: CANCELLED | OUTPATIENT
Start: 2024-01-08

## 2023-11-15 RX ORDER — CEFAZOLIN SODIUM 2 G/100ML
2 INJECTION, SOLUTION INTRAVENOUS ONCE
Status: CANCELLED | OUTPATIENT
Start: 2024-01-08 | End: 2023-11-15

## 2023-11-15 NOTE — LETTER
November 15, 2023     Stan Lopez MD  [address] [facility] [address]  [facility] [address]    Patient: Hank Rick   YOB: 1945   Date of Visit: 11/15/2023       Dear Dr. Stan Lopez MD:    Thank you for referring Hank Rick to me for evaluation. Below are my notes for this consultation.  If you have questions, please do not hesitate to call me. I look forward to following your patient along with you.       Sincerely,     Ben Bob MD      CC: No Recipients  ______________________________________________________________________________________    Chief Complaint   Patient presents with   • Follow-up     F/U DISE, DISCUSS INSPIRE      HPI  He returns status post drug-induced sleep endoscopy.  His anatomy was favorable for inspire.  He would like to proceed with inspire  Hank Rick is a 78 y.o. male seen in consultation at the request of Dr. Lopez for evaluation of obstructive sleep apnea. He has a sleep study that shows an apnea-hypopnea index (4%) of 25.4. He has snoring daytime tiredness and a restless sleep pattern. He tried CPAP for several months and was unable to tolerate it. He tried different masks. He is interested in alternative therapies       Past Medical History:   Diagnosis Date   • Cerebral vascular accident (CMS/HCC) 05/09/2023   • COVID-19 11/15/2022    COVID-19 virus infection   • Esophageal obstruction 05/15/2022    Esophageal stricture   • Essential tremor 12/29/2022    Essential tremor   • GERD (gastroesophageal reflux disease)    • Hard of hearing    • Memory impairment    • Olecranon bursitis, left elbow 04/26/2016    Olecranon bursitis of left elbow   • Personal history of diseases of the blood and blood-forming organs and certain disorders involving the immune mechanism 05/17/2022    History of anemia   • Personal history of other diseases of the digestive system 11/13/2014    History of gastroesophageal reflux (GERD)   • Personal history of other diseases of the  digestive system 01/14/2021    History of hiatal hernia   • Personal history of other specified conditions 11/29/2018    History of urinary frequency   • Rash and other nonspecific skin eruption 11/02/2021    Skin rash   • Salmonella enteritis     Salmonella enteritis   • TIA (transient ischemic attack)    • Vertebral artery stenosis             Medications:     Current Outpatient Medications:   •  albuterol 90 mcg/actuation inhaler, Inhale 2 puffs. Every 4 to 6 hours as needed as directed, Disp: , Rfl:   •  amitriptyline (Elavil) 10 mg tablet, Take 1-2 tablets (10-20 mg) by mouth once daily at bedtime., Disp: , Rfl:   •  amLODIPine (Norvasc) 2.5 mg tablet, Take  2 tablets every day , please note change in dose, Disp: 180 tablet, Rfl: 1  •  atorvastatin (Lipitor) 40 mg tablet, Take 1 tablet (40 mg) by mouth once daily., Disp: 90 tablet, Rfl: 2  •  azelastine (Astelin) 137 mcg (0.1 %) nasal spray, Administer 2 sprays into each nostril 2 times a day., Disp: 30 mL, Rfl: 5  •  clopidogrel (Plavix) 75 mg tablet, Take 1 tablet (75 mg) by mouth once daily., Disp: 90 tablet, Rfl: 2  •  diclofenac sodium (Voltaren) 1 % gel gel, Apply topically 2 times a day as needed., Disp: , Rfl:   •  fexofenadine (Allegra) 180 mg tablet, Take 1 tablet (180 mg) by mouth once daily., Disp: , Rfl:   •  fluticasone (Flonase) 50 mcg/actuation nasal spray, Administer 1-2 sprays into each nostril once daily., Disp: , Rfl:   •  lidocaine 4 % patch, APPLY 1 PATCH TOPICALLY TO AFFECTED AREA ONCE DAILY. LEAVE IN PLACE FOR 12 HOURS, THEN REMOVE AND KEEP OFF FOR 12 HOURS., Disp: 15 patch, Rfl: 0  •  meloxicam (Mobic) 15 mg tablet, take 1 tablet by mouth once daily if needed for MODERATE PAIN ( PAIN SCALE 4-6 ), Disp: 30 tablet, Rfl: 1  •  memantine (Namenda) 5 mg tablet, 1 po at bedtime x 4 weeks, then 1 po bid, Disp: , Rfl:   •  montelukast (Singulair) 10 mg tablet, Take 1 tablet (10 mg) by mouth once daily., Disp: , Rfl:   •  omeprazole OTC  "(PriLOSEC OTC) 20 mg EC tablet, Take 1 tablet (20 mg) by mouth once daily., Disp: , Rfl:   •  oxyCODONE ER (OxyCONTIN) 15 mg 12 hr tablet, 1 tablet (15 mg) every 12 hours. Do not crush, chew, or split., Disp: , Rfl:   •  propranolol (Inderal) 10 mg tablet, Take 1 tablet (10 mg) by mouth 3 times a day., Disp: , Rfl:   •  propranolol LA (Inderal LA) 60 mg 24 hr capsule, Take 1 capsule (60 mg) by mouth once daily., Disp: , Rfl:   •  sildenafil (Viagra) 100 mg tablet, Take 0.5-1 tablets ( mg) by mouth if needed for erectile dysfunction (1 hour prior to sexual activity (max 1 per day))., Disp: 30 tablet, Rfl: 6  •  tiZANidine (Zanaflex) 4 mg tablet, Take 1 tablet (4 mg) by mouth every 6 hours if needed for muscle spasms., Disp: , Rfl:   •  topiramate (Topamax) 25 mg tablet, Take 1-2 tablets (25-50 mg) by mouth once daily at bedtime., Disp: , Rfl:   •  traMADol (Ultram) 50 mg tablet, Take 1 tablet (50 mg) by mouth every 8 hours if needed for severe pain (7 - 10) (AS NEEDED FOR PAIN) for up to 7 days., Disp: 21 tablet, Rfl: 0  •  traZODone (Desyrel) 50 mg tablet, Take 1 tablet (50 mg) by mouth if needed., Disp: , Rfl:      Allergies:  Allergies   Allergen Reactions   • Pollen Extracts Other   • Gabapentin Anxiety        Physical Exam:  Last Recorded Vitals  Temperature 36.3 °C (97.3 °F), height 1.854 m (6' 1\"), weight 85.3 kg (188 lb).  []General appearance: Well-developed, well-nourished in no acute distress, conversant with normal voice quality    Head/face: No erythema or edema or facial tenderness, and normal facial nerve function bilaterally    External ear: Clear external auditory canals with normal pinnae  Tube status: N/A  Middle ear: Tympanic membranes intact and mobile, middle ears normal.  Tympanic membrane perforation: N/A  Mastoid bowl: N/A  Hearing: Normal conversational awareness at normal speech thresholds    Nose visualized using: Anterior rhinoscopy  Nasal dorsum: Nontraumatic midline " appearance  Septum: Midline, nonobstructing  Inferior turbinates: Normal, pink  Secretions: Dry    Oral cavity and oropharynx: Normal  Teeth: Good condition  Floor of mouth: without lesions  Palate: Normal hard palate, soft palate and uvula  Oropharynx: Clear, no lesions present  Buccal mucosa: Normal without masses or lesions  Lips: Normal    Nasopharynx: Inadequate mirror exam secondary to gag/anatomy    Neck:  Salivary glands: Normal bilateral parotid and submandibular glands by inspection and palpation.  Non-thyroid masses: No palpable masses or significant lymphadenopathy  Trachea: Midline  Thyroid: No thyromegaly or palpable nodules  Temporomandibular joint: Nontender  Cervical range of motion: Normal    Neurologic exam: Alert and oriented x3, appropriate affect.  Cranial nerves II-XII normal bilaterally  Extraocular movement: Extraocular movement intact, normal gaze alignment        Problem List Items Addressed This Visit    None  Visit Diagnoses         Codes    Obstructive sleep apnea    -  Primary G47.33            PLAN  I have recommended proceeding with implantation of hypoglossal nerve stimulator (inspire) and chest wall respiratory sensor lead.  I discussed the surgery in great detail including the risks of bleeding infection pneumothorax hypoglossal nerve injury persistent apnea battery life MRI restrictions etc.  The patient understands the risks and benefits and would like to proceed with scheduling.    Ben Bob MD

## 2023-11-15 NOTE — PROGRESS NOTES
Chief Complaint   Patient presents with    Follow-up     F/U DISE, DISCUSS INSPIRE      HPI  He returns status post drug-induced sleep endoscopy.  His anatomy was favorable for inspire.  He would like to proceed with inspire  Hank Rick is a 78 y.o. male seen in consultation at the request of Dr. Lopez for evaluation of obstructive sleep apnea. He has a sleep study that shows an apnea-hypopnea index (4%) of 25.4. He has snoring daytime tiredness and a restless sleep pattern. He tried CPAP for several months and was unable to tolerate it. He tried different masks. He is interested in alternative therapies       Past Medical History:   Diagnosis Date    Cerebral vascular accident (CMS/HCC) 05/09/2023    COVID-19 11/15/2022    COVID-19 virus infection    Esophageal obstruction 05/15/2022    Esophageal stricture    Essential tremor 12/29/2022    Essential tremor    GERD (gastroesophageal reflux disease)     Hard of hearing     Memory impairment     Olecranon bursitis, left elbow 04/26/2016    Olecranon bursitis of left elbow    Personal history of diseases of the blood and blood-forming organs and certain disorders involving the immune mechanism 05/17/2022    History of anemia    Personal history of other diseases of the digestive system 11/13/2014    History of gastroesophageal reflux (GERD)    Personal history of other diseases of the digestive system 01/14/2021    History of hiatal hernia    Personal history of other specified conditions 11/29/2018    History of urinary frequency    Rash and other nonspecific skin eruption 11/02/2021    Skin rash    Salmonella enteritis     Salmonella enteritis    TIA (transient ischemic attack)     Vertebral artery stenosis             Medications:     Current Outpatient Medications:     albuterol 90 mcg/actuation inhaler, Inhale 2 puffs. Every 4 to 6 hours as needed as directed, Disp: , Rfl:     amitriptyline (Elavil) 10 mg tablet, Take 1-2 tablets (10-20 mg) by mouth once daily  at bedtime., Disp: , Rfl:     amLODIPine (Norvasc) 2.5 mg tablet, Take  2 tablets every day , please note change in dose, Disp: 180 tablet, Rfl: 1    atorvastatin (Lipitor) 40 mg tablet, Take 1 tablet (40 mg) by mouth once daily., Disp: 90 tablet, Rfl: 2    azelastine (Astelin) 137 mcg (0.1 %) nasal spray, Administer 2 sprays into each nostril 2 times a day., Disp: 30 mL, Rfl: 5    clopidogrel (Plavix) 75 mg tablet, Take 1 tablet (75 mg) by mouth once daily., Disp: 90 tablet, Rfl: 2    diclofenac sodium (Voltaren) 1 % gel gel, Apply topically 2 times a day as needed., Disp: , Rfl:     fexofenadine (Allegra) 180 mg tablet, Take 1 tablet (180 mg) by mouth once daily., Disp: , Rfl:     fluticasone (Flonase) 50 mcg/actuation nasal spray, Administer 1-2 sprays into each nostril once daily., Disp: , Rfl:     lidocaine 4 % patch, APPLY 1 PATCH TOPICALLY TO AFFECTED AREA ONCE DAILY. LEAVE IN PLACE FOR 12 HOURS, THEN REMOVE AND KEEP OFF FOR 12 HOURS., Disp: 15 patch, Rfl: 0    meloxicam (Mobic) 15 mg tablet, take 1 tablet by mouth once daily if needed for MODERATE PAIN ( PAIN SCALE 4-6 ), Disp: 30 tablet, Rfl: 1    memantine (Namenda) 5 mg tablet, 1 po at bedtime x 4 weeks, then 1 po bid, Disp: , Rfl:     montelukast (Singulair) 10 mg tablet, Take 1 tablet (10 mg) by mouth once daily., Disp: , Rfl:     omeprazole OTC (PriLOSEC OTC) 20 mg EC tablet, Take 1 tablet (20 mg) by mouth once daily., Disp: , Rfl:     oxyCODONE ER (OxyCONTIN) 15 mg 12 hr tablet, 1 tablet (15 mg) every 12 hours. Do not crush, chew, or split., Disp: , Rfl:     propranolol (Inderal) 10 mg tablet, Take 1 tablet (10 mg) by mouth 3 times a day., Disp: , Rfl:     propranolol LA (Inderal LA) 60 mg 24 hr capsule, Take 1 capsule (60 mg) by mouth once daily., Disp: , Rfl:     sildenafil (Viagra) 100 mg tablet, Take 0.5-1 tablets ( mg) by mouth if needed for erectile dysfunction (1 hour prior to sexual activity (max 1 per day))., Disp: 30 tablet, Rfl: 6     "tiZANidine (Zanaflex) 4 mg tablet, Take 1 tablet (4 mg) by mouth every 6 hours if needed for muscle spasms., Disp: , Rfl:     topiramate (Topamax) 25 mg tablet, Take 1-2 tablets (25-50 mg) by mouth once daily at bedtime., Disp: , Rfl:     traMADol (Ultram) 50 mg tablet, Take 1 tablet (50 mg) by mouth every 8 hours if needed for severe pain (7 - 10) (AS NEEDED FOR PAIN) for up to 7 days., Disp: 21 tablet, Rfl: 0    traZODone (Desyrel) 50 mg tablet, Take 1 tablet (50 mg) by mouth if needed., Disp: , Rfl:      Allergies:  Allergies   Allergen Reactions    Pollen Extracts Other    Gabapentin Anxiety        Physical Exam:  Last Recorded Vitals  Temperature 36.3 °C (97.3 °F), height 1.854 m (6' 1\"), weight 85.3 kg (188 lb).  []General appearance: Well-developed, well-nourished in no acute distress, conversant with normal voice quality    Head/face: No erythema or edema or facial tenderness, and normal facial nerve function bilaterally    External ear: Clear external auditory canals with normal pinnae  Tube status: N/A  Middle ear: Tympanic membranes intact and mobile, middle ears normal.  Tympanic membrane perforation: N/A  Mastoid bowl: N/A  Hearing: Normal conversational awareness at normal speech thresholds    Nose visualized using: Anterior rhinoscopy  Nasal dorsum: Nontraumatic midline appearance  Septum: Midline, nonobstructing  Inferior turbinates: Normal, pink  Secretions: Dry    Oral cavity and oropharynx: Normal  Teeth: Good condition  Floor of mouth: without lesions  Palate: Normal hard palate, soft palate and uvula  Oropharynx: Clear, no lesions present  Buccal mucosa: Normal without masses or lesions  Lips: Normal    Nasopharynx: Inadequate mirror exam secondary to gag/anatomy    Neck:  Salivary glands: Normal bilateral parotid and submandibular glands by inspection and palpation.  Non-thyroid masses: No palpable masses or significant lymphadenopathy  Trachea: Midline  Thyroid: No thyromegaly or palpable " nodules  Temporomandibular joint: Nontender  Cervical range of motion: Normal    Neurologic exam: Alert and oriented x3, appropriate affect.  Cranial nerves II-XII normal bilaterally  Extraocular movement: Extraocular movement intact, normal gaze alignment        Problem List Items Addressed This Visit    None  Visit Diagnoses         Codes    Obstructive sleep apnea    -  Primary G47.33            PLAN  I have recommended proceeding with implantation of hypoglossal nerve stimulator (inspire) and chest wall respiratory sensor lead.  I discussed the surgery in great detail including the risks of bleeding infection pneumothorax hypoglossal nerve injury persistent apnea battery life MRI restrictions etc.  The patient understands the risks and benefits and would like to proceed with scheduling.    Ben Bob MD

## 2023-11-16 ENCOUNTER — TREATMENT (OUTPATIENT)
Dept: PHYSICAL THERAPY | Facility: CLINIC | Age: 78
End: 2023-11-16
Payer: MEDICARE

## 2023-11-16 DIAGNOSIS — M54.9 BACK PAIN WITH RADIATION: Primary | ICD-10-CM

## 2023-11-16 PROCEDURE — 97113 AQUATIC THERAPY/EXERCISES: CPT | Mod: GP

## 2023-11-16 NOTE — PROGRESS NOTES
Physical Therapy  Physical Therapy Treatment    Patient Name: Hank Rick  MRN: [id number]  Today's Date: 11/16/2023  Referred by: [place]  Insurance: Medicare; $0 co-pay  Visit Limit: MN  Visit: 5  Time Calculation  Start Time: 0830  Stop Time: 0930  Time Calculation (min): 60 min        Current Problem  1. Back pain with radiation               Pain: 7/10       Subjective:   Subjective   Patient reports that he has been doing okay. Notes that he was able to walk 1.5 miles recently but was sore afterward.     Objective:   Objective   Observation: mild forward trunk flexion      Treatments:   Exercise Sets/Reps Comments   Backward walking 5' 4 foot depth   Lateral walking 5'    Forward walking 5'    Dynamic marching 5'    Dynamic squats 5'    Floating  5' 7 foot depth w/ 1 white noodle   scissors 5'    bicycle 10'    Hip abd 5'    Boogie board paloff press 5'        Assessment: Performed final session of aquatic therapy trial prior to transitioning back to land for reassessment and continuation of treatment. Pt performed exercises with min cueing, demonstrating adequate understanding of exercises, within four and seven foot depth. Exercises continued to focus on improving core, lower extremity strength within pain tolerance to improve participation in functional, recreational activities.        Plan: Transition to land based therapy to continue to improve pain and overall functional ability.       Bruno Walters, PT

## 2023-11-17 ENCOUNTER — PREP FOR PROCEDURE (OUTPATIENT)
Dept: OTOLARYNGOLOGY | Facility: HOSPITAL | Age: 78
End: 2023-11-17

## 2023-11-17 ENCOUNTER — PROCEDURE VISIT (OUTPATIENT)
Dept: UROLOGY | Facility: CLINIC | Age: 78
End: 2023-11-17
Payer: MEDICARE

## 2023-11-17 VITALS
HEIGHT: 73 IN | WEIGHT: 185.2 LBS | SYSTOLIC BLOOD PRESSURE: 144 MMHG | DIASTOLIC BLOOD PRESSURE: 74 MMHG | HEART RATE: 94 BPM | BODY MASS INDEX: 24.55 KG/M2 | TEMPERATURE: 96.4 F

## 2023-11-17 DIAGNOSIS — N39.41 URGE INCONTINENCE OF URINE: ICD-10-CM

## 2023-11-17 DIAGNOSIS — Z01.818 PREOP TESTING: ICD-10-CM

## 2023-11-17 DIAGNOSIS — N40.1 BENIGN PROSTATIC HYPERPLASIA (BPH) WITH URINARY URGENCY: Primary | ICD-10-CM

## 2023-11-17 DIAGNOSIS — R39.15 BENIGN PROSTATIC HYPERPLASIA (BPH) WITH URINARY URGENCY: Primary | ICD-10-CM

## 2023-11-17 DIAGNOSIS — N32.89 OTHER SPECIFIED DISORDERS OF BLADDER: ICD-10-CM

## 2023-11-17 LAB
POC APPEARANCE, URINE: CLEAR
POC BILIRUBIN, URINE: ABNORMAL
POC BLOOD, URINE: NEGATIVE
POC COLOR, URINE: YELLOW
POC GLUCOSE, URINE: NEGATIVE MG/DL
POC KETONES, URINE: ABNORMAL MG/DL
POC LEUKOCYTES, URINE: NEGATIVE
POC NITRITE,URINE: NEGATIVE
POC PH, URINE: 5.5 PH
POC PROTEIN, URINE: NEGATIVE MG/DL
POC SPECIFIC GRAVITY, URINE: 1.02
POC UROBILINOGEN, URINE: 1 EU/DL

## 2023-11-17 PROCEDURE — 99214 OFFICE O/P EST MOD 30 MIN: CPT | Performed by: UROLOGY

## 2023-11-17 PROCEDURE — 81003 URINALYSIS AUTO W/O SCOPE: CPT | Performed by: UROLOGY

## 2023-11-17 PROCEDURE — 52000 CYSTOURETHROSCOPY: CPT | Performed by: UROLOGY

## 2023-11-17 RX ORDER — SODIUM CHLORIDE, SODIUM LACTATE, POTASSIUM CHLORIDE, CALCIUM CHLORIDE 600; 310; 30; 20 MG/100ML; MG/100ML; MG/100ML; MG/100ML
100 INJECTION, SOLUTION INTRAVENOUS CONTINUOUS
Status: CANCELLED | OUTPATIENT
Start: 2023-11-17

## 2023-11-17 ASSESSMENT — PAIN SCALES - GENERAL: PAINLEVEL: 0-NO PAIN

## 2023-11-17 NOTE — PROGRESS NOTES
Patient is a 78 y.o., male presenting for cystoscopy.  History of WALKER (on CPAP), BPH s/p HoLEP with Dr. Ohara (April 2022).   C/o persistent OAB symptoms including UUI post-operatively.   Failed oxybutynin, detrol LA, vesicare, myrbetriq, and trospium.     SUBJECTIVE: HPI   TODAY (11/17/23)    Cystoscopy performed: 11/17/23  Hank Rick identified using two (2) forms of identification.  Procedure: diagnostic cystourethroscopy  Indications for procedure: LUTS  Risks, benefits, and alternatives were discussed in detail.   Patient appears to understand and agrees to proceed.   Patient has signed the procedure consent form.     Cystoscopy findings:  Urethra: normal course and caliber, no evidence of stricture or lesion.  Prostate: No significnat residual adenoma.  Bladder: normal capacity, +trabeculations, no diverticulum, no stone, tumors.  Significantly elevated BNC on the left.     Post-cystoscopy: Patient tolerated procedure without complications.    TO REVIEW: Initial evaluation (10/31/23)  History of BPH s/p HoLEP with Dr. Ohara in April 2022. He reports he has still having bothersome urinary urgency and nocturia. He will have occasional frequency during the day. He also reports nocturia x3-4. He does have sleep apnea but does not wear a CPAP. He currently being evaluated by sleep medicine for other options. He will have intermittent urge incontinence. He denies urinary hesitancy. He has failed oxybutynin, detrol LA, vesicare, myrbetriq, and trospium. Patient denies gross hematuria, dysuria, and flank pain. Patient denies a history of kidney stones. Patient is a non smoker.      Past medical, surgical, family and social history in the chart was reviewed and is accurate including any additions to what is in this HPI.    Review of Systems   Constitutional: denies any unintentional weight loss or change in strength.  Integumentary: denies any rashes or pruritus.  Eyes: denies any double vision or eye  pain.  Ear/Nose/Mouth/Throat: denies any nosebleeds or gum bleeds.  Cardiovascular: denies any chest pain or syncope.  Respiratory: denies hemoptysis.  Gastrointestinal: denies nausea or vomiting.  Musculoskeletal: denies muscle cramping or weakness.  Neurologic: denies convulsions or seizures.  Hematologic/Lymphatic: denies bleeding tendencies.  Endocrine: denies heat/cold intolerance.  All other systems have been reviewed and are negative unless otherwise noted in the HPI.    OBJECTIVE:  Physical Exam   Constitutional: NAD  HEENT: AT/NC  Resp: Non labored respirations.  Skin: No jaundice or visible skin lesions.  Neuro: No focal deficits.  Psych: Appropriate mood and affect.    Labs and imaging:  Lab Results   Component Value Date    WBC 8.7 08/24/2023    HGB 12.5 (L) 08/24/2023    HCT 36.9 (L) 08/24/2023     08/24/2023    CHOL 151 08/31/2023    TRIG 144 08/31/2023    HDL 39.7 (A) 08/31/2023    ALT 13 08/24/2023    AST 14 08/24/2023     08/31/2023    K 4.5 08/31/2023     08/31/2023    CREATININE 1.09 08/31/2023    BUN 17 08/31/2023    CO2 28 08/31/2023    TSH 1.57 08/21/2023    PSA 0.62 12/05/2022    INR 1.0 08/21/2023    HGBA1C 5.6 08/22/2023     ASSESSMENT:  Problem List Items Addressed This Visit    None    1. BPH s/p HoLEP  2. OAB symptoms  3. UUI  4. BNC    PLAN:  Discussed non-surgical vs surgical options for hi BNC and LUTS.  Opts to proceed with BNI with concurrent bladder botox.   Risks, benefits and complications were reviewed.     Risks of intravesical Botox injection were discussed with the patient in great detail. Adverse reactions reported have been UTI, dysuria, hematuria, elevated PVR and urinary retention in up to 15% of patients. I explained that urinary retention is not permanent and usually resolves within 6 weeks. She will be taught CIC technique to be used PRN. To decrease the risk of infection, UA will be obtained prior to the injection. She will be started on an  antibiotic one day prior to the Botox injection.     Patient will follow up for another round of intravesical Botox injections when symptoms recur, or sooner if needed.     I spent  30 minutes of dedicated E&M time, including preparation and review of records, notes, and data, time spent with patient/family, and documentation.       All questions were answered to the patient’s satisfaction.  Patient agrees with the plan and wishes to proceed.  Follow-up will be scheduled appropriately.     Scribed for Dr. Olga Alaniz by Angelo Yanes.  I, Dr. Olga Alaniz have personally reviewed and agreed with the information entered by the Virtual Scribe. 11/17/23

## 2023-11-20 ENCOUNTER — TELEMEDICINE CLINICAL SUPPORT (OUTPATIENT)
Dept: SPEECH THERAPY | Facility: HOSPITAL | Age: 78
End: 2023-11-20
Payer: MEDICARE

## 2023-11-20 DIAGNOSIS — J38.00 VOCAL CORD WEAKNESS: Primary | ICD-10-CM

## 2023-11-20 DIAGNOSIS — R49.0 HOARSENESS OF VOICE: ICD-10-CM

## 2023-11-20 DIAGNOSIS — J38.3 BOWING OF TRUE VOCAL CORD: ICD-10-CM

## 2023-11-20 PROCEDURE — 92507 TX SP LANG VOICE COMM INDIV: CPT | Mod: GN,KX,95 | Performed by: SPEECH-LANGUAGE PATHOLOGIST

## 2023-11-20 ASSESSMENT — PAIN - FUNCTIONAL ASSESSMENT: PAIN_FUNCTIONAL_ASSESSMENT: 0-10

## 2023-11-20 ASSESSMENT — PAIN SCALES - GENERAL: PAINLEVEL_OUTOF10: 3

## 2023-11-21 ENCOUNTER — PATIENT OUTREACH (OUTPATIENT)
Dept: PRIMARY CARE | Facility: CLINIC | Age: 78
End: 2023-11-21
Payer: MEDICARE

## 2023-11-21 NOTE — PROGRESS NOTES
Speech-Language Pathology    Outpatient Speech-Language Pathology Treatment     Patient Name: Hank Rick  MRN: [id number]  Today's Date: 11/20/2023            Current Problem:   1. Vocal cord weakness        2. Hoarseness of voice        3. Bowing of true vocal cord              SLP Assessment:    Patient presents for voice therapy s/p thyroplasty.      Reports increased adherence to RMST programming citing a 20% improvement in his vocal quality.      Patient here with RMST - the breather. Successful progression to increased resistance levels - now on 3:3.      Completed PhoRTE with use of sustained [a]. Successful progression of MPT to 18 s.      Limited adherence when it applies to phrase work with patient citing difficulty identifying and completing his phrase practice. To encourage access to practice, voice targeting was switched to paragraph level reading. Patient does best with cues for short phrase length, over articulation and diaphragmatic breath support. Patient to complete paragraph level reading daily to encourage additional carryover and generalization of novel respiratory drive. Patient states his desire to complete this work.      Overall, patient continues to benefit from skilled ST intervention in the area of voice to promote healthy voicing at home and in the community environment.        Plan:  Inpatient/Swing Bed or Outpatient: Outpatient  Treatment/Interventions: Voice  SLP TX Plan: Continue Plan of Care  SLP Plan: Skilled SLP  SLP Frequency: Every other week  Duration: 12 weeks  SLP Discharge Recommendations: Home independent  Discussed POC: Patient  Discussed Risks/Benefits: Yes, Patient  Patient/Caregiver Agreeable: Yes      Subjective   Current Problem:  Hoarseness     Most Recent Visit:  SLP Received On: 10/24/23    General Visit Information:   Reason for Referral: hoarseness  Referred By: Dr. Dee  Past Medical History Relevant to Rehab: s/p bilateral thyroplasty, HL  Arrival:  Independent      Pain Assessment:   Pain Assessment: 0-10  Pain Score: 3  Pain Type: Chronic pain  Pain Location: Back      Objective         Voice:  Voice Interventions: Respiratory Retraining, Vocal Strengthening Techniques, Habituation Training

## 2023-11-21 NOTE — PROGRESS NOTES
Patient has met target of no readmission for (90) days post hospital discharge and is graduated from Transitional Care Management program at this time.     Spoke with Hank.  States doing good.  No questions or concerns at this time.  Aware to follow up with PCP as needed/directed.

## 2023-11-24 PROBLEM — N39.41 URGE INCONTINENCE OF URINE: Status: ACTIVE | Noted: 2023-11-17

## 2023-11-28 ENCOUNTER — TELEPHONE (OUTPATIENT)
Dept: PRIMARY CARE | Facility: CLINIC | Age: 78
End: 2023-11-28
Payer: MEDICARE

## 2023-11-28 NOTE — TELEPHONE ENCOUNTER
Pt. Will be having surgery on 12/22/23 for icision of bladder neck and botox. Dr. Alaniz's office have faxed a form asking to hold plavix. Do you need to see him in the office or just sign the paper?

## 2023-11-29 ENCOUNTER — TREATMENT (OUTPATIENT)
Dept: PHYSICAL THERAPY | Facility: CLINIC | Age: 78
End: 2023-11-29
Payer: MEDICARE

## 2023-11-29 ENCOUNTER — PREP FOR PROCEDURE (OUTPATIENT)
Dept: OTOLARYNGOLOGY | Facility: HOSPITAL | Age: 78
End: 2023-11-29

## 2023-11-29 DIAGNOSIS — M54.9 BACK PAIN WITH RADIATION: Primary | ICD-10-CM

## 2023-11-29 PROBLEM — G47.33 OBSTRUCTIVE SLEEP APNEA: Status: ACTIVE | Noted: 2023-11-15

## 2023-11-29 PROCEDURE — 97112 NEUROMUSCULAR REEDUCATION: CPT | Mod: GP,KX,CQ | Performed by: SPECIALIST/TECHNOLOGIST

## 2023-11-29 PROCEDURE — 97110 THERAPEUTIC EXERCISES: CPT | Mod: GP,KX,CQ | Performed by: SPECIALIST/TECHNOLOGIST

## 2023-11-29 ASSESSMENT — PAIN SCALES - GENERAL: PAINLEVEL_OUTOF10: 3

## 2023-11-29 ASSESSMENT — PAIN - FUNCTIONAL ASSESSMENT: PAIN_FUNCTIONAL_ASSESSMENT: 0-10

## 2023-11-29 NOTE — PROGRESS NOTES
"Physical Therapy  Physical Therapy Treatment    Patient Name: Hank Rick  MRN: [id number]  Today's Date: 11/29/2023       Insurance:  Visit number: 6 of Med erin  Authorization info: No Auth Needed  Insurance Type: Medicare  Cert date start:         Cert date end: 1/24/2024               General       Current Problem  1. Back pain with radiation            Precautions  Precautions Comment: none    Pain Assessment: 0-10  Pain Score: 3  Pain Radiating Towards: R thigh    Subjective:   Patient reports feeling better with pilates and stretch classes.  Patient notes low level of pain on arrival.  Patient does have some numbness/tingling into R thigh. Patient does note having orthotics but not using them since not comfortable.     HEP Performed:  Yes    Objective:   No innominate rotation   LLD L>R 1/2\"  Pirif WNL  Hams 30°  Hip ext 1\"  Quads 12\"  MMT hip ext 3+, hams 3+     Treatments:   NU step L2 5 min   1/4\" lift on R   DBE 2x2 min   8 kg Kb iso SB R/L 1'   Bravo iso rot 7.5# 1' R/L   Hip ext R/L 55#/55# 20x  Hams 20# 20x   SS R/L hams, HF, Quads 1'     Charges: TE x2, NME (cq) KX    Assessment: Patient noted hip felt more even with lift in shoe.  Patient felt good effort with ISO SB and hip extension.  Patient noted ease of hamstring curls.        Plan: Continue to improve balance, anterior flexibility and core stability to improve posture and ADL     Austyn Devine PTA  "

## 2023-12-04 ENCOUNTER — APPOINTMENT (OUTPATIENT)
Dept: SPEECH THERAPY | Facility: HOSPITAL | Age: 78
End: 2023-12-04
Payer: MEDICARE

## 2023-12-04 DIAGNOSIS — G47.00 INSOMNIA, UNSPECIFIED TYPE: ICD-10-CM

## 2023-12-04 RX ORDER — TRAZODONE HYDROCHLORIDE 50 MG/1
50 TABLET ORAL NIGHTLY
Qty: 90 TABLET | Refills: 2 | Status: SHIPPED | OUTPATIENT
Start: 2023-12-04

## 2023-12-07 ENCOUNTER — TREATMENT (OUTPATIENT)
Dept: PHYSICAL THERAPY | Facility: CLINIC | Age: 78
End: 2023-12-07
Payer: MEDICARE

## 2023-12-07 DIAGNOSIS — M25.562 CHRONIC PAIN OF LEFT KNEE: ICD-10-CM

## 2023-12-07 DIAGNOSIS — G89.29 CHRONIC PAIN OF LEFT KNEE: ICD-10-CM

## 2023-12-07 DIAGNOSIS — M17.12 PRIMARY OSTEOARTHRITIS OF LEFT KNEE: ICD-10-CM

## 2023-12-07 DIAGNOSIS — M54.9 BACK PAIN WITH RADIATION: Primary | ICD-10-CM

## 2023-12-07 PROCEDURE — 97110 THERAPEUTIC EXERCISES: CPT | Mod: GP,KX,CQ

## 2023-12-07 PROCEDURE — 97112 NEUROMUSCULAR REEDUCATION: CPT | Mod: GP,KX,CQ

## 2023-12-07 ASSESSMENT — PAIN - FUNCTIONAL ASSESSMENT: PAIN_FUNCTIONAL_ASSESSMENT: 0-10

## 2023-12-07 ASSESSMENT — PAIN SCALES - GENERAL: PAINLEVEL_OUTOF10: 2

## 2023-12-07 NOTE — PROGRESS NOTES
Physical Therapy  Physical Therapy Treatment    Patient Name: Hank Rick  MRN: [id number]  Today's Date: 12/7/2023  Time Calculation  Start Time: 1135  Stop Time: 1220  Time Calculation (min): 45 min    Insurance:  Visit number: 6 of Med erin  Authorization info: No Auth Needed  Insurance Type: Medicare  Cert date start:         Cert date end: 1/24/2024               General       Current Problem  1. Back pain with radiation              Precautions  Precautions Comment: none    Pain Assessment: 0-10  Pain Score: 2  Pain Radiating Towards: Rthigh    Subjective:   Patient reports that he has been going to Pilates and it has been helping with making his back feel better.  Pain is a 2 today.    HEP Performed:  Yes    Objective:   Lumbar ROM- L. Flex/Ext WNL/15°  L. SB R/L 20°/20°    Treatments:   Bike (seat# 6)- 4'  DBE 2x1.5'  Bosu lunge alternating R/L- 1.5'  Biodex balance LOS L12- 50%, 59%  KB 8 kg tandem stand R/L - 1' ea dir.  Hamstring curl 25# 1x25  Hip ab/ad 32.5#/32.5# 2x12 ea.  Multihip ext 55# R/L x 20 ea.  Bravo iso rot 7.5# 1' R/L   SS R/L hams, HF, Quads 1'     Charges: TE 2, NME 1 (CQ, KX Modifiers)    Assessment:   Hank felt good after his session.  No pain.    Plan:   Continue to improve balance, anterior flexibility and core stability to improve posture and ADL.     Russell Hernandez, PTA

## 2023-12-08 ENCOUNTER — APPOINTMENT (OUTPATIENT)
Dept: PHYSICAL THERAPY | Facility: CLINIC | Age: 78
End: 2023-12-08
Payer: MEDICARE

## 2023-12-10 RX ORDER — MELOXICAM 15 MG/1
15 TABLET ORAL DAILY PRN
Qty: 30 TABLET | Refills: 0 | Status: SHIPPED | OUTPATIENT
Start: 2023-12-10 | End: 2024-01-09

## 2023-12-11 DIAGNOSIS — J30.9 ALLERGIC RHINITIS, UNSPECIFIED SEASONALITY, UNSPECIFIED TRIGGER: ICD-10-CM

## 2023-12-11 RX ORDER — MONTELUKAST SODIUM 10 MG/1
10 TABLET ORAL DAILY
Qty: 90 TABLET | Refills: 2 | Status: SHIPPED | OUTPATIENT
Start: 2023-12-11

## 2023-12-12 ENCOUNTER — APPOINTMENT (OUTPATIENT)
Dept: PREADMISSION TESTING | Facility: HOSPITAL | Age: 78
End: 2023-12-12
Payer: MEDICARE

## 2023-12-12 ENCOUNTER — TELEMEDICINE CLINICAL SUPPORT (OUTPATIENT)
Dept: SPEECH THERAPY | Facility: CLINIC | Age: 78
End: 2023-12-12
Payer: MEDICARE

## 2023-12-12 DIAGNOSIS — R49.0 HOARSENESS OF VOICE: Primary | ICD-10-CM

## 2023-12-12 DIAGNOSIS — J38.00 VOCAL CORD WEAKNESS: ICD-10-CM

## 2023-12-12 PROCEDURE — 92507 TX SP LANG VOICE COMM INDIV: CPT | Mod: GN,KX,95 | Performed by: SPEECH-LANGUAGE PATHOLOGIST

## 2023-12-12 ASSESSMENT — PAIN SCALES - GENERAL: PAINLEVEL_OUTOF10: 2

## 2023-12-12 ASSESSMENT — PAIN - FUNCTIONAL ASSESSMENT: PAIN_FUNCTIONAL_ASSESSMENT: 0-10

## 2023-12-12 NOTE — PROGRESS NOTES
Speech-Language Pathology    Outpatient Speech-Language Pathology Treatment     Patient Name: Hank Rick  MRN: [id number]  Today's Date: 12/12/2023     Time Calculation  Start Time: 1430  Stop Time: 1510  Time Calculation (min): 40 min      Current Problem:   1. Hoarseness of voice        2. Vocal cord weakness              SLP Assessment:   Patient presents for voice therapy s/p thyroplasty.      Reports increased adherence to RMST programming citing a 60% improvement in his vocal quality.      Patient here with RMST - the breather. Successful progression to increased resistance levels - now on 4:4.      Voice today is clear with no rasp during initial conversational exchange. Patient at 70% accuracy during paragraph level reading. He does well with identification of breath timing however; will often demo breath holding in lieu of true inspiratory effort. Able to increase to 90%+ accuracy with clinician cue. States he has similar challenges when completing his pilates class.     Patient to add daily readings to HEP. Will re-assess voice after the holidays - may seek discharge given good maintenance of clear voicing.      Overall, patient continues to benefit from skilled ST intervention in the area of voice to promote healthy voicing at home and in the community environment.        Plan:  Inpatient/Swing Bed or Outpatient: Outpatient  Treatment/Interventions: Voice  SLP TX Plan: Continue Plan of Care  SLP Plan: Skilled SLP  SLP Frequency: Every other week  Duration: 12 weeks  SLP Discharge Recommendations: Home independent  Discussed POC: Patient  Discussed Risks/Benefits: Yes, Patient  Patient/Caregiver Agreeable: Yes      Subjective   Current Problem:  Hoarseness     Most Recent Visit:  SLP Received On: 12/12/23    General Visit Information:   Reason for Referral: hoarseness  Referred By: Dr. Dee  Past Medical History Relevant to Rehab: s/p bilateral thyroplasty, HL  Arrival: Independent      Pain  Assessment:   Pain Assessment: 0-10  Pain Score: 2  Pain Type: Chronic pain  Pain Location: Back      Objective         Voice:  Voice Interventions: Respiratory Retraining, Vocal Strengthening Techniques, Habituation Training

## 2023-12-15 ENCOUNTER — APPOINTMENT (OUTPATIENT)
Dept: PHYSICAL THERAPY | Facility: CLINIC | Age: 78
End: 2023-12-15
Payer: MEDICARE

## 2023-12-15 DIAGNOSIS — G25.0 ESSENTIAL TREMOR: ICD-10-CM

## 2023-12-17 RX ORDER — PROPRANOLOL HYDROCHLORIDE 60 MG/1
60 CAPSULE, EXTENDED RELEASE ORAL DAILY
Qty: 90 CAPSULE | Refills: 1 | Status: SHIPPED | OUTPATIENT
Start: 2023-12-17

## 2023-12-26 ENCOUNTER — APPOINTMENT (OUTPATIENT)
Dept: UROLOGY | Facility: CLINIC | Age: 78
End: 2023-12-26
Payer: MEDICARE

## 2023-12-28 ENCOUNTER — TELEMEDICINE CLINICAL SUPPORT (OUTPATIENT)
Dept: SPEECH THERAPY | Facility: CLINIC | Age: 78
End: 2023-12-28
Payer: MEDICARE

## 2023-12-28 ENCOUNTER — PRE-ADMISSION TESTING (OUTPATIENT)
Dept: PREADMISSION TESTING | Facility: HOSPITAL | Age: 78
End: 2023-12-28
Payer: MEDICARE

## 2023-12-28 ENCOUNTER — TELEPHONE (OUTPATIENT)
Dept: NEUROLOGY | Facility: CLINIC | Age: 78
End: 2023-12-28

## 2023-12-28 ENCOUNTER — TELEPHONE (OUTPATIENT)
Dept: PRIMARY CARE | Facility: CLINIC | Age: 78
End: 2023-12-28

## 2023-12-28 VITALS
DIASTOLIC BLOOD PRESSURE: 70 MMHG | TEMPERATURE: 98.4 F | RESPIRATION RATE: 16 BRPM | BODY MASS INDEX: 25.46 KG/M2 | HEART RATE: 53 BPM | OXYGEN SATURATION: 94 % | WEIGHT: 192.13 LBS | HEIGHT: 73 IN | SYSTOLIC BLOOD PRESSURE: 119 MMHG

## 2023-12-28 DIAGNOSIS — R39.15 BENIGN PROSTATIC HYPERPLASIA (BPH) WITH URINARY URGENCY: ICD-10-CM

## 2023-12-28 DIAGNOSIS — Z01.818 PREOPERATIVE TESTING: Primary | ICD-10-CM

## 2023-12-28 DIAGNOSIS — R49.0 HOARSENESS OF VOICE: Primary | ICD-10-CM

## 2023-12-28 DIAGNOSIS — J38.00 VOCAL CORD WEAKNESS: ICD-10-CM

## 2023-12-28 DIAGNOSIS — N32.89 OTHER SPECIFIED DISORDERS OF BLADDER: ICD-10-CM

## 2023-12-28 DIAGNOSIS — N39.41 URGE INCONTINENCE OF URINE: ICD-10-CM

## 2023-12-28 DIAGNOSIS — Z01.818 PREOP TESTING: ICD-10-CM

## 2023-12-28 DIAGNOSIS — N40.1 BENIGN PROSTATIC HYPERPLASIA (BPH) WITH URINARY URGENCY: ICD-10-CM

## 2023-12-28 LAB
ANION GAP SERPL CALC-SCNC: 11 MMOL/L (ref 10–20)
BUN SERPL-MCNC: 20 MG/DL (ref 6–23)
CALCIUM SERPL-MCNC: 9 MG/DL (ref 8.6–10.3)
CHLORIDE SERPL-SCNC: 95 MMOL/L (ref 98–107)
CO2 SERPL-SCNC: 27 MMOL/L (ref 21–32)
CREAT SERPL-MCNC: 1.16 MG/DL (ref 0.5–1.3)
ERYTHROCYTE [DISTWIDTH] IN BLOOD BY AUTOMATED COUNT: 12.8 % (ref 11.5–14.5)
GFR SERPL CREATININE-BSD FRML MDRD: 64 ML/MIN/1.73M*2
GLUCOSE SERPL-MCNC: 107 MG/DL (ref 74–99)
HCT VFR BLD AUTO: 38 % (ref 41–52)
HGB BLD-MCNC: 12.7 G/DL (ref 13.5–17.5)
INR PPP: 1 (ref 0.9–1.2)
MCH RBC QN AUTO: 31.4 PG (ref 26–34)
MCHC RBC AUTO-ENTMCNC: 33.4 G/DL (ref 32–36)
MCV RBC AUTO: 94 FL (ref 80–100)
NRBC BLD-RTO: ABNORMAL /100{WBCS}
PLATELET # BLD AUTO: 224 X10*3/UL (ref 150–450)
POTASSIUM SERPL-SCNC: 4.2 MMOL/L (ref 3.5–5.3)
PROTHROMBIN TIME: 9.5 SECONDS (ref 9.3–12.7)
RBC # BLD AUTO: 4.05 X10*6/UL (ref 4.5–5.9)
SARS-COV-2 RNA RESP QL NAA+PROBE: NOT DETECTED
SODIUM SERPL-SCNC: 129 MMOL/L (ref 136–145)
WBC # BLD AUTO: 8.4 X10*3/UL (ref 4.4–11.3)

## 2023-12-28 PROCEDURE — 36415 COLL VENOUS BLD VENIPUNCTURE: CPT

## 2023-12-28 PROCEDURE — 92507 TX SP LANG VOICE COMM INDIV: CPT | Mod: GN,KX,95 | Performed by: SPEECH-LANGUAGE PATHOLOGIST

## 2023-12-28 PROCEDURE — 80048 BASIC METABOLIC PNL TOTAL CA: CPT

## 2023-12-28 PROCEDURE — 85610 PROTHROMBIN TIME: CPT

## 2023-12-28 PROCEDURE — 85027 COMPLETE CBC AUTOMATED: CPT

## 2023-12-28 PROCEDURE — 87086 URINE CULTURE/COLONY COUNT: CPT

## 2023-12-28 PROCEDURE — 87635 SARS-COV-2 COVID-19 AMP PRB: CPT

## 2023-12-28 RX ORDER — ASPIRIN 81 MG/1
81 TABLET ORAL DAILY
Status: ON HOLD | COMMUNITY
End: 2024-01-14 | Stop reason: SDUPTHER

## 2023-12-28 RX ORDER — DEXTROMETHORPHAN HYDROBROMIDE, GUAIFENESIN 5; 100 MG/5ML; MG/5ML
1300 LIQUID ORAL DAILY PRN
COMMUNITY

## 2023-12-28 ASSESSMENT — ENCOUNTER SYMPTOMS
COUGH: 1
FREQUENCY: 1

## 2023-12-28 ASSESSMENT — PAIN SCALES - GENERAL
PAINLEVEL_OUTOF10: 0 - NO PAIN
PAINLEVEL_OUTOF10: 0 - NO PAIN

## 2023-12-28 ASSESSMENT — PAIN - FUNCTIONAL ASSESSMENT
PAIN_FUNCTIONAL_ASSESSMENT: 0-10
PAIN_FUNCTIONAL_ASSESSMENT: 0-10

## 2023-12-28 NOTE — H&P (VIEW-ONLY)
CPM/PAT Evaluation     Hank Rick is a 78 y.o. male   Chief Complaint: enlarged prostate, urinary urgency and incontinence    HPI:  Patient is a 77 y/o alert and oriented male coming in for PAT for a scheduled Botox 100 units and Incision Transurethral Bladder neck on 1/4/24 w/ Dr. Alaniz.  The patient reports he has urinary frequency and intermittent incontinence.  He denies hematuria, dysuria or burning with urination.  No urgency.  No fever or chills.  No lower abdomina, back or flank pain.  Patient denies chest pain, SOB, ROMERO and NVDC.  Patient also denies Hx: DVT/PE.  Current medications were reviewed and a presurgical mediation schedule was provided.  He has no questions at this time.   Today in Pat the patient reports having nasal congestion and productive cough (yellow sputum) since Sunday.  No loss of smell or taste, no diarrhea, no sob or wheezing.  Has not tested for covid and has not called his pcp.   Past Medical History:   Diagnosis Date    Cerebral vascular accident (CMS/[facility]) 05/09/2023    COVID-19 11/15/2022    COVID-19 virus infection    Esophageal obstruction 05/15/2022    Esophageal stricture    Essential tremor 12/29/2022    Essential tremor    GERD (gastroesophageal reflux disease)     Hard of hearing     Memory impairment     Olecranon bursitis, left elbow 04/26/2016    Olecranon bursitis of left elbow    Personal history of diseases of the blood and blood-forming organs and certain disorders involving the immune mechanism 05/17/2022    History of anemia    Personal history of other diseases of the digestive system 11/13/2014    History of gastroesophageal reflux (GERD)    Personal history of other diseases of the digestive system 01/14/2021    History of hiatal hernia    Personal history of other specified conditions 11/29/2018    History of urinary frequency    Rash and other nonspecific skin eruption 11/02/2021    Skin rash    Salmonella enteritis     Salmonella enteritis    TIA  (transient ischemic attack)     Vertebral artery stenosis       Past Surgical History:   Procedure Laterality Date    BACK SURGERY      CT ANGIO NECK  2023    CT NECK ANGIO W AND WO IV CONTRAST 2023 [facility]    CT HEAD ANGIO W AND WO IV CONTRAST  2023    CT HEAD ANGIO W AND WO IV CONTRAST 2023 [facility]    MR HEAD ANGIO WO IV CONTRAST  2023    MR HEAD ANGIO WO IV CONTRAST 2023 [facility]    MR NECK ANGIO WO IV CONTRAST  2023    MR NECK ANGIO WO IV CONTRAST 2023 [facility]    OTHER SURGICAL HISTORY  2015    Esophagogastric Fundoplasty Laparoscopic For Paraesophageal Hernia Repair    OTHER SURGICAL HISTORY  2014    Shoulder Surgery Left    OTHER SURGICAL HISTORY      REFRACTIVE SURGERY  2014    Corneal LASIK    TONSILLECTOMY  2014    Tonsillectomy        Allergies   Allergen Reactions    Pollen Extracts Other    Gabapentin Anxiety        Current Outpatient Medications on File Prior to Visit   Medication Sig Dispense Refill    albuterol 90 mcg/actuation inhaler Inhale 2 puffs. Every 4 to 6 hours as needed as directed      amLODIPine (Norvasc) 2.5 mg tablet Take  2 tablets every day , please note change in dose 180 tablet 1    atorvastatin (Lipitor) 40 mg tablet Take 1 tablet (40 mg) by mouth once daily. 90 tablet 2    azelastine (Astelin) 137 mcg (0.1 %) nasal spray Administer 2 sprays into each nostril 2 times a day. 30 mL 5    [] clopidogrel (Plavix) 75 mg tablet Take 1 tablet (75 mg) by mouth once daily. 90 tablet 2    diclofenac sodium (Voltaren) 1 % gel gel Apply topically 2 times a day as needed.      fexofenadine (Allegra) 180 mg tablet Take 1 tablet (180 mg) by mouth once daily.      fluticasone (Flonase) 50 mcg/actuation nasal spray Administer 1-2 sprays into each nostril once daily.      lidocaine 4 % patch APPLY 1 PATCH TOPICALLY TO AFFECTED AREA ONCE DAILY. LEAVE IN PLACE FOR 12 HOURS, THEN REMOVE AND KEEP OFF FOR 12 HOURS. 15 patch 0     meloxicam (Mobic) 15 mg tablet Take 1 tablet (15 mg) by mouth once daily as needed for moderate pain (4 - 6). Take with food 30 tablet 0    memantine (Namenda) 5 mg tablet 1 po at bedtime x 4 weeks, then 1 po bid      montelukast (Singulair) 10 mg tablet Take 1 tablet (10 mg) by mouth once daily. 90 tablet 2    omeprazole OTC (PriLOSEC OTC) 20 mg EC tablet Take 1 tablet (20 mg) by mouth once daily.      propranolol (Inderal) 10 mg tablet Take 1 tablet (10 mg) by mouth 3 times a day.      propranolol LA (Inderal LA) 60 mg 24 hr capsule Take 1 capsule (60 mg) by mouth once daily. 90 capsule 1    sildenafil (Viagra) 100 mg tablet Take 0.5-1 tablets ( mg) by mouth if needed for erectile dysfunction (1 hour prior to sexual activity (max 1 per day)). 30 tablet 6    topiramate (Topamax) 25 mg tablet Take 1-2 tablets (25-50 mg) by mouth once daily at bedtime.      traZODone (Desyrel) 50 mg tablet Take 1 tablet (50 mg) by mouth once daily at bedtime. 90 tablet 2    [DISCONTINUED] amitriptyline (Elavil) 10 mg tablet Take 1-2 tablets (10-20 mg) by mouth once daily at bedtime.      [DISCONTINUED] oxyCODONE ER (OxyCONTIN) 15 mg 12 hr tablet 1 tablet (15 mg) every 12 hours. Do not crush, chew, or split.      [DISCONTINUED] tiZANidine (Zanaflex) 4 mg tablet Take 1 tablet (4 mg) by mouth every 6 hours if needed for muscle spasms.       No current facility-administered medications on file prior to visit.      Vitals:    12/28/23 1120   BP: 119/70   Pulse: 53   Resp: 16   Temp: 36.9 °C (98.4 °F)   SpO2: 94%        Review of Systems   Respiratory:  Positive for cough.         Nasal congestion, post nasal drip   Genitourinary:  Positive for frequency.        See hpi for details, has nocturia    All other systems reviewed and are negative.     Physical Exam  Vitals and nursing note reviewed.   Constitutional:       Appearance: Normal appearance.   HENT:      Head: Normocephalic and atraumatic.      Mouth/Throat:      Mouth: Mucous  membranes are moist.      Pharynx: Oropharynx is clear.   Eyes:      Pupils: Pupils are equal, round, and reactive to light.   Cardiovascular:      Rate and Rhythm: Normal rate and regular rhythm.      Heart sounds: Normal heart sounds.   Pulmonary:      Effort: Pulmonary effort is normal.      Breath sounds: Normal breath sounds.   Abdominal:      General: Bowel sounds are normal.      Palpations: Abdomen is soft.   Musculoskeletal:         General: Normal range of motion.      Cervical back: Normal range of motion.   Skin:     General: Skin is warm and dry.   Neurological:      Mental Status: He is alert and oriented to person, place, and time.      Comments: Essential tremor right arm     Psychiatric:         Mood and Affect: Mood normal.         Behavior: Behavior normal.         Thought Content: Thought content normal.         Judgment: Judgment normal.        PAT AIRWAY:   Airway:     Mallampati::  IV    TM distance::  >3 FB    Neck ROM::  Full  Has dental crowns  No issues with anesthesia    Assessment and Plan:   Benign Prostatic Hyperplasia with Urinary Urgency, Urge Incontinence of Urine  Botox 100 units, Incision Transurethral Bladder Neck  S/P Holep 4/2022 w/ Dr. Ohara    Hx: CVA/TIA 5/9/2023  Managed with Plavix   Managed with aspirin 81mg daily  Has vertebral artery stenosis  Will contact pcp for plavix and asa instructions    Hypertension  Managed with norvasc 2.5mg daily  Managed with inderal LA 60mg daily  Echo 5/11/23  Left ventricular systolic function is nromal with EF estimated at 70-75%  Mild aortic regurgitation  No evidence of patent foramen ovuale    Essential Tremor  Managed with Inderal 10mg daily     GERD  Managed with omeprazole 20mg daily  No recent flares    Known Sleep Apnea  Had nasal endoscopy under anesthesia w/ Dr. Bob on 11/6/23  Follow up with ENT as scheduled  Will have inspire device in the future    Mixed Alzheimer's and vascular Dementia  Managed with Namenda 5mg  bid  Managed with trazadone 50mg daily  Managed with topamax 25mg daily    URI symptoms  Symptoms of cough, nasal congestion and post nasal drip developed on 12/24  No wheezing, sob, loss of smell or taste, no diarrhea, no fever or chills  Will check for covid     ASA III  RCRI - 1 points  Class II Risk 6%  VERONIKA - points Risk for WALKER   NSQIP - Predicted length of stay days  ARISCAT - 11 points Low Risk 1.6%  DASI 34.7 Points 7.01 Mets  DIMAS - 0.3%  JHFRAT -14  points high risk for falls  Clearance - PCP Dr. Larose for pl;avix and asa instructions prior to procedure.  Advised patient to also call for instructions  PAT Testing - CBC, BMP, PT/PTT, UA, per Dr. Alaniz  Covid Test    Face to Face patient contact time 20 minutes    RAFAEL Hillman-CNP 12/28/2023 11:10 AM  Results for orders placed or performed in visit on 12/28/23 (from the past 24 hour(s))   Basic Metabolic Panel   Result Value Ref Range    Glucose 107 (H) 74 - 99 mg/dL    Sodium 129 (L) 136 - 145 mmol/L    Potassium 4.2 3.5 - 5.3 mmol/L    Chloride 95 (L) 98 - 107 mmol/L    Bicarbonate 27 21 - 32 mmol/L    Anion Gap 11 10 - 20 mmol/L    Urea Nitrogen 20 6 - 23 mg/dL    Creatinine 1.16 0.50 - 1.30 mg/dL    eGFR 64 >60 mL/min/1.73m*2    Calcium 9.0 8.6 - 10.3 mg/dL   CBC   Result Value Ref Range    WBC 8.4 4.4 - 11.3 x10*3/uL    nRBC      RBC 4.05 (L) 4.50 - 5.90 x10*6/uL    Hemoglobin 12.7 (L) 13.5 - 17.5 g/dL    Hematocrit 38.0 (L) 41.0 - 52.0 %    MCV 94 80 - 100 fL    MCH 31.4 26.0 - 34.0 pg    MCHC 33.4 32.0 - 36.0 g/dL    RDW 12.8 11.5 - 14.5 %    Platelets 224 150 - 450 x10*3/uL   Protime-INR   Result Value Ref Range    Protime 9.5 9.3 - 12.7 seconds    INR 1.0 0.9 - 1.2   Sars-CoV-2 PCR, Symptomatic   Result Value Ref Range    Coronavirus 2019, PCR Not Detected Not Detected

## 2023-12-28 NOTE — PREPROCEDURE INSTRUCTIONS
Medication List            Accurate as of December 28, 2023 11:14 AM. Always use your most recent med list.                acetaminophen 650 mg ER tablet  Commonly known as: Tylenol 8 HOUR  Medication Adjustments for Surgery: Take morning of surgery with sip of water, no other fluids     albuterol 90 mcg/actuation inhaler  Medication Adjustments for Surgery: Take morning of surgery with sip of water, no other fluids     amLODIPine 2.5 mg tablet  Commonly known as: Norvasc  Take  2 tablets every day , please note change in dose  Medication Adjustments for Surgery: Take morning of surgery with sip of water, no other fluids     aspirin 81 mg EC tablet  Medication Adjustments for Surgery: Other (Comment)  Notes to patient: Per prescriber     atorvastatin 40 mg tablet  Commonly known as: Lipitor  Take 1 tablet (40 mg) by mouth once daily.  Medication Adjustments for Surgery: Take morning of surgery with sip of water, no other fluids     azelastine 137 mcg (0.1 %) nasal spray  Commonly known as: Astelin  Administer 2 sprays into each nostril 2 times a day.  Medication Adjustments for Surgery: Take morning of surgery with sip of water, no other fluids     clopidogrel 75 mg tablet  Commonly known as: Plavix  Take 1 tablet (75 mg) by mouth once daily.  Medication Adjustments for Surgery: Other (Comment)  Notes to patient: Per prescriber on hold time     diclofenac sodium 1 % gel gel  Commonly known as: Voltaren  Medication Adjustments for Surgery: Stop 7 days before surgery     fexofenadine 180 mg tablet  Commonly known as: Allegra  Medication Adjustments for Surgery: Take morning of surgery with sip of water, no other fluids     fluticasone 50 mcg/actuation nasal spray  Commonly known as: Flonase  Medication Adjustments for Surgery: Take morning of surgery with sip of water, no other fluids     Lidocaine Pain Relief 4 % patch  Generic drug: lidocaine  APPLY 1 PATCH TOPICALLY TO AFFECTED AREA ONCE DAILY. LEAVE IN PLACE  FOR 12 HOURS, THEN REMOVE AND KEEP OFF FOR 12 HOURS.  Medication Adjustments for Surgery: Stop 1 day before surgery     meloxicam 15 mg tablet  Commonly known as: Mobic  Take 1 tablet (15 mg) by mouth once daily as needed for moderate pain (4 - 6). Take with food  Medication Adjustments for Surgery: Stop 7 days before surgery     memantine 5 mg tablet  Commonly known as: Namenda  Medication Adjustments for Surgery: Take morning of surgery with sip of water, no other fluids     montelukast 10 mg tablet  Commonly known as: Singulair  Take 1 tablet (10 mg) by mouth once daily.  Medication Adjustments for Surgery: Take morning of surgery with sip of water, no other fluids     omeprazole OTC 20 mg EC tablet  Commonly known as: PriLOSEC OTC  Medication Adjustments for Surgery: Take morning of surgery with sip of water, no other fluids     * propranolol 10 mg tablet  Commonly known as: Inderal  Medication Adjustments for Surgery: Take morning of surgery with sip of water, no other fluids     * propranolol LA 60 mg 24 hr capsule  Commonly known as: Inderal LA  Take 1 capsule (60 mg) by mouth once daily.  Medication Adjustments for Surgery: Take morning of surgery with sip of water, no other fluids     sildenafil 100 mg tablet  Commonly known as: Viagra  Take 0.5-1 tablets ( mg) by mouth if needed for erectile dysfunction (1 hour prior to sexual activity (max 1 per day)).  Medication Adjustments for Surgery: Stop 1 day before surgery     topiramate 25 mg tablet  Commonly known as: Topamax  Medication Adjustments for Surgery: Continue until night before surgery     traZODone 50 mg tablet  Commonly known as: Desyrel  Take 1 tablet (50 mg) by mouth once daily at bedtime.  Medication Adjustments for Surgery: Continue until night before surgery           * This list has 2 medication(s) that are the same as other medications prescribed for you. Read the directions carefully, and ask your doctor or other care provider to  review them with you.                                  NPO Instructions:    Do not eat any food after midnight the night before your surgery/procedure.    Additional Instructions:     Seven/Six Days before Surgery:  Review your medication instructions, stop indicated medications  Five Days before Surgery:  Review your medication instructions, stop indicated medications  Three Days before Surgery:  Review your medication instructions, stop indicated medications  The Day before Surgery:  Review your medication instructions, stop indicated medications  You will be contacted regarding the time of your arrival to facility and surgery time  Do not eat any food after Midnight  Day of Surgery:  Review your medication instructions, take indicated medications  Wear  comfortable loose fitting clothing  Do not use moisturizers, creams, lotions or perfume  All jewelry and valuables should be left at home

## 2023-12-28 NOTE — TELEPHONE ENCOUNTER
Pt. States will be having 2 procedures 4 days apart and will have general anesthesia for both. Dr. Alaniz is doing botox ,and  4 days later he will be having an inspir insertion which takes the place of a c-pap.  Pt. Also wants to know if it is ok for him to be off of Clopidogrel for these procedures. I have left a mess. For pt. For further details.

## 2023-12-28 NOTE — CPM/PAT H&P
CPM/PAT Evaluation     Hank Rick is a 78 y.o. male   Chief Complaint: enlarged prostate, urinary urgency and incontinence    HPI:  Patient is a 77 y/o alert and oriented male coming in for PAT for a scheduled Botox 100 units and Incision Transurethral Bladder neck on 1/4/24 w/ Dr. Alaniz.  The patient reports he has urinary frequency and intermittent incontinence.  He denies hematuria, dysuria or burning with urination.  No urgency.  No fever or chills.  No lower abdomina, back or flank pain.  Patient denies chest pain, SOB, ROMERO and NVDC.  Patient also denies Hx: DVT/PE.  Current medications were reviewed and a presurgical mediation schedule was provided.  He has no questions at this time.   Today in Pat the patient reports having nasal congestion and productive cough (yellow sputum) since Sunday.  No loss of smell or taste, no diarrhea, no sob or wheezing.  Has not tested for covid and has not called his pcp.   Past Medical History:   Diagnosis Date    Cerebral vascular accident (CMS/[facility]) 05/09/2023    COVID-19 11/15/2022    COVID-19 virus infection    Esophageal obstruction 05/15/2022    Esophageal stricture    Essential tremor 12/29/2022    Essential tremor    GERD (gastroesophageal reflux disease)     Hard of hearing     Memory impairment     Olecranon bursitis, left elbow 04/26/2016    Olecranon bursitis of left elbow    Personal history of diseases of the blood and blood-forming organs and certain disorders involving the immune mechanism 05/17/2022    History of anemia    Personal history of other diseases of the digestive system 11/13/2014    History of gastroesophageal reflux (GERD)    Personal history of other diseases of the digestive system 01/14/2021    History of hiatal hernia    Personal history of other specified conditions 11/29/2018    History of urinary frequency    Rash and other nonspecific skin eruption 11/02/2021    Skin rash    Salmonella enteritis     Salmonella enteritis    TIA  (transient ischemic attack)     Vertebral artery stenosis       Past Surgical History:   Procedure Laterality Date    BACK SURGERY      CT ANGIO NECK  2023    CT NECK ANGIO W AND WO IV CONTRAST 2023 [facility]    CT HEAD ANGIO W AND WO IV CONTRAST  2023    CT HEAD ANGIO W AND WO IV CONTRAST 2023 [facility]    MR HEAD ANGIO WO IV CONTRAST  2023    MR HEAD ANGIO WO IV CONTRAST 2023 [facility]    MR NECK ANGIO WO IV CONTRAST  2023    MR NECK ANGIO WO IV CONTRAST 2023 [facility]    OTHER SURGICAL HISTORY  2015    Esophagogastric Fundoplasty Laparoscopic For Paraesophageal Hernia Repair    OTHER SURGICAL HISTORY  2014    Shoulder Surgery Left    OTHER SURGICAL HISTORY      REFRACTIVE SURGERY  2014    Corneal LASIK    TONSILLECTOMY  2014    Tonsillectomy        Allergies   Allergen Reactions    Pollen Extracts Other    Gabapentin Anxiety        Current Outpatient Medications on File Prior to Visit   Medication Sig Dispense Refill    albuterol 90 mcg/actuation inhaler Inhale 2 puffs. Every 4 to 6 hours as needed as directed      amLODIPine (Norvasc) 2.5 mg tablet Take  2 tablets every day , please note change in dose 180 tablet 1    atorvastatin (Lipitor) 40 mg tablet Take 1 tablet (40 mg) by mouth once daily. 90 tablet 2    azelastine (Astelin) 137 mcg (0.1 %) nasal spray Administer 2 sprays into each nostril 2 times a day. 30 mL 5    [] clopidogrel (Plavix) 75 mg tablet Take 1 tablet (75 mg) by mouth once daily. 90 tablet 2    diclofenac sodium (Voltaren) 1 % gel gel Apply topically 2 times a day as needed.      fexofenadine (Allegra) 180 mg tablet Take 1 tablet (180 mg) by mouth once daily.      fluticasone (Flonase) 50 mcg/actuation nasal spray Administer 1-2 sprays into each nostril once daily.      lidocaine 4 % patch APPLY 1 PATCH TOPICALLY TO AFFECTED AREA ONCE DAILY. LEAVE IN PLACE FOR 12 HOURS, THEN REMOVE AND KEEP OFF FOR 12 HOURS. 15 patch 0     meloxicam (Mobic) 15 mg tablet Take 1 tablet (15 mg) by mouth once daily as needed for moderate pain (4 - 6). Take with food 30 tablet 0    memantine (Namenda) 5 mg tablet 1 po at bedtime x 4 weeks, then 1 po bid      montelukast (Singulair) 10 mg tablet Take 1 tablet (10 mg) by mouth once daily. 90 tablet 2    omeprazole OTC (PriLOSEC OTC) 20 mg EC tablet Take 1 tablet (20 mg) by mouth once daily.      propranolol (Inderal) 10 mg tablet Take 1 tablet (10 mg) by mouth 3 times a day.      propranolol LA (Inderal LA) 60 mg 24 hr capsule Take 1 capsule (60 mg) by mouth once daily. 90 capsule 1    sildenafil (Viagra) 100 mg tablet Take 0.5-1 tablets ( mg) by mouth if needed for erectile dysfunction (1 hour prior to sexual activity (max 1 per day)). 30 tablet 6    topiramate (Topamax) 25 mg tablet Take 1-2 tablets (25-50 mg) by mouth once daily at bedtime.      traZODone (Desyrel) 50 mg tablet Take 1 tablet (50 mg) by mouth once daily at bedtime. 90 tablet 2    [DISCONTINUED] amitriptyline (Elavil) 10 mg tablet Take 1-2 tablets (10-20 mg) by mouth once daily at bedtime.      [DISCONTINUED] oxyCODONE ER (OxyCONTIN) 15 mg 12 hr tablet 1 tablet (15 mg) every 12 hours. Do not crush, chew, or split.      [DISCONTINUED] tiZANidine (Zanaflex) 4 mg tablet Take 1 tablet (4 mg) by mouth every 6 hours if needed for muscle spasms.       No current facility-administered medications on file prior to visit.      Vitals:    12/28/23 1120   BP: 119/70   Pulse: 53   Resp: 16   Temp: 36.9 °C (98.4 °F)   SpO2: 94%        Review of Systems   Respiratory:  Positive for cough.         Nasal congestion, post nasal drip   Genitourinary:  Positive for frequency.        See hpi for details, has nocturia    All other systems reviewed and are negative.     Physical Exam  Vitals and nursing note reviewed.   Constitutional:       Appearance: Normal appearance.   HENT:      Head: Normocephalic and atraumatic.      Mouth/Throat:      Mouth: Mucous  membranes are moist.      Pharynx: Oropharynx is clear.   Eyes:      Pupils: Pupils are equal, round, and reactive to light.   Cardiovascular:      Rate and Rhythm: Normal rate and regular rhythm.      Heart sounds: Normal heart sounds.   Pulmonary:      Effort: Pulmonary effort is normal.      Breath sounds: Normal breath sounds.   Abdominal:      General: Bowel sounds are normal.      Palpations: Abdomen is soft.   Musculoskeletal:         General: Normal range of motion.      Cervical back: Normal range of motion.   Skin:     General: Skin is warm and dry.   Neurological:      Mental Status: He is alert and oriented to person, place, and time.      Comments: Essential tremor right arm     Psychiatric:         Mood and Affect: Mood normal.         Behavior: Behavior normal.         Thought Content: Thought content normal.         Judgment: Judgment normal.        PAT AIRWAY:   Airway:     Mallampati::  IV    TM distance::  >3 FB    Neck ROM::  Full  Has dental crowns  No issues with anesthesia    Assessment and Plan:   Benign Prostatic Hyperplasia with Urinary Urgency, Urge Incontinence of Urine  Botox 100 units, Incision Transurethral Bladder Neck  S/P Holep 4/2022 w/ Dr. Ohara    Hx: CVA/TIA 5/9/2023  Managed with Plavix   Managed with aspirin 81mg daily  Has vertebral artery stenosis  Will contact pcp for plavix and asa instructions    Hypertension  Managed with norvasc 2.5mg daily  Managed with inderal LA 60mg daily  Echo 5/11/23  Left ventricular systolic function is nromal with EF estimated at 70-75%  Mild aortic regurgitation  No evidence of patent foramen ovuale    Essential Tremor  Managed with Inderal 10mg daily     GERD  Managed with omeprazole 20mg daily  No recent flares    Known Sleep Apnea  Had nasal endoscopy under anesthesia w/ Dr. Bob on 11/6/23  Follow up with ENT as scheduled  Will have inspire device in the future    Mixed Alzheimer's and vascular Dementia  Managed with Namenda 5mg  bid  Managed with trazadone 50mg daily  Managed with topamax 25mg daily    URI symptoms  Symptoms of cough, nasal congestion and post nasal drip developed on 12/24  No wheezing, sob, loss of smell or taste, no diarrhea, no fever or chills  Will check for covid     ASA III  RCRI - 1 points  Class II Risk 6%  VERONIKA - points Risk for WALKER   NSQIP - Predicted length of stay days  ARISCAT - 11 points Low Risk 1.6%  DASI 34.7 Points 7.01 Mets  DIMAS - 0.3%  JHFRAT -14  points high risk for falls  Clearance - PCP Dr. Larose for pl;avix and asa instructions prior to procedure.  Advised patient to also call for instructions  PAT Testing - CBC, BMP, PT/PTT, UA, per Dr. Alaniz  Covid Test    Face to Face patient contact time 20 minutes    RAFAEL Hillman-CNP 12/28/2023 11:10 AM  Results for orders placed or performed in visit on 12/28/23 (from the past 24 hour(s))   Basic Metabolic Panel   Result Value Ref Range    Glucose 107 (H) 74 - 99 mg/dL    Sodium 129 (L) 136 - 145 mmol/L    Potassium 4.2 3.5 - 5.3 mmol/L    Chloride 95 (L) 98 - 107 mmol/L    Bicarbonate 27 21 - 32 mmol/L    Anion Gap 11 10 - 20 mmol/L    Urea Nitrogen 20 6 - 23 mg/dL    Creatinine 1.16 0.50 - 1.30 mg/dL    eGFR 64 >60 mL/min/1.73m*2    Calcium 9.0 8.6 - 10.3 mg/dL   CBC   Result Value Ref Range    WBC 8.4 4.4 - 11.3 x10*3/uL    nRBC      RBC 4.05 (L) 4.50 - 5.90 x10*6/uL    Hemoglobin 12.7 (L) 13.5 - 17.5 g/dL    Hematocrit 38.0 (L) 41.0 - 52.0 %    MCV 94 80 - 100 fL    MCH 31.4 26.0 - 34.0 pg    MCHC 33.4 32.0 - 36.0 g/dL    RDW 12.8 11.5 - 14.5 %    Platelets 224 150 - 450 x10*3/uL   Protime-INR   Result Value Ref Range    Protime 9.5 9.3 - 12.7 seconds    INR 1.0 0.9 - 1.2   Sars-CoV-2 PCR, Symptomatic   Result Value Ref Range    Coronavirus 2019, PCR Not Detected Not Detected

## 2023-12-29 ENCOUNTER — DOCUMENTATION (OUTPATIENT)
Dept: PREADMISSION TESTING | Facility: HOSPITAL | Age: 78
End: 2023-12-29
Payer: MEDICARE

## 2023-12-29 LAB — BACTERIA UR CULT: NORMAL

## 2023-12-29 NOTE — TELEPHONE ENCOUNTER
Spoke with patient and advised of message   Pt. Is asking if you think it is ok for him  to have general anesthesia 4 days apart because he is having the procedures below 4 days apart.

## 2023-12-29 NOTE — NURSING NOTE
On 12/29/23, secure chat sent to Dr. Alaniz, Dr. Arce and PAT staff for need of medical clearance and stoppage for plavix and ASA.  Received copy of clearance from Dr. Larose with plavix stoppage 7 days prior to surgery.  Dr. Alaniz stated through secure chat that patient can continue ASA 81mg. Notified the patient who had his last ASA and plavix on 12/28/23.   Informed patient he may continue ASA 81 mg per Dr. Alaniz.  Pt understood.  Patient and myself left messages to Dr. Larose's office.  Dr. Larose's office called and states Dr. Larose wants patient to continue ASA 81mg if ok with surgeon.  Dr. Alaniz already said that was OK. Yamilet RHODES

## 2023-12-29 NOTE — PROGRESS NOTES
Speech-Language Pathology    Discharge Summary    Name: Hank Rick  MRN: [id number]  : 1945  Date: 23    Discharge Summary: SLP    Discharge Information: Date of discharge 2023, Referred by Dr. Dee, and Referred for dysphonia, respiratory strength training    Therapy Summary: Patient has participated in x4 speech therapy visits targeting increased respiratory drive and vocal stamina. He reports good speech outcomes. Recent change in vocal quality 2/2 illness. Suspect full resolve when in good health. Patient to focus on hydration/humidification. Will plan on continuing [facility] for an additional 3 weeks. Will contact out office should he need continued intervention.     Discharge Status: discharged      Rehab Discharge Reason: Achieved all and/or the most significant goals(s)

## 2024-01-02 ENCOUNTER — ANESTHESIA EVENT (OUTPATIENT)
Dept: OPERATING ROOM | Facility: HOSPITAL | Age: 79
End: 2024-01-02
Payer: MEDICARE

## 2024-01-02 ENCOUNTER — PRE-ADMISSION TESTING (OUTPATIENT)
Dept: PREADMISSION TESTING | Facility: HOSPITAL | Age: 79
End: 2024-01-02
Payer: MEDICARE

## 2024-01-02 VITALS
BODY MASS INDEX: 25.42 KG/M2 | RESPIRATION RATE: 16 BRPM | HEIGHT: 73 IN | DIASTOLIC BLOOD PRESSURE: 71 MMHG | SYSTOLIC BLOOD PRESSURE: 142 MMHG | HEART RATE: 56 BPM | TEMPERATURE: 96.6 F | OXYGEN SATURATION: 97 % | WEIGHT: 191.8 LBS

## 2024-01-02 DIAGNOSIS — Z01.818 PREOPERATIVE EXAMINATION: Primary | ICD-10-CM

## 2024-01-02 PROCEDURE — 87081 CULTURE SCREEN ONLY: CPT | Mod: TRILAB | Performed by: NURSE PRACTITIONER

## 2024-01-02 PROCEDURE — 99213 OFFICE O/P EST LOW 20 MIN: CPT | Performed by: NURSE PRACTITIONER

## 2024-01-02 RX ORDER — CHLORHEXIDINE GLUCONATE ORAL RINSE 1.2 MG/ML
SOLUTION DENTAL
Qty: 473 ML | Refills: 0 | Status: SHIPPED | OUTPATIENT
Start: 2024-01-02 | End: 2024-01-13 | Stop reason: ENTERED-IN-ERROR

## 2024-01-02 ASSESSMENT — DUKE ACTIVITY SCORE INDEX (DASI)
CAN YOU PARTICIPATE IN STRENOUS SPORTS LIKE SWIMMING, SINGLES TENNIS, FOOTBALL, BASKETBALL, OR SKIING: NO
CAN YOU DO MODERATE WORK AROUND THE HOUSE LIKE VACUUMING, SWEEPING FLOORS OR CARRYING GROCERIES: YES
CAN YOU DO HEAVY WORK AROUND THE HOUSE LIKE SCRUBBING FLOORS OR LIFTING AND MOVING HEAVY FURNITURE: YES
CAN YOU HAVE SEXUAL RELATIONS: YES
CAN YOU CLIMB A FLIGHT OF STAIRS OR WALK UP A HILL: YES
CAN YOU DO YARD WORK LIKE RAKING LEAVES, WEEDING OR PUSHING A MOWER: YES
CAN YOU PARTICIPATE IN MODERATE RECREATIONAL ACTIVITIES LIKE GOLF, BOWLING, DANCING, DOUBLES TENNIS OR THROWING A BASEBALL OR FOOTBALL: NO
CAN YOU WALK INDOORS, SUCH AS AROUND YOUR HOUSE: YES
CAN YOU TAKE CARE OF YOURSELF (EAT, DRESS, BATHE, OR USE TOILET): YES
CAN YOU RUN A SHORT DISTANCE: YES
CAN YOU WALK A BLOCK OR TWO ON LEVEL GROUND: YES
CAN YOU DO LIGHT WORK AROUND THE HOUSE LIKE DUSTING OR WASHING DISHES: YES
DASI METS SCORE: 8.2
TOTAL_SCORE: 44.7

## 2024-01-02 ASSESSMENT — PAIN - FUNCTIONAL ASSESSMENT: PAIN_FUNCTIONAL_ASSESSMENT: 0-10

## 2024-01-02 ASSESSMENT — ENCOUNTER SYMPTOMS
RESPIRATORY NEGATIVE: 1
CARDIOVASCULAR NEGATIVE: 1
GASTROINTESTINAL NEGATIVE: 1
EYES NEGATIVE: 1
ENDOCRINE NEGATIVE: 1
MUSCULOSKELETAL NEGATIVE: 1
NECK NEGATIVE: 1
NEUROLOGICAL NEGATIVE: 1
CONSTITUTIONAL NEGATIVE: 1

## 2024-01-02 ASSESSMENT — PAIN SCALES - GENERAL: PAINLEVEL_OUTOF10: 0 - NO PAIN

## 2024-01-02 NOTE — H&P (VIEW-ONLY)
CPM/PAT Evaluation       Name: Hank Rick (Hank Rick)  /Age: 1945/78 y.o.     In-Person       Chief Complaint: Obstructive sleep apnea, BMI 24.0-24.9, adult     HPI  A 78-year-old male with obstructive sleep apnea.  History significant for CVA 2023, vertebral artery stenosis, essential tremor, memory impairment, hypertension.  History of continued WALKER diagnosed several years ago with inability to tolerate CPAP.  Denies fever, chills, sore throat or dysphagia.  He is scheduled for Inspire-right    Past Medical History:   Diagnosis Date    Arthritis     Asthma     Cataract     Cerebral vascular accident (CMS/HCC) 2023    COVID-19 11/15/2022    COVID-19 virus infection    Esophageal obstruction 05/15/2022    Esophageal stricture    Essential tremor 2022    Essential tremor    GERD (gastroesophageal reflux disease)     Hard of hearing     Hearing aid worn     Hyperlipidemia     Hypertension     Memory impairment     Olecranon bursitis, left elbow 2016    Olecranon bursitis of left elbow    Personal history of diseases of the blood and blood-forming organs and certain disorders involving the immune mechanism 2022    History of anemia    Personal history of other diseases of the digestive system 2014    History of gastroesophageal reflux (GERD)    Personal history of other diseases of the digestive system 2021    History of hiatal hernia    Personal history of other specified conditions 2018    History of urinary frequency    Rash and other nonspecific skin eruption 2021    Skin rash    Salmonella enteritis     Salmonella enteritis    Sleep apnea     TIA (transient ischemic attack)     Vertebral artery stenosis        Past Surgical History:   Procedure Laterality Date    BACK SURGERY      CATARACT EXTRACTION      CT ANGIO NECK  2023    CT NECK ANGIO W AND WO IV CONTRAST 2023 [facility]    CT HEAD ANGIO W AND WO IV CONTRAST  2023    CT HEAD  ANGIO W AND WO IV CONTRAST 5/12/2023 [facility]    HERNIA REPAIR      MR HEAD ANGIO WO IV CONTRAST  05/09/2023    MR HEAD ANGIO WO IV CONTRAST 5/9/2023 [facility]    MR NECK ANGIO WO IV CONTRAST  05/09/2023    MR NECK ANGIO WO IV CONTRAST 5/9/2023 [facility]    OTHER SURGICAL HISTORY  01/07/2015    Esophagogastric Fundoplasty Laparoscopic For Paraesophageal Hernia Repair    OTHER SURGICAL HISTORY  03/05/2014    Shoulder Surgery Left    OTHER SURGICAL HISTORY      REFRACTIVE SURGERY  03/05/2014    Corneal LASIK    TONSILLECTOMY  11/18/2014    Tonsillectomy    TOTAL SHOULDER ARTHROPLASTY Right     reverse         Allergies   Allergen Reactions    Pollen Extracts Other    Gabapentin Anxiety       Current Outpatient Medications   Medication Sig Dispense Refill    acetaminophen (Tylenol 8 HOUR) 650 mg ER tablet Take 2 tablets (1,300 mg) by mouth once daily. Do not crush, chew, or split.      albuterol 90 mcg/actuation inhaler Inhale 2 puffs. Every 4 to 6 hours as needed as directed      amLODIPine (Norvasc) 2.5 mg tablet Take  2 tablets every day , please note change in dose 180 tablet 1    aspirin 81 mg EC tablet Take 1 tablet (81 mg) by mouth once daily.      atorvastatin (Lipitor) 40 mg tablet Take 1 tablet (40 mg) by mouth once daily. 90 tablet 2    azelastine (Astelin) 137 mcg (0.1 %) nasal spray Administer 2 sprays into each nostril 2 times a day. 30 mL 5    diclofenac sodium (Voltaren) 1 % gel gel Apply topically 2 times a day as needed.      fexofenadine (Allegra) 180 mg tablet Take 1 tablet (180 mg) by mouth once daily.      fluticasone (Flonase) 50 mcg/actuation nasal spray Administer 1-2 sprays into each nostril once daily.      lidocaine 4 % patch APPLY 1 PATCH TOPICALLY TO AFFECTED AREA ONCE DAILY. LEAVE IN PLACE FOR 12 HOURS, THEN REMOVE AND KEEP OFF FOR 12 HOURS. 15 patch 0    meloxicam (Mobic) 15 mg tablet Take 1 tablet (15 mg) by mouth once daily as needed for moderate pain (4 - 6). Take with food 30 tablet 0     "memantine (Namenda) 5 mg tablet Take 1 tablet (5 mg) by mouth 2 times a day. 1 po at bedtime x 4 weeks, then 1 po bid      montelukast (Singulair) 10 mg tablet Take 1 tablet (10 mg) by mouth once daily. 90 tablet 2    omeprazole OTC (PriLOSEC OTC) 20 mg EC tablet Take 1 tablet (20 mg) by mouth once daily.      propranolol (Inderal) 10 mg tablet Take 1 tablet (10 mg) by mouth 3 times a day.      propranolol LA (Inderal LA) 60 mg 24 hr capsule Take 1 capsule (60 mg) by mouth once daily. 90 capsule 1    sildenafil (Viagra) 100 mg tablet Take 0.5-1 tablets ( mg) by mouth if needed for erectile dysfunction (1 hour prior to sexual activity (max 1 per day)). 30 tablet 6    topiramate (Topamax) 25 mg tablet Take 1-2 tablets (25-50 mg) by mouth once daily at bedtime.      traZODone (Desyrel) 50 mg tablet Take 1 tablet (50 mg) by mouth once daily at bedtime. 90 tablet 2     No current facility-administered medications for this visit.       PAT ROS     PAT Physical Exam     Airway    /71   Pulse 56   Temp 35.9 °C (96.6 °F) (Temporal)   Resp 16   Ht 1.854 m (6' 1\")   Wt 87 kg (191 lb 12.8 oz)   SpO2 97%   BMI 25.30 kg/m²       Stop Bang   CHADS 2 score: 8.5%  DASI score: 12.75  METS score: 4.3  Revised cardiac risk index: 6.6%  ASA III  ARISCAT 1.6%    Labs done 12/28/2023  EKG done 8/25/2023    Assessment and Plan:     Obstructive sleep apnea, BMI 24.0-24.9, adult  Plan: Inspire-right  CVA/TIA 5/9/2023-takes Plavix-denies recurrence or residual  History of vertebral artery stenosis  Hypertension  Dyslipidemia  Essential tremor  Memory impairment  GERD        "

## 2024-01-02 NOTE — TELEPHONE ENCOUNTER
Per Dr Sánchez's instruction patient was advised to review concerns, questions, etc with anesthesiologists.

## 2024-01-02 NOTE — CPM/PAT H&P
CPM/PAT Evaluation       Name: Hank Rick (Hank Rick)  /Age: 1945/78 y.o.     In-Person       Chief Complaint: Obstructive sleep apnea, BMI 24.0-24.9, adult     HPI  A 78-year-old male with obstructive sleep apnea.  History significant for CVA 2023, vertebral artery stenosis, essential tremor, memory impairment, hypertension.  History of continued WALKER diagnosed several years ago with inability to tolerate CPAP.  Endorses daytime sleepiness. Denies fever, chills, sore throat or dysphagia.  He is scheduled for [facility]    Past Medical History:   Diagnosis Date    Arthritis     Asthma     Cataract     Cerebral vascular accident (CMS/HCC) 2023    COVID-19 11/15/2022    COVID-19 virus infection    Esophageal obstruction 05/15/2022    Esophageal stricture    Essential tremor 2022    Essential tremor    GERD (gastroesophageal reflux disease)     Hard of hearing     Hearing aid worn     Hyperlipidemia     Hypertension     Memory impairment     Olecranon bursitis, left elbow 2016    Olecranon bursitis of left elbow    Personal history of diseases of the blood and blood-forming organs and certain disorders involving the immune mechanism 2022    History of anemia    Personal history of other diseases of the digestive system 2014    History of gastroesophageal reflux (GERD)    Personal history of other diseases of the digestive system 2021    History of hiatal hernia    Personal history of other specified conditions 2018    History of urinary frequency    Rash and other nonspecific skin eruption 2021    Skin rash    Salmonella enteritis     Salmonella enteritis    Sleep apnea     TIA (transient ischemic attack)     Vertebral artery stenosis        Past Surgical History:   Procedure Laterality Date    BACK SURGERY      CATARACT EXTRACTION      CT ANGIO NECK  2023    CT NECK ANGIO W AND WO IV CONTRAST 2023 [facility]    CT HEAD ANGIO W AND WO IV  CONTRAST  05/12/2023    CT HEAD ANGIO W AND WO IV CONTRAST 5/12/2023 [facility]    HERNIA REPAIR      MR HEAD ANGIO WO IV CONTRAST  05/09/2023    MR HEAD ANGIO WO IV CONTRAST 5/9/2023 [facility]    MR NECK ANGIO WO IV CONTRAST  05/09/2023    MR NECK ANGIO WO IV CONTRAST 5/9/2023 [facility]    OTHER SURGICAL HISTORY  01/07/2015    Esophagogastric Fundoplasty Laparoscopic For Paraesophageal Hernia Repair    OTHER SURGICAL HISTORY  03/05/2014    Shoulder Surgery Left    OTHER SURGICAL HISTORY      REFRACTIVE SURGERY  03/05/2014    Corneal LASIK    TONSILLECTOMY  11/18/2014    Tonsillectomy    TOTAL SHOULDER ARTHROPLASTY Right     reverse         Allergies   Allergen Reactions    Pollen Extracts Other    Gabapentin Anxiety       Current Outpatient Medications   Medication Sig Dispense Refill    acetaminophen (Tylenol 8 HOUR) 650 mg ER tablet Take 2 tablets (1,300 mg) by mouth once daily. Do not crush, chew, or split.      albuterol 90 mcg/actuation inhaler Inhale 2 puffs. Every 4 to 6 hours as needed as directed      amLODIPine (Norvasc) 2.5 mg tablet Take  2 tablets every day , please note change in dose 180 tablet 1    aspirin 81 mg EC tablet Take 1 tablet (81 mg) by mouth once daily.      atorvastatin (Lipitor) 40 mg tablet Take 1 tablet (40 mg) by mouth once daily. 90 tablet 2    azelastine (Astelin) 137 mcg (0.1 %) nasal spray Administer 2 sprays into each nostril 2 times a day. 30 mL 5    diclofenac sodium (Voltaren) 1 % gel gel Apply topically 2 times a day as needed.      fexofenadine (Allegra) 180 mg tablet Take 1 tablet (180 mg) by mouth once daily.      fluticasone (Flonase) 50 mcg/actuation nasal spray Administer 1-2 sprays into each nostril once daily.      lidocaine 4 % patch APPLY 1 PATCH TOPICALLY TO AFFECTED AREA ONCE DAILY. LEAVE IN PLACE FOR 12 HOURS, THEN REMOVE AND KEEP OFF FOR 12 HOURS. 15 patch 0    meloxicam (Mobic) 15 mg tablet Take 1 tablet (15 mg) by mouth once daily as needed for moderate pain (4 - 6).  Take with food 30 tablet 0    memantine (Namenda) 5 mg tablet Take 1 tablet (5 mg) by mouth 2 times a day. 1 po at bedtime x 4 weeks, then 1 po bid      montelukast (Singulair) 10 mg tablet Take 1 tablet (10 mg) by mouth once daily. 90 tablet 2    omeprazole OTC (PriLOSEC OTC) 20 mg EC tablet Take 1 tablet (20 mg) by mouth once daily.      propranolol (Inderal) 10 mg tablet Take 1 tablet (10 mg) by mouth 3 times a day.      propranolol LA (Inderal LA) 60 mg 24 hr capsule Take 1 capsule (60 mg) by mouth once daily. 90 capsule 1    sildenafil (Viagra) 100 mg tablet Take 0.5-1 tablets ( mg) by mouth if needed for erectile dysfunction (1 hour prior to sexual activity (max 1 per day)). 30 tablet 6    topiramate (Topamax) 25 mg tablet Take 1-2 tablets (25-50 mg) by mouth once daily at bedtime.      traZODone (Desyrel) 50 mg tablet Take 1 tablet (50 mg) by mouth once daily at bedtime. 90 tablet 2     No current facility-administered medications for this visit.       PAT ROS:   Constitutional:   neg    Neuro/Psych:   neg    Eyes:   neg    Ears:    Bilateral hearing aids  neg    Nose:   Mouth:   neg    Throat:   Neck:   neg    Cardio:   neg    Respiratory:   neg    Endocrine:   neg    GI:   neg    :   neg    Musculoskeletal:   neg    Hematologic:   neg    Skin:  neg        Physical Exam  Vitals reviewed.   HENT:      Head: Normocephalic and atraumatic.      Ears:      Comments: Bilateral hearing aids     Mouth/Throat:      Mouth: Mucous membranes are moist.   Eyes:      Pupils: Pupils are equal, round, and reactive to light.   Cardiovascular:      Rate and Rhythm: Normal rate and regular rhythm.   Pulmonary:      Effort: Pulmonary effort is normal.      Breath sounds: Normal breath sounds.   Abdominal:      Palpations: Abdomen is soft.   Musculoskeletal:         General: Normal range of motion.      Cervical back: Normal range of motion.   Skin:     General: Skin is warm and dry.   Neurological:      Mental Status:  "He is alert and oriented to person, place, and time.   Psychiatric:         Mood and Affect: Mood normal.          PAT AIRWAY:   Airway:     Mallampati::  III    Neck ROM::  Full  normal        /71   Pulse 56   Temp 35.9 °C (96.6 °F) (Temporal)   Resp 16   Ht 1.854 m (6' 1\")   Wt 87 kg (191 lb 12.8 oz)   SpO2 97%   BMI 25.30 kg/m²       Stop Bang 3  CHADS 2 score: 8.5%  DASI score: 12.75  METS score: 4.3  Revised cardiac risk index: 6.6%  ASA III  ARISCAT 1.6%    Labs done 12/28/2023  EKG done 8/25/2023    Assessment and Plan:     Obstructive sleep apnea, BMI 24.0-24.9, adult  Plan: [facility]  CVA/TIA 5/9/2023-takes Plavix-denies recurrence or residual  History of vertebral artery stenosis  Hypertension  Dyslipidemia  Essential tremor  Memory impairment  GERD        "

## 2024-01-02 NOTE — CPM/PAT H&P
CPM/PAT Evaluation       Name: Hank Rick (Hank Rick)  /Age: 1945/78 y.o.     In-Person       Chief Complaint: Obstructive sleep apnea, BMI 24.0-24.9, adult     HPI  A 78-year-old male with obstructive sleep apnea.  History significant for CVA 2023, vertebral artery stenosis, essential tremor, memory impairment, hypertension.  History of continued WALKER diagnosed several years ago with inability to tolerate CPAP.  Denies fever, chills, sore throat or dysphagia.  He is scheduled for [facility]    Past Medical History:   Diagnosis Date    Arthritis     Asthma     Cataract     Cerebral vascular accident (CMS/HCC) 2023    COVID-19 11/15/2022    COVID-19 virus infection    Esophageal obstruction 05/15/2022    Esophageal stricture    Essential tremor 2022    Essential tremor    GERD (gastroesophageal reflux disease)     Hard of hearing     Hearing aid worn     Hyperlipidemia     Hypertension     Memory impairment     Olecranon bursitis, left elbow 2016    Olecranon bursitis of left elbow    Personal history of diseases of the blood and blood-forming organs and certain disorders involving the immune mechanism 2022    History of anemia    Personal history of other diseases of the digestive system 2014    History of gastroesophageal reflux (GERD)    Personal history of other diseases of the digestive system 2021    History of hiatal hernia    Personal history of other specified conditions 2018    History of urinary frequency    Rash and other nonspecific skin eruption 2021    Skin rash    Salmonella enteritis     Salmonella enteritis    Sleep apnea     TIA (transient ischemic attack)     Vertebral artery stenosis        Past Surgical History:   Procedure Laterality Date    BACK SURGERY      CATARACT EXTRACTION      CT ANGIO NECK  2023    CT NECK ANGIO W AND WO IV CONTRAST 2023 [facility]    CT HEAD ANGIO W AND WO IV CONTRAST  2023    CT HEAD  ANGIO W AND WO IV CONTRAST 5/12/2023 [facility]    HERNIA REPAIR      MR HEAD ANGIO WO IV CONTRAST  05/09/2023    MR HEAD ANGIO WO IV CONTRAST 5/9/2023 [facility]    MR NECK ANGIO WO IV CONTRAST  05/09/2023    MR NECK ANGIO WO IV CONTRAST 5/9/2023 [facility]    OTHER SURGICAL HISTORY  01/07/2015    Esophagogastric Fundoplasty Laparoscopic For Paraesophageal Hernia Repair    OTHER SURGICAL HISTORY  03/05/2014    Shoulder Surgery Left    OTHER SURGICAL HISTORY      REFRACTIVE SURGERY  03/05/2014    Corneal LASIK    TONSILLECTOMY  11/18/2014    Tonsillectomy    TOTAL SHOULDER ARTHROPLASTY Right     reverse         Allergies   Allergen Reactions    Pollen Extracts Other    Gabapentin Anxiety       No current facility-administered medications for this encounter.     Current Outpatient Medications   Medication Sig Dispense Refill    acetaminophen (Tylenol 8 HOUR) 650 mg ER tablet Take 2 tablets (1,300 mg) by mouth once daily. Do not crush, chew, or split.      albuterol 90 mcg/actuation inhaler Inhale 2 puffs. Every 4 to 6 hours as needed as directed      amLODIPine (Norvasc) 2.5 mg tablet Take  2 tablets every day , please note change in dose 180 tablet 1    aspirin 81 mg EC tablet Take 1 tablet (81 mg) by mouth once daily.      atorvastatin (Lipitor) 40 mg tablet Take 1 tablet (40 mg) by mouth once daily. 90 tablet 2    azelastine (Astelin) 137 mcg (0.1 %) nasal spray Administer 2 sprays into each nostril 2 times a day. 30 mL 5    diclofenac sodium (Voltaren) 1 % gel gel Apply topically 2 times a day as needed.      fexofenadine (Allegra) 180 mg tablet Take 1 tablet (180 mg) by mouth once daily.      fluticasone (Flonase) 50 mcg/actuation nasal spray Administer 1-2 sprays into each nostril once daily.      lidocaine 4 % patch APPLY 1 PATCH TOPICALLY TO AFFECTED AREA ONCE DAILY. LEAVE IN PLACE FOR 12 HOURS, THEN REMOVE AND KEEP OFF FOR 12 HOURS. 15 patch 0    meloxicam (Mobic) 15 mg tablet Take 1 tablet (15 mg) by mouth once daily  as needed for moderate pain (4 - 6). Take with food 30 tablet 0    memantine (Namenda) 5 mg tablet Take 1 tablet (5 mg) by mouth 2 times a day. 1 po at bedtime x 4 weeks, then 1 po bid      montelukast (Singulair) 10 mg tablet Take 1 tablet (10 mg) by mouth once daily. 90 tablet 2    omeprazole OTC (PriLOSEC OTC) 20 mg EC tablet Take 1 tablet (20 mg) by mouth once daily.      propranolol (Inderal) 10 mg tablet Take 1 tablet (10 mg) by mouth 3 times a day.      propranolol LA (Inderal LA) 60 mg 24 hr capsule Take 1 capsule (60 mg) by mouth once daily. 90 capsule 1    sildenafil (Viagra) 100 mg tablet Take 0.5-1 tablets ( mg) by mouth if needed for erectile dysfunction (1 hour prior to sexual activity (max 1 per day)). 30 tablet 6    topiramate (Topamax) 25 mg tablet Take 1-2 tablets (25-50 mg) by mouth once daily at bedtime.      traZODone (Desyrel) 50 mg tablet Take 1 tablet (50 mg) by mouth once daily at bedtime. 90 tablet 2       PAT ROS     PAT Physical Exam     Airway    There were no vitals taken for this visit.      Stop Bang   CHADS 2 score: 8.5%  DASI score: 12.75  METS score: 4.3  Revised cardiac risk index: 6.6%  ASA III  ARISCAT 1.6%    Labs done 12/28/2023  EKG done 8/25/2023    Assessment and Plan:     Obstructive sleep apnea, BMI 24.0-24.9, adult  Plan: [facility]  CVA/TIA 5/9/2023-takes Plavix-denies recurrence or residual  History of vertebral artery stenosis  Hypertension  Dyslipidemia  Essential tremor  Memory impairment  GERD

## 2024-01-02 NOTE — PREPROCEDURE INSTRUCTIONS
Medication List            Accurate as of January 2, 2024 10:24 AM. Always use your most recent med list.                acetaminophen 650 mg ER tablet  Commonly known as: Tylenol 8 HOUR  Medication Adjustments for Surgery: Take morning of surgery with sip of water, no other fluids     albuterol 90 mcg/actuation inhaler  Medication Adjustments for Surgery: Other (Comment)  Notes to patient: Take as needed     amLODIPine 2.5 mg tablet  Commonly known as: Norvasc  Take  2 tablets every day , please note change in dose  Medication Adjustments for Surgery: Take morning of surgery with sip of water, no other fluids     aspirin 81 mg EC tablet  Medication Adjustments for Surgery: Other (Comment)  Notes to patient: Continue per provider     atorvastatin 40 mg tablet  Commonly known as: Lipitor  Take 1 tablet (40 mg) by mouth once daily.  Notes to patient: Take evening before surgery     azelastine 137 mcg (0.1 %) nasal spray  Commonly known as: Astelin  Administer 2 sprays into each nostril 2 times a day.  Medication Adjustments for Surgery: Take morning of surgery with sip of water, no other fluids     diclofenac sodium 1 % gel gel  Commonly known as: Voltaren  Medication Adjustments for Surgery: Stop 7 days before surgery     fexofenadine 180 mg tablet  Commonly known as: Allegra  Medication Adjustments for Surgery: Take morning of surgery with sip of water, no other fluids     fluticasone 50 mcg/actuation nasal spray  Commonly known as: Flonase  Medication Adjustments for Surgery: Take morning of surgery with sip of water, no other fluids     Lidocaine Pain Relief 4 % patch  Generic drug: lidocaine  APPLY 1 PATCH TOPICALLY TO AFFECTED AREA ONCE DAILY. LEAVE IN PLACE FOR 12 HOURS, THEN REMOVE AND KEEP OFF FOR 12 HOURS.  Medication Adjustments for Surgery: Stop 1 day before surgery     meloxicam 15 mg tablet  Commonly known as: Mobic  Take 1 tablet (15 mg) by mouth once daily as needed for moderate pain (4 - 6). Take  with food  Medication Adjustments for Surgery: Stop 7 days before surgery     memantine 5 mg tablet  Commonly known as: Namenda  Notes to patient: Take evening before surgery     montelukast 10 mg tablet  Commonly known as: Singulair  Take 1 tablet (10 mg) by mouth once daily.  Notes to patient: Take evening before surgery     omeprazole OTC 20 mg EC tablet  Commonly known as: PriLOSEC OTC  Medication Adjustments for Surgery: Take morning of surgery with sip of water, no other fluids     * propranolol 10 mg tablet  Commonly known as: Inderal  Medication Adjustments for Surgery: Take morning of surgery with sip of water, no other fluids     * propranolol LA 60 mg 24 hr capsule  Commonly known as: Inderal LA  Take 1 capsule (60 mg) by mouth once daily.  Medication Adjustments for Surgery: Take morning of surgery with sip of water, no other fluids     sildenafil 100 mg tablet  Commonly known as: Viagra  Take 0.5-1 tablets ( mg) by mouth if needed for erectile dysfunction (1 hour prior to sexual activity (max 1 per day)).  Medication Adjustments for Surgery: Stop 3 days before surgery     topiramate 25 mg tablet  Commonly known as: Topamax  Notes to patient: Take evening before surgery     traZODone 50 mg tablet  Commonly known as: Desyrel  Take 1 tablet (50 mg) by mouth once daily at bedtime.  Notes to patient: Take evening before surgery           * This list has 2 medication(s) that are the same as other medications prescribed for you. Read the directions carefully, and ask your doctor or other care provider to review them with you.                         PAT DISCHARGE INSTRUCTIONS    Please call the Same Day Surgery (SDS) Department of the hospital where your procedure will be performed after 2:00 PM the day before your surgery. If you are scheduled on a Monday, or a Tuesday following a Monday holiday, you will need to call on the last business day prior to your surgery.    [facility]  [facility]  [id number] [place] [address], [id number]  [phone]    [facility]  [address] [place] [address] [phone]    [facility]  [address]  [place], [place] [id number] [phone]    Please let your surgeon know if:      You develop any open sores, shingles, burning or painful urination as these may increase your risk of an infection.   You no longer wish to have the surgery.   Any other personal circumstances change that may lead to the need to cancel or defer this surgery-such as being sick or getting admitted to any hospital within one week of your planned procedure.    Your contact details change, such as a change of address or phone number.    Starting now:     Please DO NOT drink alcohol or smoke for 24 hours before surgery. It is well known that quitting smoking can make a huge difference to your health and recovery from surgery. The longer you abstain from smoking, the better your chances of a healthy recovery. If you need help with quitting, call 8-800-QUIT-NOW to be connected to a trained counselor who will discuss the best methods to help you quit.     Before your surgery:    Please stop all supplements 7 days prior to surgery. Or as directed by your surgeon.   Please stop taking NSAID pain medicine such as Advil and Motrin 7 days before surgery.    If you develop any fever, cough, cold, rashes, cuts, scratches, scrapes, urinary symptoms or infection anywhere on your body (including teeth and gums) prior to surgery, please call your surgeon’s office as soon as possible. This may require treatment to reduce the chance of cancellation on the day of surgery.    The day before your surgery:   DIET- Do not eat any food after MIDNIGHT. May have 10 ounces of CLEAR LIQUIDS until TWO HOURS before your arrival time. This includes water, black tea or coffee (no milk ir cream), apple juice and electrolyte drinks  (Gatorade). May chew gum until TWO hours before your surgery time.   Get a good night’s rest.  Use the special soap for bathing if you have been instructed to use one.    Scheduled surgery times may change and you will be notified if this occurs - please check your personal voicemail for any updates.     On the morning of surgery:   Wear comfortable, loose fitting clothes which open in the front. Please do not wear moisturizers, creams, lotions, makeup or perfume.    Please bring with you to surgery:   Photo ID and insurance card   Current list of medicines and allergies   Pacemaker/ Defibrillator/Heart stent cards   CPAP machine and mask    Slings/ splints/ crutches   A copy of your complete advanced directive/DHPOA.    Please do NOT bring with you to surgery:   All jewelry and valuables should be left at home.   Prosthetic devices such as contact lenses, hearing aids, dentures, eyelash extensions, hairpins and body piercings must be removed prior to going in to the surgical suite.    After outpatient surgery:   A responsible adult MUST accompany you at the time of discharge and stay with you for 24 hours after your surgery. You may NOT drive yourself home after surgery.    Do not drive, operate machinery, make critical decisions or do activities that require co-ordination or balance until after a night’s sleep.   Do not drink alcoholic beverages for 24 hours.   Instructions for resuming your medications will be provided by your surgeon.    CALL YOUR DOCTOR AFTER SURGERY IF YOU HAVE:     Chills and/or a fever of 101° F or higher.    Redness, swelling, pus or drainage from your surgical wound or a bad smell from the wound.    Lightheadedness, fainting or confusion.    Persistent vomiting (throwing up) and are not able to eat or drink for 12 hours.    Three or more loose, watery bowel movements in 24 hours (diarrhea).   Difficulty or pain while urinating( after non-urological surgery)    Pain and swelling in your  legs, especially if it is only on one side.    Difficulty breathing or are breathing faster than normal.    Any new concerning symptoms.     Home Preoperative Antibacterial Shower    What is a home preoperative antibacterial shower?  This shower is a way of cleaning the skin with a germ killing solution before surgery. The solution contains chlorhexidine, commonly known as CHG. CHG is a skin cleanser with germ killing ability. Let your doctor know if you are allergic to chlorhexidine.      Why do I need to take a preoperative antibacterial shower?  Skin is not sterile. It is best to try to make your skin as free of germs as possible before surgery. Proper cleansing with a germ killing soap before surgery can lower the number of germs on your skin. This helps to reduce the risk of infection at the surgical site. Following the instructions listed below will help you prepare your skin for surgery.    How do I use the solution?      Steps: Begin using your CHG soap FIVE DAYS BEFORE your scheduled surgery on __________________________________________________.  First, wash and rinse your hair using the CHG soap. Keep CHG away soap away from ear canals and eyes.  Rinse completely, do not condition. Hair extensions should be removed.  Wash your face with your normal soap and rinse.  Apply the CHG solution to a clean wet washcloth. Turn the water off or move away from the water spray to avoid premature rinsing of the CHG soap as you are applying.  Firmly lather your entire body from neck down. Do not use on face.  Pay special attention to the areas(s) where your incision(s) will be located unless they are on your face.  Avoid scrubbing your skin too hard.  The important point is to have the CHG soap sit on your skin for 3 minutes.  DO NOT wash with regular soap after you have used the CHG soap solution.  Pat yourself dry with a clean, freshly laundered towel.  DO NOT apply powders, deodorants or lotions.  Dress in clean,  freshly laundered night clothes.  Be sure to sleep with clean, freshly laundered sheets.  Be aware that CHG will cause stains on fabrics; if you wash them with bleach after use. Rinse your washcloth and other linens that have contact with CHG completely. Use only non-chlorine detergents to launder the items used.  The morning of surgery is the fifth day. Repeat the above steps and dress in clean comfortable clothing.      Who should I call if I have any questions regarding the use of CHG soap?  Call the [facility] at [phone] if you have any questions.     Patient Information: Oral/Dental Rinse    What is oral/dental rinse?  It is a mouthwash. It is a way of cleaning the mouth with a germ killing solution before your surgery. The solution contains chlorhexidine, commonly know as CHG.  It is used inside the mouth to kill bacteria known as Staphylococcus aureus.  Let your doctor know if you are allergic to chlorhexidine.    Why do I need to use CHG oral/dental rinse?  The CHG oral/dental rinse helps to kill bacteria in your mouth known as Staphylococcus aureus. This reduces the risk of infection at the surgical site.    Using your CHG oral/dental rinse.  STEPS: use your CHG oral/dental rinse after you brush your teeth the night before (at bedtime) and the morning of your surgery. Follow the directions on your prescription label.  Use the cap on the container to measure 15ml (fill cap to fill line)  Swish (gargle if you can) the mouthwash in your mouth for at least 30 seconds, (do not swallow) spit out.  After you use your CHG rinse, do not rinse your mouth with water, drink or eat. Please refer to prescription label for the appropriate time to resume oral intake.    What side effects might I have using the CHG oral/dental rinse?  CHG rinse will stick to the plaque on the teeth. Brush and floss just before use. Teeth brushing will help avoid  staining of plaque during use.    Who should I contact if I have questions about the CHG oral/dental rinse?  Please call [facility], [facility] at [phone] if you have any questions. Patient Information: Oral/Dental Rinse    What is oral/dental rinse?  It is a mouthwash. It is a way of cleaning the mouth with a germ killing solution before your surgery. The solution contains chlorhexidine, commonly know as CHG.  It is used inside the mouth to kill bacteria known as Staphylococcus aureus.  Let your doctor know if you are allergic to chlorhexidine.    Why do I need to use CHG oral/dental rinse?  The CHG oral/dental rinse helps to kill bacteria in your mouth known as Staphylococcus aureus. This reduces the risk of infection at the surgical site.    Using your CHG oral/dental rinse.  STEPS: use your CHG oral/dental rinse after you brush your teeth the night before (at bedtime) and the morning of your surgery. Follow the directions on your prescription label.  Use the cap on the container to measure 15ml (fill cap to fill line)  Swish (gargle if you can) the mouthwash in your mouth for at least 30 seconds, (do not swallow) spit out.  After you use your CHG rinse, do not rinse your mouth with water, drink or eat. Please refer to prescription label for the appropriate time to resume oral intake.    What side effects might I have using the CHG oral/dental rinse?  CHG rinse will stick to the plaque on the teeth. Brush and floss just before use. Teeth brushing will help avoid staining of plaque during use.    Who should I contact if I have questions about the CHG oral/dental rinse?  Please call [facility], [facility] at [phone] if you have any questions.

## 2024-01-03 DIAGNOSIS — R41.3 MEMORY LOSS: Primary | ICD-10-CM

## 2024-01-03 RX ORDER — MEMANTINE HYDROCHLORIDE 5 MG/1
5 TABLET ORAL 2 TIMES DAILY
Qty: 180 TABLET | Refills: 0 | Status: SHIPPED | OUTPATIENT
Start: 2024-01-03 | End: 2024-04-01

## 2024-01-03 NOTE — CPM/PAT H&P
Patient scheduled for incision in tranurethral bladder neck and botox injection 100units  Per cardiology Dr. Larose his is to stop plavix 7 days prior to procedure and to continue asa.  Last dose of plavix was 12/27/23

## 2024-01-04 ENCOUNTER — HOSPITAL ENCOUNTER (OUTPATIENT)
Facility: HOSPITAL | Age: 79
Setting detail: OUTPATIENT SURGERY
Discharge: HOME | End: 2024-01-04
Attending: UROLOGY | Admitting: UROLOGY
Payer: MEDICARE

## 2024-01-04 ENCOUNTER — ANESTHESIA (OUTPATIENT)
Dept: OPERATING ROOM | Facility: HOSPITAL | Age: 79
End: 2024-01-04
Payer: MEDICARE

## 2024-01-04 VITALS
OXYGEN SATURATION: 95 % | DIASTOLIC BLOOD PRESSURE: 85 MMHG | BODY MASS INDEX: 24.81 KG/M2 | HEART RATE: 65 BPM | WEIGHT: 187.17 LBS | TEMPERATURE: 97.7 F | HEIGHT: 73 IN | SYSTOLIC BLOOD PRESSURE: 151 MMHG | RESPIRATION RATE: 18 BRPM

## 2024-01-04 DIAGNOSIS — M25.562 CHRONIC PAIN OF LEFT KNEE: ICD-10-CM

## 2024-01-04 DIAGNOSIS — M17.12 PRIMARY OSTEOARTHRITIS OF LEFT KNEE: ICD-10-CM

## 2024-01-04 DIAGNOSIS — R39.15 BENIGN PROSTATIC HYPERPLASIA (BPH) WITH URINARY URGENCY: Primary | ICD-10-CM

## 2024-01-04 DIAGNOSIS — N40.1 BENIGN PROSTATIC HYPERPLASIA (BPH) WITH URINARY URGENCY: Primary | ICD-10-CM

## 2024-01-04 DIAGNOSIS — G89.29 CHRONIC PAIN OF LEFT KNEE: ICD-10-CM

## 2024-01-04 LAB — STAPHYLOCOCCUS SPEC CULT: NORMAL

## 2024-01-04 PROCEDURE — 7100000002 HC RECOVERY ROOM TIME - EACH INCREMENTAL 1 MINUTE: Performed by: UROLOGY

## 2024-01-04 PROCEDURE — 7100000001 HC RECOVERY ROOM TIME - INITIAL BASE CHARGE: Performed by: UROLOGY

## 2024-01-04 PROCEDURE — 3600000003 HC OR TIME - INITIAL BASE CHARGE - PROCEDURE LEVEL THREE: Performed by: UROLOGY

## 2024-01-04 PROCEDURE — 52648 LASER SURGERY OF PROSTATE: CPT | Performed by: UROLOGY

## 2024-01-04 PROCEDURE — 2720000007 HC OR 272 NO HCPCS: Performed by: UROLOGY

## 2024-01-04 PROCEDURE — 52287 CYSTOSCOPY CHEMODENERVATION: CPT | Performed by: UROLOGY

## 2024-01-04 PROCEDURE — A52276 PR CYSTOSCOPY,DIR VIS INT URETHROTOMY: Performed by: NURSE ANESTHETIST, CERTIFIED REGISTERED

## 2024-01-04 PROCEDURE — 2500000004 HC RX 250 GENERAL PHARMACY W/ HCPCS (ALT 636 FOR OP/ED): Performed by: UROLOGY

## 2024-01-04 PROCEDURE — 3600000008 HC OR TIME - EACH INCREMENTAL 1 MINUTE - PROCEDURE LEVEL THREE: Performed by: UROLOGY

## 2024-01-04 PROCEDURE — 7100000009 HC PHASE TWO TIME - INITIAL BASE CHARGE: Performed by: UROLOGY

## 2024-01-04 PROCEDURE — 99100 ANES PT EXTEME AGE<1 YR&>70: CPT | Performed by: ANESTHESIOLOGY

## 2024-01-04 PROCEDURE — 3700000002 HC GENERAL ANESTHESIA TIME - EACH INCREMENTAL 1 MINUTE: Performed by: UROLOGY

## 2024-01-04 PROCEDURE — 2500000004 HC RX 250 GENERAL PHARMACY W/ HCPCS (ALT 636 FOR OP/ED): Performed by: NURSE ANESTHETIST, CERTIFIED REGISTERED

## 2024-01-04 PROCEDURE — C1758 CATHETER, URETERAL: HCPCS | Performed by: UROLOGY

## 2024-01-04 PROCEDURE — 3700000001 HC GENERAL ANESTHESIA TIME - INITIAL BASE CHARGE: Performed by: UROLOGY

## 2024-01-04 PROCEDURE — A52276 PR CYSTOSCOPY,DIR VIS INT URETHROTOMY: Performed by: ANESTHESIOLOGY

## 2024-01-04 PROCEDURE — 7100000010 HC PHASE TWO TIME - EACH INCREMENTAL 1 MINUTE: Performed by: UROLOGY

## 2024-01-04 PROCEDURE — 2500000004 HC RX 250 GENERAL PHARMACY W/ HCPCS (ALT 636 FOR OP/ED): Mod: JZ | Performed by: UROLOGY

## 2024-01-04 RX ORDER — CEFAZOLIN SODIUM 2 G/100ML
2 INJECTION, SOLUTION INTRAVENOUS ONCE
Status: COMPLETED | OUTPATIENT
Start: 2024-01-04 | End: 2024-01-04

## 2024-01-04 RX ORDER — SODIUM CHLORIDE, SODIUM LACTATE, POTASSIUM CHLORIDE, CALCIUM CHLORIDE 600; 310; 30; 20 MG/100ML; MG/100ML; MG/100ML; MG/100ML
100 INJECTION, SOLUTION INTRAVENOUS CONTINUOUS
Status: DISCONTINUED | OUTPATIENT
Start: 2024-01-04 | End: 2024-01-04 | Stop reason: HOSPADM

## 2024-01-04 RX ORDER — ONDANSETRON HYDROCHLORIDE 2 MG/ML
INJECTION, SOLUTION INTRAVENOUS AS NEEDED
Status: DISCONTINUED | OUTPATIENT
Start: 2024-01-04 | End: 2024-01-04

## 2024-01-04 RX ORDER — GLYCOPYRROLATE 0.2 MG/ML
INJECTION INTRAMUSCULAR; INTRAVENOUS AS NEEDED
Status: DISCONTINUED | OUTPATIENT
Start: 2024-01-04 | End: 2024-01-04

## 2024-01-04 RX ORDER — PHENAZOPYRIDINE HYDROCHLORIDE 100 MG/1
100 TABLET, FILM COATED ORAL 3 TIMES DAILY
Qty: 42 TABLET | Refills: 0 | Status: SHIPPED | OUTPATIENT
Start: 2024-01-04 | End: 2024-01-13 | Stop reason: ENTERED-IN-ERROR

## 2024-01-04 RX ORDER — HYDRALAZINE HYDROCHLORIDE 20 MG/ML
5 INJECTION INTRAMUSCULAR; INTRAVENOUS EVERY 30 MIN PRN
Status: DISCONTINUED | OUTPATIENT
Start: 2024-01-04 | End: 2024-01-04 | Stop reason: HOSPADM

## 2024-01-04 RX ORDER — FENTANYL CITRATE 50 UG/ML
INJECTION, SOLUTION INTRAMUSCULAR; INTRAVENOUS AS NEEDED
Status: DISCONTINUED | OUTPATIENT
Start: 2024-01-04 | End: 2024-01-04

## 2024-01-04 RX ORDER — FENTANYL CITRATE 50 UG/ML
50 INJECTION, SOLUTION INTRAMUSCULAR; INTRAVENOUS EVERY 5 MIN PRN
Status: DISCONTINUED | OUTPATIENT
Start: 2024-01-04 | End: 2024-01-04 | Stop reason: HOSPADM

## 2024-01-04 RX ORDER — ALBUTEROL SULFATE 0.83 MG/ML
2.5 SOLUTION RESPIRATORY (INHALATION) ONCE AS NEEDED
Status: DISCONTINUED | OUTPATIENT
Start: 2024-01-04 | End: 2024-01-04 | Stop reason: HOSPADM

## 2024-01-04 RX ORDER — ONDANSETRON HYDROCHLORIDE 2 MG/ML
4 INJECTION, SOLUTION INTRAVENOUS ONCE AS NEEDED
Status: DISCONTINUED | OUTPATIENT
Start: 2024-01-04 | End: 2024-01-04 | Stop reason: HOSPADM

## 2024-01-04 RX ORDER — PROPOFOL 10 MG/ML
INJECTION, EMULSION INTRAVENOUS AS NEEDED
Status: DISCONTINUED | OUTPATIENT
Start: 2024-01-04 | End: 2024-01-04

## 2024-01-04 RX ADMIN — ONDANSETRON 4 MG: 2 INJECTION INTRAMUSCULAR; INTRAVENOUS at 12:58

## 2024-01-04 RX ADMIN — SODIUM CHLORIDE, SODIUM LACTATE, POTASSIUM CHLORIDE, AND CALCIUM CHLORIDE 100 ML/HR: 600; 310; 30; 20 INJECTION, SOLUTION INTRAVENOUS at 10:34

## 2024-01-04 RX ADMIN — GLYCOPYRROLATE 0.2 MG: 0.2 INJECTION INTRAMUSCULAR; INTRAVENOUS at 13:03

## 2024-01-04 RX ADMIN — PROPOFOL 100 MG: 10 INJECTION, EMULSION INTRAVENOUS at 13:12

## 2024-01-04 RX ADMIN — PROPOFOL 200 MG: 10 INJECTION, EMULSION INTRAVENOUS at 12:45

## 2024-01-04 RX ADMIN — FENTANYL CITRATE 100 MCG: 50 INJECTION INTRAMUSCULAR; INTRAVENOUS at 12:43

## 2024-01-04 RX ADMIN — CEFAZOLIN SODIUM 2 G: 2 INJECTION, SOLUTION INTRAVENOUS at 12:42

## 2024-01-04 RX ADMIN — PROPOFOL 100 MG: 10 INJECTION, EMULSION INTRAVENOUS at 13:16

## 2024-01-04 SDOH — HEALTH STABILITY: MENTAL HEALTH: CURRENT SMOKER: 0

## 2024-01-04 ASSESSMENT — PAIN - FUNCTIONAL ASSESSMENT
PAIN_FUNCTIONAL_ASSESSMENT: 0-10

## 2024-01-04 ASSESSMENT — COLUMBIA-SUICIDE SEVERITY RATING SCALE - C-SSRS
2. HAVE YOU ACTUALLY HAD ANY THOUGHTS OF KILLING YOURSELF?: NO
6. HAVE YOU EVER DONE ANYTHING, STARTED TO DO ANYTHING, OR PREPARED TO DO ANYTHING TO END YOUR LIFE?: NO
1. IN THE PAST MONTH, HAVE YOU WISHED YOU WERE DEAD OR WISHED YOU COULD GO TO SLEEP AND NOT WAKE UP?: NO

## 2024-01-04 ASSESSMENT — PAIN SCALES - GENERAL
PAINLEVEL_OUTOF10: 0 - NO PAIN
PAINLEVEL_OUTOF10: 0 - NO PAIN
PAINLEVEL_OUTOF10: 4
PAINLEVEL_OUTOF10: 0 - NO PAIN
PAIN_LEVEL: 0
PAINLEVEL_OUTOF10: 0 - NO PAIN
PAINLEVEL_OUTOF10: 0 - NO PAIN

## 2024-01-04 NOTE — ANESTHESIA POSTPROCEDURE EVALUATION
Patient: Hank Rick    Procedure Summary       Date: 01/04/24 Room / Location: [facility] 02 / [facility]    Anesthesia Start: 1242 Anesthesia Stop: 1335    Procedures:       Botox 100 units ()      Incision Transurethral Bladder Neck Diagnosis:       Benign prostatic hyperplasia (BPH) with urinary urgency      Urge incontinence of urine      (Benign prostatic hyperplasia (BPH) with urinary urgency [N40.1, R39.15])      (Urge incontinence of urine [N39.41])    Surgeons: Olga Alaniz MD Responsible Provider: Fei Hardwick MD    Anesthesia Type: general ASA Status: 3            Anesthesia Type: general    Vitals Value Taken Time   /92 01/04/24 1346   Temp 36.7 °C (98.1 °F) 01/04/24 1331   Pulse 68 01/04/24 1346   Resp 16 01/04/24 1346   SpO2 93 % 01/04/24 1346       Anesthesia Post Evaluation    Patient location during evaluation: bedside  Patient participation: complete - patient participated  Level of consciousness: responsive to verbal stimuli  Pain score: 0  Pain management: adequate  Multimodal analgesia pain management approach  Airway patency: patent  Cardiovascular status: acceptable  Respiratory status: acceptable  Hydration status: acceptable  Postoperative Nausea and Vomiting: none  Comments: PT HEMODYNAMICALLY STABLE, NO PONV, AWAKE, ALERT AND ORIENTATED TIMES THREE        There were no known notable events for this encounter.

## 2024-01-04 NOTE — DISCHARGE INSTRUCTIONS
POST-ENDOSCOPIC (CYSTOSCOPY) PROCEDURE  (WITH CATHETER)     You have just had a surgery. The healing process takes time.  Below are some care instructions to help you recover after you leave the hospital.     Call our office immediately if you develop:  Chills or a temperature of 101° or higher.  Nausea, vomiting and/or severe abdominal pain  Pain or swelling in your legs  Pain that is not relieved by medicine   A Villalba catheter stops draining, becomes bloody or you feel your bladder is full but cannot empty            If you have severe chest pain or sudden shortness of breath, call 911 immediately.          Important Phone Numbers:     Monday - Friday 8:30am - 5:00pm call: [phone]  All other times call: [phone], ask to speak to physician on call   Alternatively, you may send a message to your provider via [employer] that will be answered during business hours   Pain:  After most endoscopic procedures, you will be instructed to take over-the-counter acetaminophen (Tylenol®) or NSAIDS (like Motrin®, Ibuprofen, Aleve®, etc.). Please check with your doctor first to be sure it is safe for you to take medicines called NSAIDS.   Some prescription pain medicines have acetaminophen (Tylenol®) in them. You should not take acetaminophen (Tylenol®) with these medicines.  Limit the total amount of acetaminophen you take to 3000 mg in 24 hours.  In some rare instances, you may be prescribed an opioid pain medication. Please take the medicine as prescribed and only as needed.   Opioid medicine can affect your judgment and reflexes. Do not drive or use machinery while taking this medicine.   Opioid medicine often causes constipation (trouble moving your bowels).  Please take the stool softeners that we prescribed for you.  If you do become constipated, drink more fluids and take an over the counter stool softener along with the ones prescribed  Some over-the-counter stool softeners/laxatives include  MiraLax®, Colace®, Senokot®, or Milk of magnesia®.  Follow the instructions on the box/container to make sure you take these medicines the right way.   With constipation, you may also have nausea, vomiting and/or severe abdominal pain. If this is the case, do not take the stool softener and call your doctor's team right away.   Please call us if you have pain that is not relieved by medicine.      Diet:  You may eat your normal diet. Eating a well-balanced diet will help you heal.  If your appetite is decreased, you should try eating small, meals often throughout the day.  It is important to stay well hydrated. Drink at least 8-10 glasses of fluids a day, unless you are told otherwise by your doctor.  If you are not able to meet your nutrition goals, you can call [phone] to schedule an appointment with a [facility] dietitian.       Activity:     You may carefully and slowly walk up and down stairs when you return home. Make sure you have help if you feel unstable on your feet.  Do not do any strenuous activities or heavy lifting until you can do these without any pain.  Most people typically return to normal activity in ~2 weeks.  Do not drive a car or use heavy machinery until at the very least 24 hours have passed from surgery, you are pain free, and you are not using narcotic-containing pain medicine.     Urethral (Villalba) Catheter     You may be discharged home with a catheter that drains your bladder.  The length of time that the catheter stays in depends on the procedure you had.  Your doctor will tell you about how long the catheter will need to stay in place.  Your nurse will teach you how to care for your catheter before you go home.  Refer to the Urinary Catheter and Drainage Bag Care Patient Information Sheet for more information     Bathing:     You may shower.  Do not tub-bathe or soak if you have a catheter in place until your catheter has been removed.

## 2024-01-04 NOTE — ANESTHESIA PREPROCEDURE EVALUATION
Patient: Hank Rick    Procedure Information       Date/Time: 01/04/24 1130    Procedures:       Botox 100 units () - Botox 100 U      Incision Transurethral Bladder Neck    Location: [facility] 02 / [facility]    Surgeons: Olga Alaniz MD            Relevant Problems   Cardiovascular   (+) Abnormal EKG   (+) Hyperlipidemia   (+) Primary hypertension   (+) Thoracic back pain      Endocrine   (+) Hyponatremia      GI   (+) Esophageal stricture   (+) GERD (gastroesophageal reflux disease)      Neuro/Psych   (+) Cerebrovascular accident (CVA) (CMS/HCC)   (+) Cervical radiculitis   (+) Cubital tunnel syndrome, right   (+) Lumbar radiculopathy   (+) Mixed Alzheimer's and vascular dementia (CMS/HCC)      Pulmonary   (+) Asthma   (+) WALKER (obstructive sleep apnea)   (+) Obstructive sleep apnea      Hematology   (+) Anemia, unspecified      Musculoskeletal   (+) Degenerative lumbar spinal stenosis   (+) Lumbar canal stenosis   (+) Lumbar scoliosis   (+) Lumbar stenosis with neurogenic claudication   (+) Myofascial pain syndrome, cervical   (+) Primary osteoarthritis of both knees   (+) Primary osteoarthritis of left knee   (+) Primary osteoarthritis of right knee      Eyes, Ears, Nose, and Throat   (+) [place] (hard of hearing)      Infectious Disease   (+) Erythrasma     Past Surgical History:   Procedure Laterality Date    BACK SURGERY      CATARACT EXTRACTION      CT ANGIO NECK  05/12/2023    CT NECK ANGIO W AND WO IV CONTRAST 5/12/2023 U CT    CT HEAD ANGIO W AND WO IV CONTRAST  05/12/2023    CT HEAD ANGIO W AND WO IV CONTRAST 5/12/2023 U CT    HERNIA REPAIR      MR HEAD ANGIO WO IV CONTRAST  05/09/2023    MR HEAD ANGIO WO IV CONTRAST 5/9/2023 [facility]    MR NECK ANGIO WO IV CONTRAST  05/09/2023    MR NECK ANGIO WO IV CONTRAST 5/9/2023 [facility]    OTHER SURGICAL HISTORY  01/07/2015    Esophagogastric Fundoplasty Laparoscopic For Paraesophageal Hernia Repair    OTHER SURGICAL HISTORY  03/05/2014    Shoulder  Surgery Left    OTHER SURGICAL HISTORY      REFRACTIVE SURGERY  03/05/2014    Corneal LASIK    TONSILLECTOMY  11/18/2014    Tonsillectomy    TOTAL SHOULDER ARTHROPLASTY Right     reverse       Clinical information reviewed:   Tobacco  Allergies  Meds   Med Hx  Surg Hx   Fam Hx  Soc Hx        NPO Detail:  NPO/Void Status  Date of Last Liquid: 01/03/24  Time of Last Liquid: 2000  Date of Last Solid: 01/03/24  Time of Last Solid: 1900  Last Intake Type: Clear fluids  Time of Last Void: 1000         Physical Exam    Airway  Mallampati: II  TM distance: >3 FB  Neck ROM: full     Cardiovascular   Comments: deferred   Dental    Pulmonary   Comments: deferred   Abdominal     Comments: deferred           Anesthesia Plan    ASA 3     general     The patient is not a current smoker.    intravenous induction   Postoperative administration of opioids is intended.  Anesthetic plan and risks discussed with patient.    Plan discussed with CRNA.

## 2024-01-04 NOTE — ANESTHESIA PROCEDURE NOTES
Airway  Date/Time: 1/4/2024 12:46 PM  Urgency: elective    Airway not difficult    Staffing  Performed: CRNA   Authorized by: Fei Hardwick MD    Performed by: RAFAEL Baugh-HONG  Patient location during procedure: OR    Indications and Patient Condition  Indications for airway management: anesthesia and airway protection  Spontaneous ventilation: present  Sedation level: deep  Preoxygenated: yes  Patient position: sniffing  MILS maintained throughout  Mask difficulty assessment: 1 - vent by mask    Final Airway Details  Final airway type: supraglottic airway      Successful airway: classic  Size 4     Number of attempts at approach: 1  Number of other approaches attempted: 0    Additional Comments  LMA lubricated. Atraumatic insertion.

## 2024-01-04 NOTE — OP NOTE
Botox 100 units (), Incision Transurethral Bladder Neck Operative Note     Date: 2024  OR Location: [facility]    Name: Hank Rick, : 1945, Age: 78 y.o., MRN: [id number], Sex: male    Diagnosis  Pre-op Diagnosis     * Benign prostatic hyperplasia (BPH) with urinary urgency [N40.1, R39.15]     * Urge incontinence of urine [N39.41] Post-op Diagnosis     * Benign prostatic hyperplasia (BPH) with urinary urgency [N40.1, R39.15]     * Urge incontinence of urine [N39.41]     Procedures  Botox 100 units ()  20226 - [place] CYSTOURETHROSCOPY INJ CHEMODENERVATION BLADDER    Incision Transurethral Bladder Neck  25656 - [place] TRURL RESCJ POSTOP BLADDER NECK CONTRACTURE      Surgeons      * Olga Alaniz - Primary    Resident/Fellow/Other Assistant:  Surgeon(s) and Role:    Procedure Summary  Anesthesia: General  ASA: III  Anesthesia Staff: Anesthesiologist: Fei Hardwick MD  CRNA: RAFAEL Baugh-CRNA  Estimated Blood Loss: 5mL  Intra-op Medications: * No intraprocedure medications in log *           Anesthesia Record               Intraprocedure I/O Totals       None           Specimen: No specimens collected     Staff:   Circulator: Breanna Rojas RN  Scrub Person: Guerline Steele RN         Drains and/or Catheters:   Urethral Catheter Coude 22 Fr. (Active)       Tourniquet Times:         Implants:     Findings: Anterior nodule protruding into the prostatic fossa that is obstructing    Indications: Hank Rick is an 78 y.o. male who is having surgery for Benign prostatic hyperplasia (BPH) with urinary urgency [N40.1, R39.15]  Urge incontinence of urine [N39.41].     The patient was seen in the preoperative area. The risks, benefits, complications, treatment options, non-operative alternatives, expected recovery and outcomes were discussed with the patient. The possibilities of reaction to medication, pulmonary aspiration, injury to surrounding structures, bleeding, recurrent infection, the need  for additional procedures, failure to diagnose a condition, and creating a complication requiring transfusion or operation were discussed with the patient. The patient concurred with the proposed plan, giving informed consent.  The site of surgery was properly noted/marked if necessary per policy. The patient has been actively warmed in preoperative area. Preoperative antibiotics have been ordered and given within 1 hours of incision. Venous thrombosis prophylaxis have been ordered including bilateral sequential compression devices    Procedure Details: Patient was consented in the preoperative area. Risks and benefits of the procedure were discussed.   Patient was brought to the operating room and placed in the supine position on the OR table. A timeout was performed and all were in agreement. Preoperative antibiotics were administered. Patient underwent anesthesia without complication. Pt was repositioned in dorsal lithotomy then prepped and draped in the usual sterile fashion.   A 21 Fr rigid cystoscope was used to perform pan-cystoscopy. No bladder masses or lesions were identified.  There was a prostatic fossa status post holmium laser nucleation of prostate with an obstructing nodule protruding from anterior to right lateral.  UOs were noted in normal orthotopic position.   Cystoscopic injection needle was set to 3mm depth. 100 units of botox were diluted into 10mL of normal saline. Patient received 20 x 0.5mL intravesical botox injections in a grid-like pattern, approximately 1 cm apart. Visible blood vessels were avoided.     Holmium laser was then inserted and ablation of the obstructing tissue was performed until fossa appeared to be completely patent.  Hemostasis was excellent.  Cystoscopy was performed again to ensure hemostasis.  22 Swiss coudé Villalba catheter was inserted   Disposition: PACU - hemodynamically stable.  Condition: stable         Additional Details: na    Attending Attestation: I was  present and scrubbed for the entire procedure.    Olga Alaniz  Phone Number: [phone]

## 2024-01-05 ASSESSMENT — PAIN SCALES - GENERAL: PAINLEVEL_OUTOF10: 0 - NO PAIN

## 2024-01-08 ENCOUNTER — PHARMACY VISIT (OUTPATIENT)
Dept: PHARMACY | Facility: CLINIC | Age: 79
End: 2024-01-08
Payer: COMMERCIAL

## 2024-01-08 ENCOUNTER — APPOINTMENT (OUTPATIENT)
Dept: RADIOLOGY | Facility: HOSPITAL | Age: 79
End: 2024-01-08
Payer: MEDICARE

## 2024-01-08 ENCOUNTER — ANESTHESIA (OUTPATIENT)
Dept: OPERATING ROOM | Facility: HOSPITAL | Age: 79
End: 2024-01-08
Payer: MEDICARE

## 2024-01-08 ENCOUNTER — ANESTHESIA EVENT (OUTPATIENT)
Dept: OPERATING ROOM | Facility: HOSPITAL | Age: 79
End: 2024-01-08
Payer: MEDICARE

## 2024-01-08 ENCOUNTER — HOSPITAL ENCOUNTER (OUTPATIENT)
Facility: HOSPITAL | Age: 79
Setting detail: OUTPATIENT SURGERY
Discharge: HOME | End: 2024-01-08
Attending: OTOLARYNGOLOGY | Admitting: OTOLARYNGOLOGY
Payer: MEDICARE

## 2024-01-08 VITALS
HEART RATE: 51 BPM | DIASTOLIC BLOOD PRESSURE: 69 MMHG | SYSTOLIC BLOOD PRESSURE: 135 MMHG | RESPIRATION RATE: 16 BRPM | OXYGEN SATURATION: 92 % | TEMPERATURE: 97.9 F

## 2024-01-08 DIAGNOSIS — G89.18 POST-OPERATIVE PAIN: ICD-10-CM

## 2024-01-08 DIAGNOSIS — G47.33 OBSTRUCTIVE SLEEP APNEA: Primary | ICD-10-CM

## 2024-01-08 DIAGNOSIS — G47.33 OBSTRUCTIVE SLEEP APNEA SYNDROME: ICD-10-CM

## 2024-01-08 LAB
ANION GAP SERPL CALC-SCNC: 9 MMOL/L
BUN SERPL-MCNC: 16 MG/DL (ref 8–25)
CALCIUM SERPL-MCNC: 9 MG/DL (ref 8.5–10.4)
CHLORIDE SERPL-SCNC: 99 MMOL/L (ref 97–107)
CO2 SERPL-SCNC: 26 MMOL/L (ref 24–31)
CREAT SERPL-MCNC: 1 MG/DL (ref 0.4–1.6)
GFR SERPL CREATININE-BSD FRML MDRD: 77 ML/MIN/1.73M*2
GLUCOSE SERPL-MCNC: 118 MG/DL (ref 65–99)
POTASSIUM SERPL-SCNC: 4 MMOL/L (ref 3.4–5.1)
SODIUM SERPL-SCNC: 134 MMOL/L (ref 133–145)

## 2024-01-08 PROCEDURE — 3600000009 HC OR TIME - EACH INCREMENTAL 1 MINUTE - PROCEDURE LEVEL FOUR: Performed by: OTOLARYNGOLOGY

## 2024-01-08 PROCEDURE — A64582 PR OPEN IMPLTJ HPGLSL NRV NSTIM RA PG AND RESPIR SENSOR: Performed by: NURSE ANESTHETIST, CERTIFIED REGISTERED

## 2024-01-08 PROCEDURE — RXMED WILLOW AMBULATORY MEDICATION CHARGE

## 2024-01-08 PROCEDURE — C1767 GENERATOR, NEURO NON-RECHARG: HCPCS | Performed by: OTOLARYNGOLOGY

## 2024-01-08 PROCEDURE — 2720000007 HC OR 272 NO HCPCS: Performed by: OTOLARYNGOLOGY

## 2024-01-08 PROCEDURE — 2500000004 HC RX 250 GENERAL PHARMACY W/ HCPCS (ALT 636 FOR OP/ED): Performed by: OTOLARYNGOLOGY

## 2024-01-08 PROCEDURE — A64582 PR OPEN IMPLTJ HPGLSL NRV NSTIM RA PG AND RESPIR SENSOR: Performed by: ANESTHESIOLOGY

## 2024-01-08 PROCEDURE — 3700000002 HC GENERAL ANESTHESIA TIME - EACH INCREMENTAL 1 MINUTE: Performed by: OTOLARYNGOLOGY

## 2024-01-08 PROCEDURE — 2500000005 HC RX 250 GENERAL PHARMACY W/O HCPCS: Performed by: NURSE ANESTHETIST, CERTIFIED REGISTERED

## 2024-01-08 PROCEDURE — 70360 X-RAY EXAM OF NECK: CPT

## 2024-01-08 PROCEDURE — 80048 BASIC METABOLIC PNL TOTAL CA: CPT | Performed by: ANESTHESIOLOGY

## 2024-01-08 PROCEDURE — 2500000005 HC RX 250 GENERAL PHARMACY W/O HCPCS: Performed by: OTOLARYNGOLOGY

## 2024-01-08 PROCEDURE — 99100 ANES PT EXTEME AGE<1 YR&>70: CPT | Performed by: ANESTHESIOLOGY

## 2024-01-08 PROCEDURE — 7100000001 HC RECOVERY ROOM TIME - INITIAL BASE CHARGE: Performed by: OTOLARYNGOLOGY

## 2024-01-08 PROCEDURE — 94760 N-INVAS EAR/PLS OXIMETRY 1: CPT

## 2024-01-08 PROCEDURE — 3600000004 HC OR TIME - INITIAL BASE CHARGE - PROCEDURE LEVEL FOUR: Performed by: OTOLARYNGOLOGY

## 2024-01-08 PROCEDURE — 3700000001 HC GENERAL ANESTHESIA TIME - INITIAL BASE CHARGE: Performed by: OTOLARYNGOLOGY

## 2024-01-08 PROCEDURE — 64582 OPN MPLTJ HPGLSL NSTM ARY PG: CPT | Performed by: OTOLARYNGOLOGY

## 2024-01-08 PROCEDURE — A4649 SURGICAL SUPPLIES: HCPCS | Performed by: OTOLARYNGOLOGY

## 2024-01-08 PROCEDURE — 7100000002 HC RECOVERY ROOM TIME - EACH INCREMENTAL 1 MINUTE: Performed by: OTOLARYNGOLOGY

## 2024-01-08 PROCEDURE — 71045 X-RAY EXAM CHEST 1 VIEW: CPT

## 2024-01-08 PROCEDURE — 2500000004 HC RX 250 GENERAL PHARMACY W/ HCPCS (ALT 636 FOR OP/ED): Performed by: NURSE ANESTHETIST, CERTIFIED REGISTERED

## 2024-01-08 PROCEDURE — 7100000010 HC PHASE TWO TIME - EACH INCREMENTAL 1 MINUTE: Performed by: OTOLARYNGOLOGY

## 2024-01-08 PROCEDURE — 2780000003 HC OR 278 NO HCPCS: Performed by: OTOLARYNGOLOGY

## 2024-01-08 PROCEDURE — 7100000009 HC PHASE TWO TIME - INITIAL BASE CHARGE: Performed by: OTOLARYNGOLOGY

## 2024-01-08 DEVICE — LEAD, SENSING INSPIRE, 4340: Type: IMPLANTABLE DEVICE | Site: CHEST  WALL | Status: FUNCTIONAL

## 2024-01-08 DEVICE — STIMULATOR, UPPER AIRWAY INSPIRE: Type: IMPLANTABLE DEVICE | Site: CHEST  WALL | Status: FUNCTIONAL

## 2024-01-08 DEVICE — LEAD, STIMULATOR INSPIRE: Type: IMPLANTABLE DEVICE | Site: NECK | Status: FUNCTIONAL

## 2024-01-08 RX ORDER — CEFAZOLIN SODIUM 2 G/100ML
2 INJECTION, SOLUTION INTRAVENOUS ONCE
Status: COMPLETED | OUTPATIENT
Start: 2024-01-08 | End: 2024-01-08

## 2024-01-08 RX ORDER — ONDANSETRON 4 MG/1
4 TABLET, ORALLY DISINTEGRATING ORAL EVERY 6 HOURS
Status: DISCONTINUED | OUTPATIENT
Start: 2024-01-08 | End: 2024-01-08 | Stop reason: HOSPADM

## 2024-01-08 RX ORDER — REMIFENTANIL HYDROCHLORIDE 1 MG/ML
INJECTION, POWDER, LYOPHILIZED, FOR SOLUTION INTRAVENOUS CONTINUOUS PRN
Status: DISCONTINUED | OUTPATIENT
Start: 2024-01-08 | End: 2024-01-08

## 2024-01-08 RX ORDER — SODIUM CHLORIDE, SODIUM LACTATE, POTASSIUM CHLORIDE, CALCIUM CHLORIDE 600; 310; 30; 20 MG/100ML; MG/100ML; MG/100ML; MG/100ML
100 INJECTION, SOLUTION INTRAVENOUS CONTINUOUS
Status: DISCONTINUED | OUTPATIENT
Start: 2024-01-08 | End: 2024-01-08 | Stop reason: HOSPADM

## 2024-01-08 RX ORDER — ALBUTEROL SULFATE 0.83 MG/ML
2.5 SOLUTION RESPIRATORY (INHALATION) ONCE
Status: DISCONTINUED | OUTPATIENT
Start: 2024-01-08 | End: 2024-01-08 | Stop reason: HOSPADM

## 2024-01-08 RX ORDER — LIDOCAINE HYDROCHLORIDE AND EPINEPHRINE 10; 10 MG/ML; UG/ML
INJECTION, SOLUTION INFILTRATION; PERINEURAL AS NEEDED
Status: DISCONTINUED | OUTPATIENT
Start: 2024-01-08 | End: 2024-01-08 | Stop reason: HOSPADM

## 2024-01-08 RX ORDER — NORETHINDRONE AND ETHINYL ESTRADIOL 0.5-0.035
KIT ORAL AS NEEDED
Status: DISCONTINUED | OUTPATIENT
Start: 2024-01-08 | End: 2024-01-08

## 2024-01-08 RX ORDER — ACETAMINOPHEN 325 MG/1
650 TABLET ORAL EVERY 6 HOURS PRN
Status: DISCONTINUED | OUTPATIENT
Start: 2024-01-08 | End: 2024-01-08 | Stop reason: HOSPADM

## 2024-01-08 RX ORDER — ONDANSETRON HYDROCHLORIDE 2 MG/ML
INJECTION, SOLUTION INTRAVENOUS AS NEEDED
Status: DISCONTINUED | OUTPATIENT
Start: 2024-01-08 | End: 2024-01-08

## 2024-01-08 RX ORDER — SUCCINYLCHOLINE CHLORIDE 20 MG/ML
INJECTION INTRAMUSCULAR; INTRAVENOUS AS NEEDED
Status: DISCONTINUED | OUTPATIENT
Start: 2024-01-08 | End: 2024-01-08

## 2024-01-08 RX ORDER — HYDROCODONE BITARTRATE AND ACETAMINOPHEN 5; 325 MG/1; MG/1
1 TABLET ORAL EVERY 6 HOURS PRN
Qty: 15 TABLET | Refills: 0 | Status: SHIPPED | OUTPATIENT
Start: 2024-01-08 | End: 2024-01-13 | Stop reason: ENTERED-IN-ERROR

## 2024-01-08 RX ORDER — ATROPINE SULFATE 0.4 MG/ML
INJECTION, SOLUTION ENDOTRACHEAL; INTRAMEDULLARY; INTRAMUSCULAR; INTRAVENOUS; SUBCUTANEOUS AS NEEDED
Status: DISCONTINUED | OUTPATIENT
Start: 2024-01-08 | End: 2024-01-08

## 2024-01-08 RX ORDER — MEPERIDINE HYDROCHLORIDE 25 MG/ML
12.5 INJECTION INTRAMUSCULAR; INTRAVENOUS; SUBCUTANEOUS EVERY 10 MIN PRN
Status: DISCONTINUED | OUTPATIENT
Start: 2024-01-08 | End: 2024-01-08 | Stop reason: HOSPADM

## 2024-01-08 RX ORDER — PROPOFOL 10 MG/ML
INJECTION, EMULSION INTRAVENOUS AS NEEDED
Status: DISCONTINUED | OUTPATIENT
Start: 2024-01-08 | End: 2024-01-08

## 2024-01-08 RX ORDER — LIDOCAINE HYDROCHLORIDE 10 MG/ML
INJECTION INFILTRATION; PERINEURAL AS NEEDED
Status: DISCONTINUED | OUTPATIENT
Start: 2024-01-08 | End: 2024-01-08

## 2024-01-08 RX ORDER — LIDOCAINE HYDROCHLORIDE 10 MG/ML
0.1 INJECTION INFILTRATION; PERINEURAL ONCE
Status: DISCONTINUED | OUTPATIENT
Start: 2024-01-08 | End: 2024-01-08 | Stop reason: HOSPADM

## 2024-01-08 RX ORDER — HYDROCODONE BITARTRATE AND ACETAMINOPHEN 5; 325 MG/1; MG/1
1 TABLET ORAL EVERY 6 HOURS PRN
Status: DISCONTINUED | OUTPATIENT
Start: 2024-01-08 | End: 2024-01-08 | Stop reason: HOSPADM

## 2024-01-08 RX ORDER — ONDANSETRON HYDROCHLORIDE 2 MG/ML
4 INJECTION, SOLUTION INTRAVENOUS ONCE AS NEEDED
Status: DISCONTINUED | OUTPATIENT
Start: 2024-01-08 | End: 2024-01-08 | Stop reason: HOSPADM

## 2024-01-08 RX ORDER — FENTANYL CITRATE 50 UG/ML
INJECTION, SOLUTION INTRAMUSCULAR; INTRAVENOUS AS NEEDED
Status: DISCONTINUED | OUTPATIENT
Start: 2024-01-08 | End: 2024-01-08

## 2024-01-08 RX ORDER — FENTANYL CITRATE 50 UG/ML
50 INJECTION, SOLUTION INTRAMUSCULAR; INTRAVENOUS EVERY 5 MIN PRN
Status: DISCONTINUED | OUTPATIENT
Start: 2024-01-08 | End: 2024-01-08 | Stop reason: HOSPADM

## 2024-01-08 RX ORDER — MIDAZOLAM HYDROCHLORIDE 1 MG/ML
1 INJECTION, SOLUTION INTRAMUSCULAR; INTRAVENOUS ONCE AS NEEDED
Status: DISCONTINUED | OUTPATIENT
Start: 2024-01-08 | End: 2024-01-08 | Stop reason: HOSPADM

## 2024-01-08 RX ORDER — DEXAMETHASONE SODIUM PHOSPHATE 4 MG/ML
INJECTION, SOLUTION INTRA-ARTICULAR; INTRALESIONAL; INTRAMUSCULAR; INTRAVENOUS; SOFT TISSUE AS NEEDED
Status: DISCONTINUED | OUTPATIENT
Start: 2024-01-08 | End: 2024-01-08

## 2024-01-08 RX ADMIN — SODIUM CHLORIDE, POTASSIUM CHLORIDE, SODIUM LACTATE AND CALCIUM CHLORIDE: 600; 310; 30; 20 INJECTION, SOLUTION INTRAVENOUS at 09:02

## 2024-01-08 RX ADMIN — SODIUM CHLORIDE, POTASSIUM CHLORIDE, SODIUM LACTATE AND CALCIUM CHLORIDE: 600; 310; 30; 20 INJECTION, SOLUTION INTRAVENOUS at 07:48

## 2024-01-08 RX ADMIN — REMIFENTANIL HYDROCHLORIDE 0.05 MCG/KG/MIN: 1 INJECTION, POWDER, LYOPHILIZED, FOR SOLUTION INTRAVENOUS at 08:11

## 2024-01-08 RX ADMIN — LIDOCAINE HYDROCHLORIDE 50 MG: 10 INJECTION, SOLUTION INFILTRATION; PERINEURAL at 07:58

## 2024-01-08 RX ADMIN — ATROPINE SULFATE 0.4 MG: 0.4 INJECTION INTRAMUSCULAR; INTRAVENOUS; SUBCUTANEOUS at 08:14

## 2024-01-08 RX ADMIN — PROPOFOL 150 MG: 10 INJECTION, EMULSION INTRAVENOUS at 07:58

## 2024-01-08 RX ADMIN — CEFAZOLIN SODIUM 2 G: 2 INJECTION, SOLUTION INTRAVENOUS at 07:50

## 2024-01-08 RX ADMIN — ATROPINE SULFATE 0.4 MG: 0.4 INJECTION INTRAMUSCULAR; INTRAVENOUS; SUBCUTANEOUS at 08:13

## 2024-01-08 RX ADMIN — FENTANYL CITRATE 50 MCG: 0.05 INJECTION, SOLUTION INTRAMUSCULAR; INTRAVENOUS at 07:58

## 2024-01-08 RX ADMIN — SUCCINYLCHOLINE CHLORIDE 160 MG: 20 INJECTION, SOLUTION INTRAMUSCULAR; INTRAVENOUS at 07:58

## 2024-01-08 RX ADMIN — ONDANSETRON HYDROCHLORIDE 4 MG: 2 INJECTION INTRAMUSCULAR; INTRAVENOUS at 09:14

## 2024-01-08 RX ADMIN — EPHEDRINE SULFATE 10 MG: 50 INJECTION, SOLUTION INTRAVENOUS at 08:46

## 2024-01-08 RX ADMIN — PROPOFOL 50 MG: 10 INJECTION, EMULSION INTRAVENOUS at 08:10

## 2024-01-08 RX ADMIN — FENTANYL CITRATE 25 MCG: 0.05 INJECTION, SOLUTION INTRAMUSCULAR; INTRAVENOUS at 08:10

## 2024-01-08 RX ADMIN — EPHEDRINE SULFATE 5 MG: 50 INJECTION, SOLUTION INTRAVENOUS at 09:25

## 2024-01-08 RX ADMIN — DEXAMETHASONE SODIUM PHOSPHATE 10 MG: 4 INJECTION, SOLUTION INTRA-ARTICULAR; INTRALESIONAL; INTRAMUSCULAR; INTRAVENOUS; SOFT TISSUE at 08:10

## 2024-01-08 ASSESSMENT — PAIN - FUNCTIONAL ASSESSMENT
PAIN_FUNCTIONAL_ASSESSMENT: 0-10

## 2024-01-08 ASSESSMENT — PAIN SCALES - GENERAL
PAINLEVEL_OUTOF10: 0 - NO PAIN

## 2024-01-08 NOTE — POST-PROCEDURE NOTE
1058- pt brought back from pacu,verbal report received. Pt is alert and awake. Family at bedside. Snack and drink given. Rx to go called.    1105- pt ambulated to BR with steady gait and assistance of this nurse.    1120- rx to go at bedside.     1135- vss. Discharge instructions given and explained to pt and family. Both verbally understood.     1140- pt ambulated to BR with steady gait.     1143- pt getting dressed at this time with steady gait.     1150- Dr. Bob at bedside.

## 2024-01-08 NOTE — ANESTHESIA PREPROCEDURE EVALUATION
Patient: Hank Rick    Procedure Information       Date/Time: 01/08/24 0730    Procedure: Inspire (Right)    [facility] / [facility]    Surgeons: Ben Bob MD            Relevant Problems   Anesthesia (within normal limits)      Cardiovascular   (+) Abnormal EKG   (+) Hyperlipidemia   (+) Primary hypertension   (+) Thoracic back pain      Endocrine   (+) Hyponatremia      GI   (+) Esophageal stricture   (+) GERD (gastroesophageal reflux disease)      Neuro/Psych   (+) Cerebrovascular accident (CVA) (CMS/HCC)   (+) Cervical radiculitis   (+) Cubital tunnel syndrome, right   (+) Lumbar radiculopathy   (+) Mixed Alzheimer's and vascular dementia (CMS/HCC)      Pulmonary   (+) Asthma   (+) WALKER (obstructive sleep apnea)   (+) Obstructive sleep apnea      Hematology   (+) Anemia, unspecified      Musculoskeletal   (+) Degenerative lumbar spinal stenosis   (+) Lumbar canal stenosis   (+) Lumbar scoliosis   (+) Lumbar stenosis with neurogenic claudication   (+) Myofascial pain syndrome, cervical   (+) Primary osteoarthritis of both knees   (+) Primary osteoarthritis of left knee   (+) Primary osteoarthritis of right knee      Eyes, Ears, Nose, and Throat   (+) [place] (hard of hearing)      Infectious Disease   (+) Erythrasma       Clinical information reviewed:   Tobacco  Allergies  Meds  Problems  Med Hx  Surg Hx   Fam Hx  Soc   Hx        NPO Detail:  NPO/Void Status  Date of Last Liquid: 01/07/24  Time of Last Liquid: 2000  Date of Last Solid: 01/07/24  Time of Last Solid: 2000  Time of Last Void: 0637         Physical Exam    Airway  Mallampati: II  TM distance: >3 FB     Cardiovascular    Dental - normal exam     Pulmonary    Abdominal            Anesthesia Plan    ASA 2     general     intravenous induction   Anesthetic plan and risks discussed with patient.

## 2024-01-08 NOTE — ANESTHESIA PROCEDURE NOTES
Airway  Date/Time: 1/8/2024 8:04 AM  Urgency: elective    Airway not difficult    Staffing  Performed: CRNA   Authorized by: Fei Quintana DO    Performed by: RAFAEL Florian-CRNA  Patient location during procedure: OR    Indications and Patient Condition  Indications for airway management: anesthesia  Spontaneous Ventilation: absent  Sedation level: deep  Preoxygenated: yes  Patient position: sniffing  MILS maintained throughout  Mask difficulty assessment: 2 - vent by mask + OA or adjuvant +/- NMBA    Final Airway Details  Final airway type: endotracheal airway      Successful airway: ETT  Cuffed: yes   Successful intubation technique: direct laryngoscopy  Facilitating devices/methods: intubating stylet  Endotracheal tube insertion site: oral  Blade: Alina  Blade size: #3  ETT size (mm): 7.5  Cormack-Lehane Classification: grade I - full view of glottis  Placement verified by: chest auscultation and capnometry   Measured from: teeth  ETT to teeth (cm): 23  Number of attempts at approach: 1    Additional Comments  Atraumatic

## 2024-01-08 NOTE — OP NOTE
Date: 2024  OR Location: [facility]    Name: Hank Rick : 1945 Age: 78 y.o. MRN: [id number]  Sex: male      Diagnosis  Pre-op Diagnosis    Pre-Op Diagnosis Codes:     * Obstructive sleep apnea [G47.33]     * BMI 24.0-24.9, adult [Z68.24] Post-op Diagnosis     Pre-Op Diagnosis Codes:     * Obstructive sleep apnea [G47.33]     * BMI 24.0-24.9, adult [Z68.24]   There is no height or weight on file to calculate BMI.     Procedures    Inspire  26097 - [place] OPEN IMPLTJ HPGLSL NRV NSTIM RA PG&RESPIR SENSOR       Surgeons      Ben Bob MD     Assistant:  See OR log    Procedure Summary  Anesthesia: General  Anesthesia Staff: Fei Quintana DO   Estimated Blood Loss: minimal      Specimen:   none    Implants: Inspire stimulator    Findings: Excellent tongue protrusion and excellent respiratory waveform    Indications: Hank Rick 78 y.o. male who is having surgery for Obstructive sleep apnea [G47.33].  He has obstructive sleep apnea and is unable to tolerate CPAP    The patient was seen in the preoperative area. The risks, benefits, complications, treatment options, non-operative alternatives, expected recovery and outcomes were discussed with the patient. The possibilities of reaction to medication, pulmonary aspiration, injury to surrounding structures, bleeding, recurrent infection, the need for additional procedures, failure to diagnose a condition, and creating a complication requiring transfusion or operation were discussed with the patient. The patient concurred with the proposed plan, giving informed consent.  The site of surgery was properly noted/marked if necessary per policy. The patient has been actively warmed in preoperative area. Preoperative antibiotics have been ordered and given within 1 hours of incision. Venous thrombosis prophylaxis have been ordered including bilateral sequential compression devices    Procedure Details: The patient was brought to the operating room placed on the  operating table in the supine position. After blood pressure and cardiac monitors were applied the patient was placed under general anesthesia and orally intubated. Electrode sense was replaced into the genioglossus and hyoglossus and attached to the nerve monitor. Incisions were mapped out in the submandibular and chest wall appropriate areas. These were infiltrated with 1% lidocaine with 1 100,000 epinephrine. The patient was then prepped and draped in the usual sterile fashion. The submandibular incision was made down through skin subcutaneous tissue and platysma. The digastric tendon was identified and retracted inferiorly with 2 stay sutures. The mylohyoid was retracted anteriorly and the submandibular gland was retracted posteriorly. This exposed the hypoglossal nerve. The vena comitans was dissected from the nerve and reflected inferiorly. The nerve was followed anteriorly to the anatomic break point. The inclusive branches including genioglossus transverse cervical and C1 were  from the exclusionary branches of the hyloglossis and styloidglossis. After confirming this division with the nerve monitor the inspire stimulation cuff was placed around the inclusionary branches. The anchoring device was sutured to the digastric membrane in the cuff was filled with saline.  Attention was then directed to the chest wall. An incision was made overlying the rib space between the 2nd and 3rd ribs. Incision was made down through skin and subcutaneous fat. A pocket was created superficial to the pectoralis fascia. Retention sutures were sewn to the fascia. The pectoralis fascia was then incised. The pectoralis muscle was spread over the space between the 2nd and 3rd rib. The external intercostal is a were identified. A perforation was made in the external intercostal is a and the respiratory sensor lead was then passed deep to the external intercostal is a and superficial to the internal intercostal muscles. 2  anchoring devices were sutured to the external intercostal is a and then to the pectoralis fascia.  The stimulation lead was then passed in a sub platysmal plane from the submandibular incision down into the pectoralis incision. Both leads were attached to the generator. The generator was then investigated with stimulation. There was excellent protrusion of the tongue and excellent respiratory waveforms. The device was then deactivated. Both incisions were closed in 2 layers with 3 0 Vicryl suture and a subcutaneous Monocryl suture. Sterile pressure dressings were applied. The patient was let awaken from anesthesia was extubated in the operating room. He was transported to the postanesthesia care unit in stable condition having tolerated the procedure well. There were no complications. Estimated blood loss was minimal  Complications:  None; patient tolerated the procedure well.    Disposition: PACU - hemodynamically stable.  Condition: stable       Attending Attestation: I performed the procedure.    Ben Bob  Phone Number: [phone]

## 2024-01-08 NOTE — ANESTHESIA POSTPROCEDURE EVALUATION
Patient: Hank Rick    Procedure Summary       Date: 01/08/24 Room / Location: [facility]    Anesthesia Start: 0748 Anesthesia Stop: 0953    Procedure: Inspire (Right: Chest) Diagnosis:       Obstructive sleep apnea      BMI 24.0-24.9, adult      (Obstructive sleep apnea [G47.33])      (BMI 24.0-24.9, adult [Z68.24])    Surgeons: Ben Bob MD Responsible Provider: Fei Quintana DO    Anesthesia Type: general ASA Status: 2            Anesthesia Type: general    Vitals Value Taken Time   /79 01/08/24 1058   Temp 36.6 °C (97.9 °F) 01/08/24 1050   Pulse 53 01/08/24 1058   Resp 14 01/08/24 1058   SpO2 95 % 01/08/24 1058       Anesthesia Post Evaluation    Patient location during evaluation: bedside  Patient participation: complete - patient participated  Level of consciousness: awake  Pain management: adequate  Airway patency: patent  Cardiovascular status: acceptable  Respiratory status: acceptable  Hydration status: acceptable  Postoperative Nausea and Vomiting: none        There were no known notable events for this encounter.

## 2024-01-09 RX ORDER — MELOXICAM 15 MG/1
TABLET ORAL
Qty: 30 TABLET | Refills: 0 | Status: ON HOLD | OUTPATIENT
Start: 2024-01-09 | End: 2024-04-26 | Stop reason: ALTCHOICE

## 2024-01-10 ENCOUNTER — DOCUMENTATION (OUTPATIENT)
Dept: PHYSICAL THERAPY | Facility: CLINIC | Age: 79
End: 2024-01-10
Payer: MEDICARE

## 2024-01-10 NOTE — PROGRESS NOTES
Physical Therapy    Discharge Summary    Name: Hank Rick  MRN: [id number]  : 1945  Date: 01/10/24    Visit Count: 6  Insurance: Medicare  Medicare Certification Dates: to 24    Discharge Summary: PT    Discharge Information: Date of discharge 1/10/24, Date of last visit 23, Date of evaluation 23, Number of attended visits 6, Referred by Mark, and Referred for back pain    Discharge Status: unable to assess due to pt not retuning to therapy        Rehab Discharge Reason: Attendance inconsistent and Failed to schedule and/or keep follow-up appointment(s)

## 2024-01-13 ENCOUNTER — APPOINTMENT (OUTPATIENT)
Dept: CARDIOLOGY | Facility: HOSPITAL | Age: 79
End: 2024-01-13
Payer: MEDICARE

## 2024-01-13 ENCOUNTER — HOSPITAL ENCOUNTER (OUTPATIENT)
Facility: HOSPITAL | Age: 79
Setting detail: OBSERVATION
Discharge: HOME | End: 2024-01-14
Attending: EMERGENCY MEDICINE | Admitting: INTERNAL MEDICINE
Payer: MEDICARE

## 2024-01-13 ENCOUNTER — APPOINTMENT (OUTPATIENT)
Dept: RADIOLOGY | Facility: HOSPITAL | Age: 79
End: 2024-01-13
Payer: MEDICARE

## 2024-01-13 DIAGNOSIS — S20.211A CHEST WALL HEMATOMA, RIGHT, INITIAL ENCOUNTER: Primary | ICD-10-CM

## 2024-01-13 LAB
ALBUMIN SERPL BCP-MCNC: 3.6 G/DL (ref 3.4–5)
ALP SERPL-CCNC: 56 U/L (ref 33–136)
ALT SERPL W P-5'-P-CCNC: 24 U/L (ref 10–52)
ANION GAP SERPL CALC-SCNC: 12 MMOL/L (ref 10–20)
APTT PPP: 26 SECONDS (ref 27–38)
AST SERPL W P-5'-P-CCNC: 19 U/L (ref 9–39)
BASOPHILS # BLD AUTO: 0.06 X10*3/UL (ref 0–0.1)
BASOPHILS # BLD AUTO: 0.08 X10*3/UL (ref 0–0.1)
BASOPHILS NFR BLD AUTO: 0.7 %
BASOPHILS NFR BLD AUTO: 0.8 %
BILIRUB SERPL-MCNC: 1.1 MG/DL (ref 0–1.2)
BNP SERPL-MCNC: 55 PG/ML (ref 0–99)
BUN SERPL-MCNC: 20 MG/DL (ref 6–23)
CALCIUM SERPL-MCNC: 8.5 MG/DL (ref 8.6–10.3)
CARDIAC TROPONIN I PNL SERPL HS: 4 NG/L (ref 0–20)
CARDIAC TROPONIN I PNL SERPL HS: 4 NG/L (ref 0–20)
CHLORIDE SERPL-SCNC: 100 MMOL/L (ref 98–107)
CO2 SERPL-SCNC: 25 MMOL/L (ref 21–32)
CREAT SERPL-MCNC: 0.93 MG/DL (ref 0.5–1.3)
EGFRCR SERPLBLD CKD-EPI 2021: 84 ML/MIN/1.73M*2
EOSINOPHIL # BLD AUTO: 0.34 X10*3/UL (ref 0–0.4)
EOSINOPHIL # BLD AUTO: 0.44 X10*3/UL (ref 0–0.4)
EOSINOPHIL NFR BLD AUTO: 3.8 %
EOSINOPHIL NFR BLD AUTO: 4.6 %
ERYTHROCYTE [DISTWIDTH] IN BLOOD BY AUTOMATED COUNT: 13.5 % (ref 11.5–14.5)
ERYTHROCYTE [DISTWIDTH] IN BLOOD BY AUTOMATED COUNT: 13.6 % (ref 11.5–14.5)
FLUAV RNA RESP QL NAA+PROBE: NOT DETECTED
FLUBV RNA RESP QL NAA+PROBE: NOT DETECTED
GLUCOSE SERPL-MCNC: 98 MG/DL (ref 74–99)
HCT VFR BLD AUTO: 38.1 % (ref 41–52)
HCT VFR BLD AUTO: 39.1 % (ref 41–52)
HGB BLD-MCNC: 12.5 G/DL (ref 13.5–17.5)
HGB BLD-MCNC: 12.8 G/DL (ref 13.5–17.5)
IMM GRANULOCYTES # BLD AUTO: 0.12 X10*3/UL (ref 0–0.5)
IMM GRANULOCYTES # BLD AUTO: 0.16 X10*3/UL (ref 0–0.5)
IMM GRANULOCYTES NFR BLD AUTO: 1.4 % (ref 0–0.9)
IMM GRANULOCYTES NFR BLD AUTO: 1.7 % (ref 0–0.9)
INR PPP: 1 (ref 0.9–1.1)
LYMPHOCYTES # BLD AUTO: 1.37 X10*3/UL (ref 0.8–3)
LYMPHOCYTES # BLD AUTO: 1.86 X10*3/UL (ref 0.8–3)
LYMPHOCYTES NFR BLD AUTO: 15.5 %
LYMPHOCYTES NFR BLD AUTO: 19.6 %
MAGNESIUM SERPL-MCNC: 1.8 MG/DL (ref 1.6–2.4)
MCH RBC QN AUTO: 31.2 PG (ref 26–34)
MCH RBC QN AUTO: 31.3 PG (ref 26–34)
MCHC RBC AUTO-ENTMCNC: 32.7 G/DL (ref 32–36)
MCHC RBC AUTO-ENTMCNC: 32.8 G/DL (ref 32–36)
MCV RBC AUTO: 95 FL (ref 80–100)
MCV RBC AUTO: 95 FL (ref 80–100)
MONOCYTES # BLD AUTO: 0.98 X10*3/UL (ref 0.05–0.8)
MONOCYTES # BLD AUTO: 1 X10*3/UL (ref 0.05–0.8)
MONOCYTES NFR BLD AUTO: 10.3 %
MONOCYTES NFR BLD AUTO: 11.3 %
NEUTROPHILS # BLD AUTO: 5.95 X10*3/UL (ref 1.6–5.5)
NEUTROPHILS # BLD AUTO: 5.96 X10*3/UL (ref 1.6–5.5)
NEUTROPHILS NFR BLD AUTO: 63 %
NEUTROPHILS NFR BLD AUTO: 67.3 %
NRBC BLD-RTO: 0 /100 WBCS (ref 0–0)
NRBC BLD-RTO: 0 /100 WBCS (ref 0–0)
PLATELET # BLD AUTO: 206 X10*3/UL (ref 150–450)
PLATELET # BLD AUTO: 225 X10*3/UL (ref 150–450)
POTASSIUM SERPL-SCNC: 3.8 MMOL/L (ref 3.5–5.3)
PROT SERPL-MCNC: 5.9 G/DL (ref 6.4–8.2)
PROTHROMBIN TIME: 11.7 SECONDS (ref 9.8–12.8)
RBC # BLD AUTO: 4 X10*6/UL (ref 4.5–5.9)
RBC # BLD AUTO: 4.1 X10*6/UL (ref 4.5–5.9)
SARS-COV-2 RNA RESP QL NAA+PROBE: NOT DETECTED
SODIUM SERPL-SCNC: 133 MMOL/L (ref 136–145)
WBC # BLD AUTO: 8.9 X10*3/UL (ref 4.4–11.3)
WBC # BLD AUTO: 9.5 X10*3/UL (ref 4.4–11.3)

## 2024-01-13 PROCEDURE — 84484 ASSAY OF TROPONIN QUANT: CPT | Performed by: EMERGENCY MEDICINE

## 2024-01-13 PROCEDURE — 71045 X-RAY EXAM CHEST 1 VIEW: CPT | Performed by: RADIOLOGY

## 2024-01-13 PROCEDURE — 85025 COMPLETE CBC W/AUTO DIFF WBC: CPT | Mod: 91 | Performed by: EMERGENCY MEDICINE

## 2024-01-13 PROCEDURE — 99285 EMERGENCY DEPT VISIT HI MDM: CPT | Performed by: EMERGENCY MEDICINE

## 2024-01-13 PROCEDURE — 71045 X-RAY EXAM CHEST 1 VIEW: CPT

## 2024-01-13 PROCEDURE — 71260 CT THORAX DX C+: CPT | Performed by: STUDENT IN AN ORGANIZED HEALTH CARE EDUCATION/TRAINING PROGRAM

## 2024-01-13 PROCEDURE — G0378 HOSPITAL OBSERVATION PER HR: HCPCS

## 2024-01-13 PROCEDURE — 85730 THROMBOPLASTIN TIME PARTIAL: CPT | Performed by: EMERGENCY MEDICINE

## 2024-01-13 PROCEDURE — 93005 ELECTROCARDIOGRAM TRACING: CPT

## 2024-01-13 PROCEDURE — 70498 CT ANGIOGRAPHY NECK: CPT

## 2024-01-13 PROCEDURE — 71260 CT THORAX DX C+: CPT

## 2024-01-13 PROCEDURE — 36415 COLL VENOUS BLD VENIPUNCTURE: CPT | Performed by: EMERGENCY MEDICINE

## 2024-01-13 PROCEDURE — 70498 CT ANGIOGRAPHY NECK: CPT | Performed by: RADIOLOGY

## 2024-01-13 PROCEDURE — 83735 ASSAY OF MAGNESIUM: CPT | Performed by: EMERGENCY MEDICINE

## 2024-01-13 PROCEDURE — 82565 ASSAY OF CREATININE: CPT | Performed by: EMERGENCY MEDICINE

## 2024-01-13 PROCEDURE — 87636 SARSCOV2 & INF A&B AMP PRB: CPT | Performed by: EMERGENCY MEDICINE

## 2024-01-13 PROCEDURE — 2550000001 HC RX 255 CONTRASTS: Performed by: EMERGENCY MEDICINE

## 2024-01-13 PROCEDURE — 83880 ASSAY OF NATRIURETIC PEPTIDE: CPT | Performed by: EMERGENCY MEDICINE

## 2024-01-13 PROCEDURE — 85610 PROTHROMBIN TIME: CPT | Performed by: EMERGENCY MEDICINE

## 2024-01-13 PROCEDURE — 2500000001 HC RX 250 WO HCPCS SELF ADMINISTERED DRUGS (ALT 637 FOR MEDICARE OP): Performed by: INTERNAL MEDICINE

## 2024-01-13 RX ORDER — MONTELUKAST SODIUM 10 MG/1
10 TABLET ORAL NIGHTLY
Status: DISCONTINUED | OUTPATIENT
Start: 2024-01-13 | End: 2024-01-14 | Stop reason: HOSPADM

## 2024-01-13 RX ORDER — ATORVASTATIN CALCIUM 40 MG/1
40 TABLET, FILM COATED ORAL NIGHTLY
Status: DISCONTINUED | OUTPATIENT
Start: 2024-01-13 | End: 2024-01-14 | Stop reason: HOSPADM

## 2024-01-13 RX ORDER — ASPIRIN 81 MG/1
81 TABLET ORAL DAILY
Status: DISCONTINUED | OUTPATIENT
Start: 2024-01-13 | End: 2024-01-14 | Stop reason: HOSPADM

## 2024-01-13 RX ORDER — ACETAMINOPHEN 160 MG/5ML
650 SOLUTION ORAL EVERY 4 HOURS PRN
Status: DISCONTINUED | OUTPATIENT
Start: 2024-01-13 | End: 2024-01-14 | Stop reason: HOSPADM

## 2024-01-13 RX ORDER — TOPIRAMATE 25 MG/1
25 TABLET ORAL NIGHTLY
Status: DISCONTINUED | OUTPATIENT
Start: 2024-01-13 | End: 2024-01-14 | Stop reason: HOSPADM

## 2024-01-13 RX ORDER — CLOPIDOGREL BISULFATE 75 MG/1
75 TABLET ORAL DAILY
Status: ON HOLD | COMMUNITY
End: 2024-01-14 | Stop reason: SDUPTHER

## 2024-01-13 RX ORDER — OXYCODONE HYDROCHLORIDE 5 MG/1
5 TABLET ORAL EVERY 6 HOURS PRN
Status: DISCONTINUED | OUTPATIENT
Start: 2024-01-13 | End: 2024-01-14 | Stop reason: HOSPADM

## 2024-01-13 RX ORDER — AZELASTINE 1 MG/ML
2 SPRAY, METERED NASAL 2 TIMES DAILY
Status: DISCONTINUED | OUTPATIENT
Start: 2024-01-13 | End: 2024-01-13

## 2024-01-13 RX ORDER — ONDANSETRON 4 MG/1
4 TABLET, FILM COATED ORAL EVERY 8 HOURS PRN
Status: DISCONTINUED | OUTPATIENT
Start: 2024-01-13 | End: 2024-01-14 | Stop reason: HOSPADM

## 2024-01-13 RX ORDER — ALBUTEROL SULFATE 0.83 MG/ML
2.5 SOLUTION RESPIRATORY (INHALATION) EVERY 4 HOURS PRN
Status: DISCONTINUED | OUTPATIENT
Start: 2024-01-13 | End: 2024-01-14 | Stop reason: HOSPADM

## 2024-01-13 RX ORDER — MEMANTINE HYDROCHLORIDE 5 MG/1
5 TABLET ORAL 2 TIMES DAILY
Status: DISCONTINUED | OUTPATIENT
Start: 2024-01-13 | End: 2024-01-14 | Stop reason: HOSPADM

## 2024-01-13 RX ORDER — ACETAMINOPHEN 650 MG/1
650 SUPPOSITORY RECTAL EVERY 4 HOURS PRN
Status: DISCONTINUED | OUTPATIENT
Start: 2024-01-13 | End: 2024-01-14 | Stop reason: HOSPADM

## 2024-01-13 RX ORDER — TRAZODONE HYDROCHLORIDE 50 MG/1
50 TABLET ORAL NIGHTLY
Status: DISCONTINUED | OUTPATIENT
Start: 2024-01-13 | End: 2024-01-14 | Stop reason: HOSPADM

## 2024-01-13 RX ORDER — LORATADINE 10 MG/1
10 TABLET ORAL DAILY
Status: DISCONTINUED | OUTPATIENT
Start: 2024-01-13 | End: 2024-01-14 | Stop reason: HOSPADM

## 2024-01-13 RX ORDER — ACETAMINOPHEN 325 MG/1
650 TABLET ORAL EVERY 4 HOURS PRN
Status: DISCONTINUED | OUTPATIENT
Start: 2024-01-13 | End: 2024-01-14 | Stop reason: HOSPADM

## 2024-01-13 RX ORDER — PROPRANOLOL HYDROCHLORIDE 60 MG/1
60 CAPSULE, EXTENDED RELEASE ORAL DAILY
Status: DISCONTINUED | OUTPATIENT
Start: 2024-01-13 | End: 2024-01-14 | Stop reason: HOSPADM

## 2024-01-13 RX ORDER — ACETAMINOPHEN 325 MG/1
975 TABLET ORAL ONCE
Status: COMPLETED | OUTPATIENT
Start: 2024-01-13 | End: 2024-01-13

## 2024-01-13 RX ORDER — AMLODIPINE BESYLATE 5 MG/1
5 TABLET ORAL DAILY
Status: DISCONTINUED | OUTPATIENT
Start: 2024-01-13 | End: 2024-01-14 | Stop reason: HOSPADM

## 2024-01-13 RX ORDER — ONDANSETRON HYDROCHLORIDE 2 MG/ML
4 INJECTION, SOLUTION INTRAVENOUS EVERY 8 HOURS PRN
Status: DISCONTINUED | OUTPATIENT
Start: 2024-01-13 | End: 2024-01-14 | Stop reason: HOSPADM

## 2024-01-13 RX ORDER — POLYETHYLENE GLYCOL 3350 17 G/17G
17 POWDER, FOR SOLUTION ORAL DAILY
Status: DISCONTINUED | OUTPATIENT
Start: 2024-01-13 | End: 2024-01-14 | Stop reason: HOSPADM

## 2024-01-13 RX ORDER — TALC
3 POWDER (GRAM) TOPICAL DAILY
Status: DISCONTINUED | OUTPATIENT
Start: 2024-01-13 | End: 2024-01-14 | Stop reason: HOSPADM

## 2024-01-13 RX ORDER — CLOPIDOGREL BISULFATE 75 MG/1
75 TABLET ORAL DAILY
Status: DISCONTINUED | OUTPATIENT
Start: 2024-01-13 | End: 2024-01-14 | Stop reason: HOSPADM

## 2024-01-13 RX ORDER — ALBUTEROL SULFATE 90 UG/1
2 AEROSOL, METERED RESPIRATORY (INHALATION) EVERY 6 HOURS PRN
Status: DISCONTINUED | OUTPATIENT
Start: 2024-01-13 | End: 2024-01-13

## 2024-01-13 RX ORDER — LIDOCAINE 560 MG/1
1 PATCH PERCUTANEOUS; TOPICAL; TRANSDERMAL DAILY
Status: DISCONTINUED | OUTPATIENT
Start: 2024-01-13 | End: 2024-01-14 | Stop reason: HOSPADM

## 2024-01-13 RX ADMIN — OXYCODONE HYDROCHLORIDE 5 MG: 5 TABLET ORAL at 21:34

## 2024-01-13 RX ADMIN — ATORVASTATIN CALCIUM 40 MG: 40 TABLET, FILM COATED ORAL at 21:18

## 2024-01-13 RX ADMIN — MEMANTINE 5 MG: 5 TABLET ORAL at 21:18

## 2024-01-13 RX ADMIN — TOPIRAMATE 25 MG: 25 TABLET, FILM COATED ORAL at 21:18

## 2024-01-13 RX ADMIN — Medication 3 MG: at 21:18

## 2024-01-13 RX ADMIN — MONTELUKAST 10 MG: 10 TABLET, FILM COATED ORAL at 21:18

## 2024-01-13 RX ADMIN — IOHEXOL 75 ML: 350 INJECTION, SOLUTION INTRAVENOUS at 06:27

## 2024-01-13 RX ADMIN — ACETAMINOPHEN 975 MG: 325 TABLET ORAL at 05:16

## 2024-01-13 RX ADMIN — TRAZODONE HYDROCHLORIDE 50 MG: 50 TABLET ORAL at 21:18

## 2024-01-13 SDOH — SOCIAL STABILITY: SOCIAL INSECURITY: ABUSE: ADULT

## 2024-01-13 SDOH — SOCIAL STABILITY: SOCIAL INSECURITY: DO YOU FEEL ANYONE HAS EXPLOITED OR TAKEN ADVANTAGE OF YOU FINANCIALLY OR OF YOUR PERSONAL PROPERTY?: NO

## 2024-01-13 SDOH — SOCIAL STABILITY: SOCIAL INSECURITY: HAVE YOU HAD THOUGHTS OF HARMING ANYONE ELSE?: NO

## 2024-01-13 SDOH — SOCIAL STABILITY: SOCIAL INSECURITY: HAS ANYONE EVER THREATENED TO HURT YOUR FAMILY OR YOUR PETS?: NO

## 2024-01-13 SDOH — SOCIAL STABILITY: SOCIAL INSECURITY: ARE THERE ANY APPARENT SIGNS OF INJURIES/BEHAVIORS THAT COULD BE RELATED TO ABUSE/NEGLECT?: NO

## 2024-01-13 SDOH — SOCIAL STABILITY: SOCIAL INSECURITY: DOES ANYONE TRY TO KEEP YOU FROM HAVING/CONTACTING OTHER FRIENDS OR DOING THINGS OUTSIDE YOUR HOME?: NO

## 2024-01-13 SDOH — SOCIAL STABILITY: SOCIAL INSECURITY: WERE YOU ABLE TO COMPLETE ALL THE BEHAVIORAL HEALTH SCREENINGS?: YES

## 2024-01-13 SDOH — SOCIAL STABILITY: SOCIAL INSECURITY: DO YOU FEEL UNSAFE GOING BACK TO THE PLACE WHERE YOU ARE LIVING?: NO

## 2024-01-13 SDOH — SOCIAL STABILITY: SOCIAL INSECURITY: ARE YOU OR HAVE YOU BEEN THREATENED OR ABUSED PHYSICALLY, EMOTIONALLY, OR SEXUALLY BY ANYONE?: NO

## 2024-01-13 ASSESSMENT — COGNITIVE AND FUNCTIONAL STATUS - GENERAL
DAILY ACTIVITIY SCORE: 24
PATIENT BASELINE BEDBOUND: NO
MOBILITY SCORE: 24

## 2024-01-13 ASSESSMENT — PAIN SCALES - GENERAL
PAINLEVEL_OUTOF10: 6
PAINLEVEL_OUTOF10: 6
PAINLEVEL_OUTOF10: 5 - MODERATE PAIN
PAINLEVEL_OUTOF10: 3

## 2024-01-13 ASSESSMENT — ACTIVITIES OF DAILY LIVING (ADL)
PATIENT'S MEMORY ADEQUATE TO SAFELY COMPLETE DAILY ACTIVITIES?: YES
WALKS IN HOME: INDEPENDENT
GROOMING: INDEPENDENT
JUDGMENT_ADEQUATE_SAFELY_COMPLETE_DAILY_ACTIVITIES: YES
LACK_OF_TRANSPORTATION: NO
BATHING: INDEPENDENT
FEEDING YOURSELF: INDEPENDENT
HEARING - RIGHT EAR: DIFFICULTY WITH NOISE
ADEQUATE_TO_COMPLETE_ADL: YES
DRESSING YOURSELF: INDEPENDENT
HEARING - LEFT EAR: DIFFICULTY WITH NOISE
TOILETING: INDEPENDENT

## 2024-01-13 ASSESSMENT — LIFESTYLE VARIABLES
SKIP TO QUESTIONS 9-10: 1
HOW OFTEN DO YOU HAVE A DRINK CONTAINING ALCOHOL: MONTHLY OR LESS
HOW MANY STANDARD DRINKS CONTAINING ALCOHOL DO YOU HAVE ON A TYPICAL DAY: 1 OR 2
PRESCIPTION_ABUSE_PAST_12_MONTHS: NO
AUDIT-C TOTAL SCORE: 1
SUBSTANCE_ABUSE_PAST_12_MONTHS: NO
AUDIT-C TOTAL SCORE: 1
HOW OFTEN DO YOU HAVE 6 OR MORE DRINKS ON ONE OCCASION: NEVER

## 2024-01-13 ASSESSMENT — PATIENT HEALTH QUESTIONNAIRE - PHQ9
1. LITTLE INTEREST OR PLEASURE IN DOING THINGS: NOT AT ALL
2. FEELING DOWN, DEPRESSED OR HOPELESS: NOT AT ALL
SUM OF ALL RESPONSES TO PHQ9 QUESTIONS 1 & 2: 0

## 2024-01-13 ASSESSMENT — PAIN - FUNCTIONAL ASSESSMENT
PAIN_FUNCTIONAL_ASSESSMENT: 0-10

## 2024-01-13 ASSESSMENT — COLUMBIA-SUICIDE SEVERITY RATING SCALE - C-SSRS
1. IN THE PAST MONTH, HAVE YOU WISHED YOU WERE DEAD OR WISHED YOU COULD GO TO SLEEP AND NOT WAKE UP?: NO
2. HAVE YOU ACTUALLY HAD ANY THOUGHTS OF KILLING YOURSELF?: NO
6. HAVE YOU EVER DONE ANYTHING, STARTED TO DO ANYTHING, OR PREPARED TO DO ANYTHING TO END YOUR LIFE?: NO

## 2024-01-13 ASSESSMENT — PAIN DESCRIPTION - LOCATION: LOCATION: CHEST

## 2024-01-13 NOTE — PROGRESS NOTES
Pharmacy Medication History Review    Hank Rick is a 78 y.o. male admitted for Chest wall hematoma, right, initial encounter. Pharmacy reviewed the patient's [id number] medications and allergies for accuracy.    The list below reflectives the updated PTA list. Please review each medication in order reconciliation for additional clarification and justification.  (Not in a hospital admission)       The list below reflectives the updated allergy list. Please review each documented allergy for additional clarification and justification.  Allergies  Reviewed by Fei Quintana DO on 1/8/2024        Severity Reactions Comments    Pollen Extracts Not Specified Other Runny nose    Gabapentin Low Anxiety             Below are additional concerns with the patient's PTA list.  Prior to Admission Medications   Prescriptions Last Dose Informant   acetaminophen (Tylenol 8 HOUR) 650 mg ER tablet 1/12/2024    Sig: Take 2 tablets (1,300 mg) by mouth once daily. Do not crush, chew, or split.   albuterol 90 mcg/actuation inhaler     Sig: Inhale 2 puffs. Every 4 to 6 hours as needed as directed   amLODIPine (Norvasc) 2.5 mg tablet 1/12/2024    Sig: Take  2 tablets every day , please note change in dose   aspirin 81 mg EC tablet 1/12/2024    Sig: Take 1 tablet (81 mg) by mouth once daily.   atorvastatin (Lipitor) 40 mg tablet 1/12/2024    Sig: Take 1 tablet (40 mg) by mouth once daily.   azelastine (Astelin) 137 mcg (0.1 %) nasal spray 1/12/2024    Sig: Administer 2 sprays into each nostril 2 times a day.   clopidogrel (Plavix) 75 mg tablet 1/12/2024    Sig: Take 1 tablet (75 mg) by mouth once daily.   diclofenac sodium (Voltaren) 1 % gel gel     Sig: Apply topically 2 times a day as needed.   fexofenadine (Allegra) 180 mg tablet 1/12/2024    Sig: Take 1 tablet (180 mg) by mouth once daily.   fluticasone (Flonase) 50 mcg/actuation nasal spray 1/12/2024    Sig: Administer 1-2 sprays into each nostril once daily.   lidocaine 4  % patch     Sig: APPLY 1 PATCH TOPICALLY TO AFFECTED AREA ONCE DAILY. LEAVE IN PLACE FOR 12 HOURS, THEN REMOVE AND KEEP OFF FOR 12 HOURS.   Patient taking differently: Place 1 patch on the skin once daily as needed. WEAR FOR 12 HOURS THEN REMOVE FOR 12 HOURS   meloxicam (Mobic) 15 mg tablet     Sig: take 1 tablet by mouth once daily if needed with food for MODERATE PAIN ( PAIN SCALE 4-6 )   memantine (Namenda) 5 mg tablet 1/12/2024    Sig: Take 1 tablet (5 mg) by mouth 2 times a day.   montelukast (Singulair) 10 mg tablet 1/12/2024    Sig: Take 1 tablet (10 mg) by mouth once daily.   omeprazole OTC (PriLOSEC OTC) 20 mg EC tablet 1/12/2024    Sig: Take 1 tablet (20 mg) by mouth once daily.   propranolol (Inderal) 10 mg tablet 1/12/2024    Sig: Take 1 tablet (10 mg) by mouth 3 times a day.   propranolol LA (Inderal LA) 60 mg 24 hr capsule 1/12/2024    Sig: Take 1 capsule (60 mg) by mouth once daily.   sildenafil (Viagra) 100 mg tablet     Sig: Take 0.5-1 tablets ( mg) by mouth if needed for erectile dysfunction (1 hour prior to sexual activity (max 1 per day)).   Patient not taking: Reported on 1/8/2024   topiramate (Topamax) 25 mg tablet     Sig: Take 1-2 tablets (25-50 mg) by mouth as needed at bedtime.   traZODone (Desyrel) 50 mg tablet 1/12/2024    Sig: Take 1 tablet (50 mg) by mouth once daily at bedtime.      Facility-Administered Medications: None      Per patient     Fior Barcenas, [facility]

## 2024-01-13 NOTE — PROGRESS NOTES
I received Hank Rick in signout from Dr. Wilson.  Please see the previous note for all HPI, PE and MDM up to the time of signout at 0600.    In brief Hank Rick is an 78 y.o. male presenting for   Chief Complaint   Patient presents with    Chest Pain    Post-op Problem     Patient states that he had an inspire device placed on Monday for sleep apnea and notice swelling at the site (right upper chest) tonight. Patient c/o chest pain. Patient restarted his plavix yesterday.    .  At the time of signout we were awaiting: CT scan results    ED Course as of 01/13/24 1312   Sat Jan 13, 2024   0620 Pt received in sign out pending CT imaging. Pt with recent placement of stimulator for sleep apnea. Has increased swelling over site. HD stable, covid/fu negative. Trop wnl. Pending CT imaging [LP]      ED Course User Index  [LP] Edith Greene DO     CT reviewed, hematoma noted, cannot rule out active extrav. Repeat CBC ordered. Pt remains HD stable, no further worsening of swelling. Will place in obs for monitoring of cbc  Pt Disposition: observation    Procedures    Edith Greene DO  Emergency Medicine  Medical Toxicology

## 2024-01-13 NOTE — ED PROVIDER NOTES
"HPI   Chief Complaint   Patient presents with    Chest Pain    Post-op Problem     Patient states that he had an inspire device placed on Monday for sleep apnea and notice swelling at the site (right upper chest) tonight. Patient c/o chest pain. Patient restarted his plavix yesterday.        The patient had placement of a obstructive sleep apnea stimulator 5 days ago.  Patient is going to bed and awoke with more significant swelling and \"stinging in his right upper chest.  He already had bruising in this area.  Patient takes Plavix.  He denies any fever, cough, vomiting, diarrhea, constipation or other symptoms.  He is more concerned about the swelling he is considering infection but has not been on antibiotics.                          No data recorded                  Patient History   Past Medical History:   Diagnosis Date    Arthritis     Asthma     Cataract     Cerebral vascular accident (CMS/HCC) 05/09/2023    COVID-19 11/15/2022    COVID-19 virus infection    Esophageal obstruction 05/15/2022    Esophageal stricture    Essential tremor 12/29/2022    Essential tremor    GERD (gastroesophageal reflux disease)     Hard of hearing     Hearing aid worn     Hyperlipidemia     Hypertension     Memory impairment     Olecranon bursitis, left elbow 04/26/2016    Olecranon bursitis of left elbow    Personal history of diseases of the blood and blood-forming organs and certain disorders involving the immune mechanism 05/17/2022    History of anemia    Personal history of other diseases of the digestive system 11/13/2014    History of gastroesophageal reflux (GERD)    Personal history of other diseases of the digestive system 01/14/2021    History of hiatal hernia    Personal history of other specified conditions 11/29/2018    History of urinary frequency    Rash and other nonspecific skin eruption 11/02/2021    Skin rash    Salmonella enteritis     Salmonella enteritis    Sleep apnea     TIA (transient ischemic attack)  "    Vertebral artery stenosis      Past Surgical History:   Procedure Laterality Date    BACK SURGERY      CATARACT EXTRACTION      CT ANGIO NECK  05/12/2023    CT NECK ANGIO W AND WO IV CONTRAST 5/12/2023 [facility] CT    CT HEAD ANGIO W AND WO IV CONTRAST  05/12/2023    CT HEAD ANGIO W AND WO IV CONTRAST 5/12/2023 [facility] CT    HERNIA REPAIR      MR HEAD ANGIO WO IV CONTRAST  05/09/2023    MR HEAD ANGIO WO IV CONTRAST 5/9/2023 [facility]    MR NECK ANGIO WO IV CONTRAST  05/09/2023    MR NECK ANGIO WO IV CONTRAST 5/9/2023 [facility]    OTHER SURGICAL HISTORY  01/07/2015    Esophagogastric Fundoplasty Laparoscopic For Paraesophageal Hernia Repair    OTHER SURGICAL HISTORY  03/05/2014    Shoulder Surgery Left    OTHER SURGICAL HISTORY      REFRACTIVE SURGERY  03/05/2014    Corneal LASIK    TONSILLECTOMY  11/18/2014    Tonsillectomy    TOTAL SHOULDER ARTHROPLASTY Right     reverse     Family History   Problem Relation Name Age of Onset    Multiple sclerosis Mother      Colon cancer Father       Social History     Tobacco Use    Smoking status: Never    Smokeless tobacco: Never   Vaping Use    Vaping Use: Never used   Substance Use Topics    Alcohol use: Yes     Alcohol/week: 4.0 standard drinks of alcohol     Types: 4 Shots of liquor per week    Drug use: Not Currently     Types: Marijuana     Comment: PAST USE 20 YEARS AGO       Physical Exam   ED Triage Vitals   Temp Heart Rate Resp BP   01/13/24 0413 01/13/24 0413 01/13/24 0413 01/13/24 0413   36.9 °C (98.5 °F) 60 18 (!) 149/93      SpO2 Temp src Heart Rate Source Patient Position   01/13/24 0413 -- 01/13/24 0533 --   96 %  Monitor       BP Location FiO2 (%)     -- --             Physical Exam  Vitals and nursing note reviewed.   Constitutional:       General: He is not in acute distress.     Appearance: He is well-developed.   HENT:      Head: Normocephalic and atraumatic.   Eyes:      Conjunctiva/sclera: Conjunctivae normal.   Cardiovascular:      Rate and Rhythm: Normal rate and  regular rhythm.      Heart sounds: No murmur heard.  Pulmonary:      Effort: Pulmonary effort is normal. No respiratory distress.      Breath sounds: Normal breath sounds.   Abdominal:      Palpations: Abdomen is soft.      Tenderness: There is no abdominal tenderness.   Musculoskeletal:         General: No swelling.      Cervical back: Neck supple.   Skin:     General: Skin is warm and dry.      Capillary Refill: Capillary refill takes less than 2 seconds.      Findings: Ecchymosis present.      Comments: Significant ecchymosis during the entire neck going down to the right pectoral there is a large amount of raised material concerning for extremely large hematoma underlying the chest incision is a 5 x 5 x 5 cm rubbery fluctuant mass concerning for hematoma.  No obvious pulsatility   Neurological:      Mental Status: He is alert.   Psychiatric:         Mood and Affect: Mood normal.         ED Course & MDM   ED Course as of 01/25/24 0035   Sat Jan 13, 2024   0620 Pt received in sign out pending CT imaging. Pt with recent placement of stimulator for sleep apnea. Has increased swelling over site. HD stable, covid/fu negative. Trop wnl. Pending CT imaging [LP]      ED Course User Index  [LP] Edith Greene DO         Diagnoses as of 01/25/24 0035   Chest wall hematoma, right, initial encounter       Medical Decision Making  I was concern for active hemorrhage and new hematoma.  Unclear if there is any violation of the carotid or jugular.  No obvious pulsatile mass.  A CT angio of the neck was obtained with chest runoff.  This was pending at time of signout to oncoming provider.    EKG interpreted by myself.  Normal sinus rhythm at a rate of 55 bpm.  Normal intervals.  Normal axis.  No signs of acute ischemia.      Repeat EKG interpreted by myself.  Bradycardic sinus rhythm at a rate of 47 bpm.  Normal intervals.  Normal axis.  No signs of acute ischemia.      Procedure  Procedures     Stan Wilson MD  01/13/24  0709       Stan Wislon MD  01/13/24 0710       Stan Wilson MD  01/25/24 0035

## 2024-01-14 VITALS
OXYGEN SATURATION: 96 % | DIASTOLIC BLOOD PRESSURE: 75 MMHG | HEIGHT: 73 IN | SYSTOLIC BLOOD PRESSURE: 127 MMHG | WEIGHT: 183 LBS | HEART RATE: 50 BPM | RESPIRATION RATE: 17 BRPM | BODY MASS INDEX: 24.25 KG/M2 | TEMPERATURE: 99 F

## 2024-01-14 PROBLEM — S20.211A CHEST WALL HEMATOMA, RIGHT, INITIAL ENCOUNTER: Status: RESOLVED | Noted: 2024-01-13 | Resolved: 2024-01-14

## 2024-01-14 LAB
ANION GAP SERPL CALC-SCNC: 12 MMOL/L (ref 10–20)
BUN SERPL-MCNC: 20 MG/DL (ref 6–23)
CALCIUM SERPL-MCNC: 8.2 MG/DL (ref 8.6–10.3)
CHLORIDE SERPL-SCNC: 99 MMOL/L (ref 98–107)
CO2 SERPL-SCNC: 26 MMOL/L (ref 21–32)
CREAT SERPL-MCNC: 0.93 MG/DL (ref 0.5–1.3)
EGFRCR SERPLBLD CKD-EPI 2021: 84 ML/MIN/1.73M*2
ERYTHROCYTE [DISTWIDTH] IN BLOOD BY AUTOMATED COUNT: 13.6 % (ref 11.5–14.5)
GLUCOSE SERPL-MCNC: 107 MG/DL (ref 74–99)
HCT VFR BLD AUTO: 35.4 % (ref 41–52)
HCT VFR BLD AUTO: 35.7 % (ref 41–52)
HGB BLD-MCNC: 11.5 G/DL (ref 13.5–17.5)
HGB BLD-MCNC: 11.7 G/DL (ref 13.5–17.5)
HOLD SPECIMEN: NORMAL
MCH RBC QN AUTO: 30.7 PG (ref 26–34)
MCHC RBC AUTO-ENTMCNC: 32.2 G/DL (ref 32–36)
MCV RBC AUTO: 96 FL (ref 80–100)
NRBC BLD-RTO: 0 /100 WBCS (ref 0–0)
PLATELET # BLD AUTO: 213 X10*3/UL (ref 150–450)
POTASSIUM SERPL-SCNC: 3.6 MMOL/L (ref 3.5–5.3)
RBC # BLD AUTO: 3.74 X10*6/UL (ref 4.5–5.9)
SODIUM SERPL-SCNC: 133 MMOL/L (ref 136–145)
WBC # BLD AUTO: 8.4 X10*3/UL (ref 4.4–11.3)

## 2024-01-14 PROCEDURE — 36415 COLL VENOUS BLD VENIPUNCTURE: CPT | Performed by: INTERNAL MEDICINE

## 2024-01-14 PROCEDURE — 2500000001 HC RX 250 WO HCPCS SELF ADMINISTERED DRUGS (ALT 637 FOR MEDICARE OP): Performed by: INTERNAL MEDICINE

## 2024-01-14 PROCEDURE — 85027 COMPLETE CBC AUTOMATED: CPT | Performed by: INTERNAL MEDICINE

## 2024-01-14 PROCEDURE — 84520 ASSAY OF UREA NITROGEN: CPT | Performed by: INTERNAL MEDICINE

## 2024-01-14 PROCEDURE — 85014 HEMATOCRIT: CPT | Mod: 59 | Performed by: INTERNAL MEDICINE

## 2024-01-14 PROCEDURE — 2500000004 HC RX 250 GENERAL PHARMACY W/ HCPCS (ALT 636 FOR OP/ED): Mod: MUE | Performed by: INTERNAL MEDICINE

## 2024-01-14 PROCEDURE — G0378 HOSPITAL OBSERVATION PER HR: HCPCS

## 2024-01-14 RX ORDER — CLOPIDOGREL BISULFATE 75 MG/1
75 TABLET ORAL DAILY
Qty: 30 TABLET | Refills: 0 | Status: SHIPPED | OUTPATIENT
Start: 2024-01-14 | End: 2024-02-13

## 2024-01-14 RX ORDER — ASPIRIN 81 MG/1
81 TABLET ORAL DAILY
Qty: 30 TABLET | Refills: 0 | Status: SHIPPED | OUTPATIENT
Start: 2024-01-14

## 2024-01-14 RX ADMIN — MEMANTINE 5 MG: 5 TABLET ORAL at 08:32

## 2024-01-14 RX ADMIN — AMLODIPINE BESYLATE 5 MG: 5 TABLET ORAL at 08:32

## 2024-01-14 RX ADMIN — POLYETHYLENE GLYCOL 3350 17 G: 17 POWDER, FOR SOLUTION ORAL at 08:32

## 2024-01-14 RX ADMIN — LORATADINE 10 MG: 10 TABLET ORAL at 08:32

## 2024-01-14 RX ADMIN — PROPRANOLOL HYDROCHLORIDE 60 MG: 60 CAPSULE, EXTENDED RELEASE ORAL at 10:47

## 2024-01-14 ASSESSMENT — COGNITIVE AND FUNCTIONAL STATUS - GENERAL
DAILY ACTIVITIY SCORE: 24
MOBILITY SCORE: 24

## 2024-01-14 ASSESSMENT — ENCOUNTER SYMPTOMS
NEUROLOGICAL NEGATIVE: 1
RESPIRATORY NEGATIVE: 1
CONSTITUTIONAL NEGATIVE: 1
EYES NEGATIVE: 1
CARDIOVASCULAR NEGATIVE: 1

## 2024-01-14 ASSESSMENT — PAIN SCALES - GENERAL: PAINLEVEL_OUTOF10: 0 - NO PAIN

## 2024-01-14 NOTE — H&P
History Of Present Illness  Hank Rick is a 78 y.o. male presenting with chest wall swelling,.  He just had a inspire installed in the right chest wall, his aspirin and Plavix was on hold for surgery, he started noticing increased swelling of that right chest, so he came to the emergency department, they did a CAT scan of the neck and the chest, he is being admitted here just to observe.  His hemoglobin has been stable except that today it is 11.5.  He denied any dizziness no falls.  .     Past Medical History  He has a past medical history of Arthritis, Asthma, Cataract, Cerebral vascular accident (CMS/HCC) (05/09/2023), COVID-19 (11/15/2022), Esophageal obstruction (05/15/2022), Essential tremor (12/29/2022), GERD (gastroesophageal reflux disease), Hard of hearing, Hearing aid worn, Hyperlipidemia, Hypertension, Memory impairment, Olecranon bursitis, left elbow (04/26/2016), Personal history of diseases of the blood and blood-forming organs and certain disorders involving the immune mechanism (05/17/2022), Personal history of other diseases of the digestive system (11/13/2014), Personal history of other diseases of the digestive system (01/14/2021), Personal history of other specified conditions (11/29/2018), Rash and other nonspecific skin eruption (11/02/2021), Salmonella enteritis, Sleep apnea, TIA (transient ischemic attack), and Vertebral artery stenosis.    Surgical History  He has a past surgical history that includes Other surgical history (01/07/2015); Tonsillectomy (11/18/2014); Refractive surgery (03/05/2014); Other surgical history (03/05/2014); MR angio head wo IV contrast (05/09/2023); MR angio neck wo IV contrast (05/09/2023); CT angio head w and wo IV contrast (05/12/2023); CT angio neck (05/12/2023); Other surgical history; Back surgery; Cataract extraction; Total shoulder arthroplasty (Right); and Hernia repair.     Social History  He reports that he has never smoked. He has never used  "smokeless tobacco. He reports current alcohol use of about 4.0 standard drinks of alcohol per week. He reports that he does not currently use drugs after having used the following drugs: Marijuana.    Family History  Family History   Problem Relation Name Age of Onset    Multiple sclerosis Mother      Colon cancer Father          Allergies  Pollen extracts and Gabapentin    Review of Systems   Constitutional: Negative.    HENT: Negative.     Eyes: Negative.    Respiratory: Negative.     Cardiovascular: Negative.    Genitourinary: Negative.    Neurological: Negative.         Physical Exam  Constitutional:       Appearance: Normal appearance.   Eyes:      Pupils: Pupils are equal, round, and reactive to light.   Cardiovascular:      Rate and Rhythm: Normal rate and regular rhythm.      Heart sounds: Normal heart sounds.   Abdominal:      General: Abdomen is flat.      Palpations: Abdomen is soft.   Musculoskeletal:         General: Swelling present.      Cervical back: Normal range of motion and neck supple.   Neurological:      General: No focal deficit present.      Mental Status: He is alert and oriented to person, place, and time.   Psychiatric:         Mood and Affect: Mood normal.          Last Recorded Vitals  Blood pressure 127/75, pulse 50, temperature 37.2 °C (99 °F), temperature source Temporal, resp. rate 17, height 1.854 m (6' 1\"), weight 83 kg (183 lb), SpO2 96 %.    Relevant Results  Results for orders placed or performed during the hospital encounter of 01/13/24 (from the past 96 hour(s))   CBC and Auto Differential   Result Value Ref Range    WBC 9.5 4.4 - 11.3 x10*3/uL    nRBC 0.0 0.0 - 0.0 /100 WBCs    RBC 4.10 (L) 4.50 - 5.90 x10*6/uL    Hemoglobin 12.8 (L) 13.5 - 17.5 g/dL    Hematocrit 39.1 (L) 41.0 - 52.0 %    MCV 95 80 - 100 fL    MCH 31.2 26.0 - 34.0 pg    MCHC 32.7 32.0 - 36.0 g/dL    RDW 13.6 11.5 - 14.5 %    Platelets 225 150 - 450 x10*3/uL    Neutrophils % 63.0 40.0 - 80.0 %    Immature " Granulocytes %, Automated 1.7 (H) 0.0 - 0.9 %    Lymphocytes % 19.6 13.0 - 44.0 %    Monocytes % 10.3 2.0 - 10.0 %    Eosinophils % 4.6 0.0 - 6.0 %    Basophils % 0.8 0.0 - 2.0 %    Neutrophils Absolute 5.95 (H) 1.60 - 5.50 x10*3/uL    Immature Granulocytes Absolute, Automated 0.16 0.00 - 0.50 x10*3/uL    Lymphocytes Absolute 1.86 0.80 - 3.00 x10*3/uL    Monocytes Absolute 0.98 (H) 0.05 - 0.80 x10*3/uL    Eosinophils Absolute 0.44 (H) 0.00 - 0.40 x10*3/uL    Basophils Absolute 0.08 0.00 - 0.10 x10*3/uL   Comprehensive Metabolic Panel   Result Value Ref Range    Glucose 98 74 - 99 mg/dL    Sodium 133 (L) 136 - 145 mmol/L    Potassium 3.8 3.5 - 5.3 mmol/L    Chloride 100 98 - 107 mmol/L    Bicarbonate 25 21 - 32 mmol/L    Anion Gap 12 10 - 20 mmol/L    Urea Nitrogen 20 6 - 23 mg/dL    Creatinine 0.93 0.50 - 1.30 mg/dL    eGFR 84 >60 mL/min/1.73m*2    Calcium 8.5 (L) 8.6 - 10.3 mg/dL    Albumin 3.6 3.4 - 5.0 g/dL    Alkaline Phosphatase 56 33 - 136 U/L    Total Protein 5.9 (L) 6.4 - 8.2 g/dL    AST 19 9 - 39 U/L    Bilirubin, Total 1.1 0.0 - 1.2 mg/dL    ALT 24 10 - 52 U/L   Magnesium   Result Value Ref Range    Magnesium 1.80 1.60 - 2.40 mg/dL   aPTT   Result Value Ref Range    aPTT 26 (L) 27 - 38 seconds   Protime-INR   Result Value Ref Range    Protime 11.7 9.8 - 12.8 seconds    INR 1.0 0.9 - 1.1   B-Type Natriuretic Peptide   Result Value Ref Range    BNP 55 0 - 99 pg/mL   Troponin I, High Sensitivity, Initial   Result Value Ref Range    Troponin I, High Sensitivity 4 0 - 20 ng/L   Sars-CoV-2 and Influenza A/B PCR   Result Value Ref Range    Flu A Result Not Detected Not Detected    Flu B Result Not Detected Not Detected    Coronavirus 2019, PCR Not Detected Not Detected   Troponin, High Sensitivity, 1 Hour   Result Value Ref Range    Troponin I, High Sensitivity 4 0 - 20 ng/L   CBC and Auto Differential   Result Value Ref Range    WBC 8.9 4.4 - 11.3 x10*3/uL    nRBC 0.0 0.0 - 0.0 /100 WBCs    RBC 4.00 (L) 4.50 -  5.90 x10*6/uL    Hemoglobin 12.5 (L) 13.5 - 17.5 g/dL    Hematocrit 38.1 (L) 41.0 - 52.0 %    MCV 95 80 - 100 fL    MCH 31.3 26.0 - 34.0 pg    MCHC 32.8 32.0 - 36.0 g/dL    RDW 13.5 11.5 - 14.5 %    Platelets 206 150 - 450 x10*3/uL    Neutrophils % 67.3 40.0 - 80.0 %    Immature Granulocytes %, Automated 1.4 (H) 0.0 - 0.9 %    Lymphocytes % 15.5 13.0 - 44.0 %    Monocytes % 11.3 2.0 - 10.0 %    Eosinophils % 3.8 0.0 - 6.0 %    Basophils % 0.7 0.0 - 2.0 %    Neutrophils Absolute 5.96 (H) 1.60 - 5.50 x10*3/uL    Immature Granulocytes Absolute, Automated 0.12 0.00 - 0.50 x10*3/uL    Lymphocytes Absolute 1.37 0.80 - 3.00 x10*3/uL    Monocytes Absolute 1.00 (H) 0.05 - 0.80 x10*3/uL    Eosinophils Absolute 0.34 0.00 - 0.40 x10*3/uL    Basophils Absolute 0.06 0.00 - 0.10 x10*3/uL   CBC   Result Value Ref Range    WBC 8.4 4.4 - 11.3 x10*3/uL    nRBC 0.0 0.0 - 0.0 /100 WBCs    RBC 3.74 (L) 4.50 - 5.90 x10*6/uL    Hemoglobin 11.5 (L) 13.5 - 17.5 g/dL    Hematocrit 35.7 (L) 41.0 - 52.0 %    MCV 96 80 - 100 fL    MCH 30.7 26.0 - 34.0 pg    MCHC 32.2 32.0 - 36.0 g/dL    RDW 13.6 11.5 - 14.5 %    Platelets 213 150 - 450 x10*3/uL   Basic Metabolic Panel   Result Value Ref Range    Glucose 107 (H) 74 - 99 mg/dL    Sodium 133 (L) 136 - 145 mmol/L    Potassium 3.6 3.5 - 5.3 mmol/L    Chloride 99 98 - 107 mmol/L    Bicarbonate 26 21 - 32 mmol/L    Anion Gap 12 10 - 20 mmol/L    Urea Nitrogen 20 6 - 23 mg/dL    Creatinine 0.93 0.50 - 1.30 mg/dL    eGFR 84 >60 mL/min/1.73m*2    Calcium 8.2 (L) 8.6 - 10.3 mg/dL      CT chest w IV contrast    Result Date: 1/13/2024  Interpreted By:  Marcelo Khan, STUDY: CT CHEST W IV CONTRAST;  1/13/2024 6:41 am   INDICATION: Signs/Symptoms:r/o expanding chest hematoma post surgery jugular/carotid injury.   COMPARISON: Chest radiograph 01/13/2024   ACCESSION NUMBER(S): [id number]   ORDERING CLINICIAN: CAMRYN RUDD   TECHNIQUE: Helical data acquisition of the chest was obtained with IV contrast  material. 75 cc IV Omnipaque 350 was administered. Images were reformatted in axial, coronal, and sagittal planes.   FINDINGS: LUNGS AND AIRWAYS: The trachea and central airways are patent. No focal consolidation. No pneumothorax. No pleural effusion. Mild bibasilar atelectasis. No suspicious pulmonary nodules.   MEDIASTINUM AND AILYN, LOWER NECK AND AXILLA: The visualized thyroid gland is within normal limits.   No thoracic lymphadenopathy.   Small to moderate hiatal hernia.   HEART AND VESSELS: 4.1 cm aneurysmal dilation of the ascending thoracic aorta and moderate atherosclerotic calcifications.   Main pulmonary artery and its branches are normal in caliber.   Severe coronary artery calcifications are seen. The study is not optimized for evaluation of coronary arteries.   Mild cardiomegaly.   No evidence of pericardial effusion.   UPPER ABDOMEN: Unremarkable.   CHEST WALL AND OSSEOUS STRUCTURES: Right-sided inspire device placement. There is mild skin thickening and surrounding stranding, likely secondary to small hematoma.       Right chest wall inspire device with surrounding soft tissue stranding. No large hematoma. 4.1 cm aneurysmal dilation ascending thoracic aorta.   Signed by: Marcelo Khan 1/13/2024 9:16 AM Dictation workstation:   [id number]    CT angio neck    Result Date: 1/13/2024  Interpreted By:  Ernesto Warner, STUDY: CT ANGIO NECK   INDICATION: Signs/Symptoms:r/o expanding chest hematoma post surgery jugular/carotid injury   COMPARISON: CTA neck 08/22/2023   ACCESSION NUMBER(S): [id number]   ORDERING CLINICIAN: CAMRYN RUDD   TECHNIQUE: CTA of the neck was performed after the intravenous administration of 75 mL Omnipaque 350 nonionic IV contrast. 3-D reconstructions were performed under physician supervision on a separate workstation and reviewed.   FINDINGS: Status post placement of inspire device with stimulator lead extending from the right sublingual space, through the right anterior  neck, to the right anterior intercostal region. Generator within the right chest wall. Extensive soft tissue swelling surrounding the generator device, suggestive of hematoma formation. Given extensive streak artifact from the metallic generator, unable to accurately measure size of hematoma or reliably assess for active extravasation. Within limitation, no definite extravasated contrast visualized.   CTA NECK:   AORTIC ARCH: The visualized portion of the aortic arch is unremarkable in appearance. The origins of the great vessels and the vertebral arteries are normal without evidence of stenosis.   CERVICAL CAROTID ARTERIES: The common carotid arteries are patent bilaterally without evidence of stenosis. Atherosclerotic disease of bilateral carotid bifurcations without narrowing of the cervical internal carotid arteries bilaterally.   VERTEBRAL ARTERIES: The vertebral arteries are patent bilaterally throughout their course. Right vertebral artery is dominant. Atherosclerotic disease of bilateral vertebral artery origins partially visualized intradural vertebral arteries demonstrates severe atherosclerotic disease bilaterally. Left vertebral artery predominantly terminates as the PICA. Right vertebral artery is dominant.   Postsurgical changes in the right sublingual space, floor of mouth, and submental region.       Status post placement of Inspire device. Swelling surrounding the generator within the right chest wall suggestive of hematoma formation. Given extensive streak artifact from metallic device, measuring size of hematoma or assessment for active extravasation is extremely limited. Within limitation, no definite extraluminal contrast visualized.   Mild atherosclerotic disease of the cervical vasculature as detailed, unchanged from previous CTA 08/22/2023. _____________ NASCET criteria for internal carotid artery stenosis: Mild: 0% to 49% Moderate: 50% to 69% Severe: 70% to 99% Complete Occlusion   Signed  by: Ernesto Warner 1/13/2024 8:41 AM Dictation workstation:   [id number]    XR chest 1 view    Result Date: 1/13/2024  Interpreted By:  Tari Weaver, STUDY: XR CHEST 1 VIEW;  1/13/2024 5:14 am   INDICATION: Signs/Symptoms:Chest Pain.   COMPARISON: 01/08/2024   ACCESSION NUMBER(S): [id number]   ORDERING CLINICIAN: CAMRYN RUDD   FINDINGS: Generator overlying the right chest wall with lead extending into the neck.   CARDIOMEDIASTINAL SILHOUETTE: Cardiomediastinal silhouette is normal in size and configuration.   LUNGS: No pulmonary consolidation, pleural effusion or pneumothorax.   ABDOMEN: No remarkable upper abdominal findings.   BONES: No acute osseous abnormality. Reverse right total shoulder arthroplasty.       No acute cardiopulmonary process.   MACRO: None   Signed by: Tari Weaver 1/13/2024 5:40 AM Dictation workstation:   [id number]      Medications  amLODIPine, 5 mg, oral, Daily  [Held by provider] aspirin, 81 mg, oral, Daily  atorvastatin, 40 mg, oral, Nightly  [Held by provider] clopidogrel, 75 mg, oral, Daily  lidocaine, 1 patch, transdermal, Daily  loratadine, 10 mg, oral, Daily  melatonin, 3 mg, oral, Daily  memantine, 5 mg, oral, BID  montelukast, 10 mg, oral, Nightly  polyethylene glycol, 17 g, oral, Daily  propranolol LA, 60 mg, oral, Daily  topiramate, 25 mg, oral, Nightly  traZODone, 50 mg, oral, Nightly       PRN medications: acetaminophen **OR** acetaminophen **OR** acetaminophen, acetaminophen **OR** acetaminophen **OR** acetaminophen, albuterol, ondansetron **OR** ondansetron, oxyCODONE     Assessment/Plan   Active Problems:    * No active hospital problems. *      78-year-old  male, who is known to history of asthma, sleep apnea, hypertension, hyperlipidemia, came to the emergency department because of right chest wall swelling,.    Right chest wall edema and erythema after the inspire placement, CAT scan of the chest reviewed, hematoma, monitored overnight, will do another  hemoglobin, if it is stable he will be discharged today.  History of TIA on aspirin and Plavix with TIA was in May 2023 he is supposed to take both it was on hold for surgery via keeping it on hold for now.    Essential tremor continue with the Inderal.  Insomnia on meds.  Asthma same  Cognitive impairment same  DVT prophylaxis           I spent 60 minutes in the professional and overall care of this patient.    Agnes Borrero MD

## 2024-01-14 NOTE — CARE PLAN
The patient's goals for the shift include      The clinical goals for the shift include remain comfortable    Problem: Pain - Adult  Goal: Verbalizes/displays adequate comfort level or baseline comfort level  Outcome: Progressing     Problem: Safety - Adult  Goal: Free from fall injury  Outcome: Progressing     Problem: Discharge Planning  Goal: Discharge to home or other facility with appropriate resources  Outcome: Progressing     Problem: Chronic Conditions and Co-morbidities  Goal: Patient's chronic conditions and co-morbidity symptoms are monitored and maintained or improved  Outcome: Progressing     Problem: Fall/Injury  Goal: Not fall by end of shift  Outcome: Progressing  Goal: Be free from injury by end of the shift  Outcome: Progressing  Goal: Verbalize understanding of personal risk factors for fall in the hospital  Outcome: Progressing  Goal: Verbalize understanding of risk factor reduction measures to prevent injury from fall in the home  Outcome: Progressing  Goal: Use assistive devices by end of the shift  Outcome: Progressing  Goal: Pace activities to prevent fatigue by end of the shift  Outcome: Progressing

## 2024-01-15 ENCOUNTER — TELEPHONE (OUTPATIENT)
Dept: OTOLARYNGOLOGY | Facility: CLINIC | Age: 79
End: 2024-01-15
Payer: MEDICARE

## 2024-01-15 DIAGNOSIS — R31.0 GROSS HEMATURIA: Primary | ICD-10-CM

## 2024-01-15 LAB
ATRIAL RATE: 47 BPM
ATRIAL RATE: 55 BPM
P AXIS: 72 DEGREES
P AXIS: 75 DEGREES
P OFFSET: 164 MS
P OFFSET: 173 MS
P ONSET: 127 MS
P ONSET: 130 MS
PR INTERVAL: 190 MS
PR INTERVAL: 190 MS
Q ONSET: 222 MS
Q ONSET: 225 MS
QRS COUNT: 7 BEATS
QRS COUNT: 9 BEATS
QRS DURATION: 112 MS
QRS DURATION: 116 MS
QT INTERVAL: 452 MS
QT INTERVAL: 470 MS
QTC CALCULATION(BAZETT): 415 MS
QTC CALCULATION(BAZETT): 432 MS
QTC FREDERICIA: 433 MS
QTC FREDERICIA: 439 MS
R AXIS: -33 DEGREES
R AXIS: -44 DEGREES
T AXIS: -10 DEGREES
T AXIS: 5 DEGREES
T OFFSET: 451 MS
T OFFSET: 457 MS
VENTRICULAR RATE: 47 BPM
VENTRICULAR RATE: 55 BPM

## 2024-01-15 NOTE — TELEPHONE ENCOUNTER
I reviewed his CT scan and blood work.  No evidence of hematoma.  There is some soft tissue swelling.  I have recommended ice and keep follow-up in 1 week

## 2024-01-15 NOTE — TELEPHONE ENCOUNTER
I spoke with pt.  He is at home. It was not drained or painful. He said it is hard and swollen. Has not been on blood thinners. Only on mucinex for a cold.    Per Dr. Bob pt was told to ice and take it easy. Keep his post op visit for Monday.

## 2024-01-15 NOTE — TELEPHONE ENCOUNTER
S/P Inspire insertion 1/8/2024. Was admitted to [facility] with chest wall hematoma 1/13. He is home. The hospital H&P and testing are in his chart. I did l/m asking him how he is now.

## 2024-01-16 ENCOUNTER — LAB (OUTPATIENT)
Dept: LAB | Facility: LAB | Age: 79
End: 2024-01-16
Payer: MEDICARE

## 2024-01-16 ENCOUNTER — TELEPHONE (OUTPATIENT)
Dept: PRIMARY CARE | Facility: CLINIC | Age: 79
End: 2024-01-16

## 2024-01-16 DIAGNOSIS — R31.0 GROSS HEMATURIA: ICD-10-CM

## 2024-01-16 PROCEDURE — 87086 URINE CULTURE/COLONY COUNT: CPT

## 2024-01-16 NOTE — TELEPHONE ENCOUNTER
Pt.  had a cold over [place] and that it has come back.  has a dry hacking cough. No other symptoms. Asking what you would recommend for him to do.

## 2024-01-17 ENCOUNTER — OFFICE VISIT (OUTPATIENT)
Dept: OTOLARYNGOLOGY | Facility: CLINIC | Age: 79
End: 2024-01-17
Payer: MEDICARE

## 2024-01-17 VITALS — WEIGHT: 188 LBS | BODY MASS INDEX: 24.92 KG/M2 | HEIGHT: 73 IN

## 2024-01-17 DIAGNOSIS — G47.33 OBSTRUCTIVE SLEEP APNEA: Primary | ICD-10-CM

## 2024-01-17 LAB — BACTERIA UR CULT: NORMAL

## 2024-01-17 PROCEDURE — 1160F RVW MEDS BY RX/DR IN RCRD: CPT | Performed by: OTOLARYNGOLOGY

## 2024-01-17 PROCEDURE — 1159F MED LIST DOCD IN RCRD: CPT | Performed by: OTOLARYNGOLOGY

## 2024-01-17 PROCEDURE — 99024 POSTOP FOLLOW-UP VISIT: CPT | Performed by: OTOLARYNGOLOGY

## 2024-01-17 PROCEDURE — 1036F TOBACCO NON-USER: CPT | Performed by: OTOLARYNGOLOGY

## 2024-01-17 PROCEDURE — 1126F AMNT PAIN NOTED NONE PRSNT: CPT | Performed by: OTOLARYNGOLOGY

## 2024-01-17 NOTE — TELEPHONE ENCOUNTER
Please see message below. Pt. Calling back today. States has a little more congestion today along with the dry hacking cough. Please

## 2024-01-17 NOTE — PROGRESS NOTES
Chief Complaint   Patient presents with    Post-op Visit     POST OP INSPIRE, SWELLING STARTED SATURDAY, RESUMED ASPIRIN TODAY      Date of Evaluation: 1/17/2024   HPI  Hank Rick is a 78 y.o. male 10 days status post inspire implantation.  A few days ago he started with a cough.  He then developed a hematoma around the chest.  He went to [facility] and was admitted overnight.  CT scan was not impressive for any significant collection.  He feels like it has gotten somewhat larger over the past 24 hours.  No associated pain.  He does have a history of bleeding.       Past Medical History:   Diagnosis Date    Arthritis     Asthma     Cataract     Cerebral vascular accident (CMS/HCC) 05/09/2023    COVID-19 11/15/2022    COVID-19 virus infection    Esophageal obstruction 05/15/2022    Esophageal stricture    Essential tremor 12/29/2022    Essential tremor    GERD (gastroesophageal reflux disease)     Hard of hearing     Hearing aid worn     Hyperlipidemia     Hypertension     Memory impairment     Olecranon bursitis, left elbow 04/26/2016    Olecranon bursitis of left elbow    Personal history of diseases of the blood and blood-forming organs and certain disorders involving the immune mechanism 05/17/2022    History of anemia    Personal history of other diseases of the digestive system 11/13/2014    History of gastroesophageal reflux (GERD)    Personal history of other diseases of the digestive system 01/14/2021    History of hiatal hernia    Personal history of other specified conditions 11/29/2018    History of urinary frequency    Rash and other nonspecific skin eruption 11/02/2021    Skin rash    Salmonella enteritis     Salmonella enteritis    Sleep apnea     TIA (transient ischemic attack)     Vertebral artery stenosis       Past Surgical History:   Procedure Laterality Date    BACK SURGERY      CATARACT EXTRACTION      CT ANGIO NECK  05/12/2023    CT NECK ANGIO W AND WO IV CONTRAST 5/12/2023 [facility]    CT  HEAD ANGIO W AND WO IV CONTRAST  05/12/2023    CT HEAD ANGIO W AND WO IV CONTRAST 5/12/2023 [facility]    HERNIA REPAIR      MR HEAD ANGIO WO IV CONTRAST  05/09/2023    MR HEAD ANGIO WO IV CONTRAST 5/9/2023 [facility]    MR NECK ANGIO WO IV CONTRAST  05/09/2023    MR NECK ANGIO WO IV CONTRAST 5/9/2023 [facility]    OTHER SURGICAL HISTORY  01/07/2015    Esophagogastric Fundoplasty Laparoscopic For Paraesophageal Hernia Repair    OTHER SURGICAL HISTORY  03/05/2014    Shoulder Surgery Left    OTHER SURGICAL HISTORY      REFRACTIVE SURGERY  03/05/2014    Corneal LASIK    TONSILLECTOMY  11/18/2014    Tonsillectomy    TOTAL SHOULDER ARTHROPLASTY Right     reverse          Medications:   Current Outpatient Medications   Medication Instructions    acetaminophen (TYLENOL 8 HOUR) 1,300 mg, oral, Daily, Do not crush, chew, or split.    albuterol 90 mcg/actuation inhaler 2 puffs, inhalation, Every 4 to 6 hours as needed as directed    amLODIPine (Norvasc) 2.5 mg tablet Take  2 tablets every day , please note change in dose    aspirin 81 mg, oral, Daily, Please re start on 01/17/2024    atorvastatin (LIPITOR) 40 mg, oral, Daily    azelastine (Astelin) 137 mcg (0.1 %) nasal spray 2 sprays, Each Nostril, 2 times daily    clopidogrel (PLAVIX) 75 mg, oral, Daily, Please start on 01/17/2024    diclofenac sodium (Voltaren) 1 % gel gel Topical, 2 times daily PRN    fexofenadine (Allegra) 180 mg tablet 1 tablet, oral, Daily    fluticasone (Flonase) 50 mcg/actuation nasal spray 1-2 sprays, Each Nostril, Daily    lidocaine 4 % patch APPLY 1 PATCH TOPICALLY TO AFFECTED AREA ONCE DAILY. LEAVE IN PLACE FOR 12 HOURS, THEN REMOVE AND KEEP OFF FOR 12 HOURS.    meloxicam (Mobic) 15 mg tablet take 1 tablet by mouth once daily if needed with food for MODERATE PAIN ( PAIN SCALE 4-6 )    memantine (NAMENDA) 5 mg, oral, 2 times daily    montelukast (SINGULAIR) 10 mg, oral, Daily    omeprazole OTC (PriLOSEC OTC) 20 mg EC tablet 1 tablet, oral, Daily     "propranolol (Inderal) 10 mg tablet 1 tablet, oral, 3 times daily    propranolol LA (INDERAL LA) 60 mg, oral, Daily    sildenafil (VIAGRA)  mg, oral, As needed    topiramate (Topamax) 25 mg tablet 1-2 tablets, oral, Nightly PRN    traZODone (DESYREL) 50 mg, oral, Nightly        Allergies:  Allergies   Allergen Reactions    Pollen Extracts Other     Runny nose    Gabapentin Anxiety        Physical Exam:  Last Recorded Vitals  Height 1.854 m (6' 1\"), weight 85.3 kg (188 lb).  The cervical incision is healing well.  No hematoma in the neck.  A fair amount of ecchymosis in the neck and chest.  There is a definite hematoma over the chest wall implant.  It is nontender.        Hank was seen today for post-op visit.  Diagnoses and all orders for this visit:  Obstructive sleep apnea (Primary)       PLAN  I have marked the edges of the hematoma and I would like him to keep ice on the area.  I will treat him with Keflex prophylactically to hopefully not allow for infection.  We discussed aspirating the hematoma versus observation.  I would like to minimize the risk for infection and will favor observation.  I will see him back in 4 days unless this is getting larger and he will return to me for drainage    Ben Bob MD    "

## 2024-01-20 NOTE — DISCHARGE SUMMARY
Discharge Diagnosis  Chest wall hematoma, right, initial encounter    Issues Requiring Follow-Up  Pcp , regarding restarting the antiplatelet therapy, the patient does have 4.1 cm aortic aneurysm    Test Results Pending At Discharge  Pending Labs       No current pending labs.            Hospital Course     78-year-old  male, who is known to history of asthma, sleep apnea, hypertension, hyperlipidemia, came to the emergency department because of right chest wall swelling,.     Right chest wall edema and erythema after the inspire placement, CAT scan of the chest reviewed, hematoma, monitored overnight, will do another hemoglobin, if it is stable he will be discharged today.  History of TIA on aspirin and Plavix with TIA was in May 2023 he is supposed to take both it was on hold for surgery via keeping it on hold for now.     Essential tremor continue with the Inderal.  Insomnia on meds.  Asthma same         Home Medications     Medication List      CHANGE how you take these medications     aspirin 81 mg EC tablet; Take 1 tablet (81 mg) by mouth once daily.   Please re start on 01/17/2024; What changed: additional instructions   clopidogrel 75 mg tablet; Commonly known as: Plavix; Take 1 tablet (75   mg) by mouth once daily. Please start on 01/17/2024; What changed:   additional instructions   Lidocaine Pain Relief 4 % patch; Generic drug: lidocaine; APPLY 1 PATCH   TOPICALLY TO AFFECTED AREA ONCE DAILY. LEAVE IN PLACE FOR 12 HOURS, THEN   REMOVE AND KEEP OFF FOR 12 HOURS.; What changed: when to take this,   reasons to take this     CONTINUE taking these medications     acetaminophen 650 mg ER tablet; Commonly known as: Tylenol 8 HOUR   albuterol 90 mcg/actuation inhaler   amLODIPine 2.5 mg tablet; Commonly known as: Norvasc; Take  2 tablets   every day , please note change in dose   atorvastatin 40 mg tablet; Commonly known as: Lipitor; Take 1 tablet (40   mg) by mouth once daily.   azelastine 137 mcg (0.1  %) nasal spray; Commonly known as: Astelin;   Administer 2 sprays into each nostril 2 times a day.   diclofenac sodium 1 % gel gel; Commonly known as: Voltaren   fexofenadine 180 mg tablet; Commonly known as: Allegra   fluticasone 50 mcg/actuation nasal spray; Commonly known as: Flonase   meloxicam 15 mg tablet; Commonly known as: Mobic; take 1 tablet by mouth   once daily if needed with food for MODERATE PAIN ( PAIN SCALE 4-6 )   memantine 5 mg tablet; Commonly known as: Namenda; Take 1 tablet (5 mg)   by mouth 2 times a day.   montelukast 10 mg tablet; Commonly known as: Singulair; Take 1 tablet   (10 mg) by mouth once daily.   omeprazole OTC 20 mg EC tablet; Commonly known as: PriLOSEC OTC   * propranolol 10 mg tablet; Commonly known as: Inderal   * propranolol LA 60 mg 24 hr capsule; Commonly known as: Inderal LA;   Take 1 capsule (60 mg) by mouth once daily.   sildenafil 100 mg tablet; Commonly known as: Viagra; Take 0.5-1 tablets   ( mg) by mouth if needed for erectile dysfunction (1 hour prior to   sexual activity (max 1 per day)).   topiramate 25 mg tablet; Commonly known as: Topamax   traZODone 50 mg tablet; Commonly known as: Desyrel; Take 1 tablet (50   mg) by mouth once daily at bedtime.  * This list has 2 medication(s) that are the same as other medications   prescribed for you. Read the directions carefully, and ask your doctor or   other care provider to review them with you.       Outpatient Follow-Up  Future Appointments   Date Time Provider Department Center   1/22/2024  2:45 PM Ben Bob MD [id number] [facility]   2/2/2024  9:10 AM Olga Alaniz MD [id number] [facility]   2/23/2024 10:00 AM Stan Lopez MD [id number] [facility]   3/14/2024  1:00 PM Darek Sánchez MD PhD [id number] [facility]   4/3/2024 12:50 PM MD BETTY [id number] [facility]       Agnes Borrero MD

## 2024-01-22 ENCOUNTER — OFFICE VISIT (OUTPATIENT)
Dept: OTOLARYNGOLOGY | Facility: CLINIC | Age: 79
End: 2024-01-22
Payer: MEDICARE

## 2024-01-22 VITALS — HEIGHT: 73 IN | WEIGHT: 188 LBS | TEMPERATURE: 97.3 F | BODY MASS INDEX: 24.92 KG/M2

## 2024-01-22 DIAGNOSIS — G47.33 OBSTRUCTIVE SLEEP APNEA: Primary | ICD-10-CM

## 2024-01-22 PROCEDURE — 1126F AMNT PAIN NOTED NONE PRSNT: CPT | Performed by: OTOLARYNGOLOGY

## 2024-01-22 PROCEDURE — 1159F MED LIST DOCD IN RCRD: CPT | Performed by: OTOLARYNGOLOGY

## 2024-01-22 PROCEDURE — 1160F RVW MEDS BY RX/DR IN RCRD: CPT | Performed by: OTOLARYNGOLOGY

## 2024-01-22 PROCEDURE — 99024 POSTOP FOLLOW-UP VISIT: CPT | Performed by: OTOLARYNGOLOGY

## 2024-01-22 PROCEDURE — 1036F TOBACCO NON-USER: CPT | Performed by: OTOLARYNGOLOGY

## 2024-01-22 NOTE — PROGRESS NOTES
Chief Complaint   Patient presents with    Follow-up     1 WK F/U INSPIRE, PT STATES IT IS THE SAME LAST WEEK, HAS BEEN ICING      Date of Evaluation: 1/22/2024   HPI  He returns in follow-up for a chest wall hematoma.  This has not gotten any larger.  No redness or signs of infection.  His cough is improving.  He is on Keflex  Hank L Merline is a 78 y.o. male 10 days status post inspire implantation.  A few days ago he started with a cough.  He then developed a hematoma around the chest.  He went to [facility] and was admitted overnight.  CT scan was not impressive for any significant collection.  He feels like it has gotten somewhat larger over the past 24 hours.  No associated pain.  He does have a history of bleeding.       Past Medical History:   Diagnosis Date    Arthritis     Asthma     Cataract     Cerebral vascular accident (CMS/HCC) 05/09/2023    COVID-19 11/15/2022    COVID-19 virus infection    Esophageal obstruction 05/15/2022    Esophageal stricture    Essential tremor 12/29/2022    Essential tremor    GERD (gastroesophageal reflux disease)     Hard of hearing     Hearing aid worn     Hyperlipidemia     Hypertension     Memory impairment     Olecranon bursitis, left elbow 04/26/2016    Olecranon bursitis of left elbow    Personal history of diseases of the blood and blood-forming organs and certain disorders involving the immune mechanism 05/17/2022    History of anemia    Personal history of other diseases of the digestive system 11/13/2014    History of gastroesophageal reflux (GERD)    Personal history of other diseases of the digestive system 01/14/2021    History of hiatal hernia    Personal history of other specified conditions 11/29/2018    History of urinary frequency    Rash and other nonspecific skin eruption 11/02/2021    Skin rash    Salmonella enteritis     Salmonella enteritis    Sleep apnea     TIA (transient ischemic attack)     Vertebral artery stenosis       Past Surgical History:    Procedure Laterality Date    BACK SURGERY      CATARACT EXTRACTION      CT ANGIO NECK  05/12/2023    CT NECK ANGIO W AND WO IV CONTRAST 5/12/2023 [facility] CT    CT HEAD ANGIO W AND WO IV CONTRAST  05/12/2023    CT HEAD ANGIO W AND WO IV CONTRAST 5/12/2023 [facility] CT    HERNIA REPAIR      MR HEAD ANGIO WO IV CONTRAST  05/09/2023    MR HEAD ANGIO WO IV CONTRAST 5/9/2023 [facility]    MR NECK ANGIO WO IV CONTRAST  05/09/2023    MR NECK ANGIO WO IV CONTRAST 5/9/2023 [facility]    OTHER SURGICAL HISTORY  01/07/2015    Esophagogastric Fundoplasty Laparoscopic For Paraesophageal Hernia Repair    OTHER SURGICAL HISTORY  03/05/2014    Shoulder Surgery Left    OTHER SURGICAL HISTORY      REFRACTIVE SURGERY  03/05/2014    Corneal LASIK    TONSILLECTOMY  11/18/2014    Tonsillectomy    TOTAL SHOULDER ARTHROPLASTY Right     reverse          Medications:   Current Outpatient Medications   Medication Instructions    acetaminophen (TYLENOL 8 HOUR) 1,300 mg, oral, Daily, Do not crush, chew, or split.    albuterol 90 mcg/actuation inhaler 2 puffs, inhalation, Every 4 to 6 hours as needed as directed    amLODIPine (Norvasc) 2.5 mg tablet Take  2 tablets every day , please note change in dose    aspirin 81 mg, oral, Daily, Please re start on 01/17/2024    atorvastatin (LIPITOR) 40 mg, oral, Daily    azelastine (Astelin) 137 mcg (0.1 %) nasal spray 2 sprays, Each Nostril, 2 times daily    clopidogrel (PLAVIX) 75 mg, oral, Daily, Please start on 01/17/2024    diclofenac sodium (Voltaren) 1 % gel gel Topical, 2 times daily PRN    fexofenadine (Allegra) 180 mg tablet 1 tablet, oral, Daily    fluticasone (Flonase) 50 mcg/actuation nasal spray 1-2 sprays, Each Nostril, Daily    lidocaine 4 % patch APPLY 1 PATCH TOPICALLY TO AFFECTED AREA ONCE DAILY. LEAVE IN PLACE FOR 12 HOURS, THEN REMOVE AND KEEP OFF FOR 12 HOURS.    meloxicam (Mobic) 15 mg tablet take 1 tablet by mouth once daily if needed with food for MODERATE PAIN ( PAIN SCALE 4-6 )    memantine  "(NAMENDA) 5 mg, oral, 2 times daily    montelukast (SINGULAIR) 10 mg, oral, Daily    omeprazole OTC (PriLOSEC OTC) 20 mg EC tablet 1 tablet, oral, Daily    propranolol (Inderal) 10 mg tablet 1 tablet, oral, 3 times daily    propranolol LA (INDERAL LA) 60 mg, oral, Daily    sildenafil (VIAGRA)  mg, oral, As needed    topiramate (Topamax) 25 mg tablet 1-2 tablets, oral, Nightly PRN    traZODone (DESYREL) 50 mg, oral, Nightly        Allergies:  Allergies   Allergen Reactions    Pollen Extracts Other     Runny nose    Gabapentin Anxiety        Physical Exam:  Last Recorded Vitals  Temperature 36.3 °C (97.3 °F), height 1.854 m (6' 1\"), weight 85.3 kg (188 lb).  The cervical incision is healing well.  No hematoma in the neck.  A fair amount of ecchymosis in the neck and chest.  There is a definite hematoma over the chest wall implant.  It is nontender.  No change        There are no diagnoses linked to this encounter.       PLAN  No further extension of the hematoma.  He will start applying heat.  Complete the course of Keflex.  Follow-up in 2 weeks.  If the hematoma enlarges he will contact me and we will consider drainage    Ben Bob MD  "

## 2024-01-24 ENCOUNTER — TELEPHONE (OUTPATIENT)
Dept: SLEEP MEDICINE | Facility: HOSPITAL | Age: 79
End: 2024-01-24
Payer: MEDICARE

## 2024-01-24 NOTE — TELEPHONE ENCOUNTER
Patient called wanting to move his Inspire Activation appointment sooner due to going out of town on the day of his original appointment. I am waiting to hear back from the Inspire team if Feb. 2, 2024 is a good day.     Constanza COON

## 2024-01-25 ENCOUNTER — TELEPHONE (OUTPATIENT)
Dept: OTOLARYNGOLOGY | Facility: CLINIC | Age: 79
End: 2024-01-25
Payer: MEDICARE

## 2024-01-25 NOTE — TELEPHONE ENCOUNTER
S/P Inspire 1/8/2024. Had an activation set up, but realized it will be when he is scheduled to be out of town. He rescheduled to 2/2/2024 - is that too soon?

## 2024-01-29 ENCOUNTER — OFFICE VISIT (OUTPATIENT)
Dept: OTOLARYNGOLOGY | Facility: CLINIC | Age: 79
End: 2024-01-29
Payer: MEDICARE

## 2024-01-29 VITALS — HEIGHT: 73 IN | BODY MASS INDEX: 24.92 KG/M2 | WEIGHT: 188 LBS

## 2024-01-29 DIAGNOSIS — G47.33 OBSTRUCTIVE SLEEP APNEA: Primary | ICD-10-CM

## 2024-01-29 PROCEDURE — 99024 POSTOP FOLLOW-UP VISIT: CPT | Performed by: OTOLARYNGOLOGY

## 2024-01-29 PROCEDURE — 1159F MED LIST DOCD IN RCRD: CPT | Performed by: OTOLARYNGOLOGY

## 2024-01-29 PROCEDURE — 1126F AMNT PAIN NOTED NONE PRSNT: CPT | Performed by: OTOLARYNGOLOGY

## 2024-01-29 PROCEDURE — 1036F TOBACCO NON-USER: CPT | Performed by: OTOLARYNGOLOGY

## 2024-01-29 NOTE — PROGRESS NOTES
Chief Complaint   Patient presents with    Post-op     DOS: 1/18/24 INSPIRE      Date of Evaluation: 1/29/2024   HPI  He returns in follow-up for a chest wall hematoma.  His cough is better.  The hematoma is starting to resorb.  It is much softer.    Hank Rick is a 78 y.o. male 10 days status post inspire implantation.  A few days ago he started with a cough.  He then developed a hematoma around the chest.  He went to [facility] and was admitted overnight.  CT scan was not impressive for any significant collection.  He feels like it has gotten somewhat larger over the past 24 hours.  No associated pain.  He does have a history of bleeding.       Past Medical History:   Diagnosis Date    Arthritis     Asthma     Cataract     Cerebral vascular accident (CMS/HCC) 05/09/2023    COVID-19 11/15/2022    COVID-19 virus infection    Esophageal obstruction 05/15/2022    Esophageal stricture    Essential tremor 12/29/2022    Essential tremor    GERD (gastroesophageal reflux disease)     Hard of hearing     Hearing aid worn     Hyperlipidemia     Hypertension     Memory impairment     Olecranon bursitis, left elbow 04/26/2016    Olecranon bursitis of left elbow    Personal history of diseases of the blood and blood-forming organs and certain disorders involving the immune mechanism 05/17/2022    History of anemia    Personal history of other diseases of the digestive system 11/13/2014    History of gastroesophageal reflux (GERD)    Personal history of other diseases of the digestive system 01/14/2021    History of hiatal hernia    Personal history of other specified conditions 11/29/2018    History of urinary frequency    Rash and other nonspecific skin eruption 11/02/2021    Skin rash    Salmonella enteritis     Salmonella enteritis    Sleep apnea     TIA (transient ischemic attack)     Vertebral artery stenosis       Past Surgical History:   Procedure Laterality Date    BACK SURGERY      CATARACT EXTRACTION      CT  ANGIO NECK  05/12/2023    CT NECK ANGIO W AND WO IV CONTRAST 5/12/2023 [facility]    CT HEAD ANGIO W AND WO IV CONTRAST  05/12/2023    CT HEAD ANGIO W AND WO IV CONTRAST 5/12/2023 [facility] CT    HERNIA REPAIR      MR HEAD ANGIO WO IV CONTRAST  05/09/2023    MR HEAD ANGIO WO IV CONTRAST 5/9/2023 [facility] MRI    MR NECK ANGIO WO IV CONTRAST  05/09/2023    MR NECK ANGIO WO IV CONTRAST 5/9/2023 [facility]    OTHER SURGICAL HISTORY  01/07/2015    Esophagogastric Fundoplasty Laparoscopic For Paraesophageal Hernia Repair    OTHER SURGICAL HISTORY  03/05/2014    Shoulder Surgery Left    OTHER SURGICAL HISTORY      REFRACTIVE SURGERY  03/05/2014    Corneal LASIK    TONSILLECTOMY  11/18/2014    Tonsillectomy    TOTAL SHOULDER ARTHROPLASTY Right     reverse          Medications:   Current Outpatient Medications   Medication Instructions    acetaminophen (TYLENOL 8 HOUR) 1,300 mg, oral, Daily, Do not crush, chew, or split.    albuterol 90 mcg/actuation inhaler 2 puffs, inhalation, Every 4 to 6 hours as needed as directed    amLODIPine (Norvasc) 2.5 mg tablet Take  2 tablets every day , please note change in dose    aspirin 81 mg, oral, Daily, Please re start on 01/17/2024    atorvastatin (LIPITOR) 40 mg, oral, Daily    azelastine (Astelin) 137 mcg (0.1 %) nasal spray 2 sprays, Each Nostril, 2 times daily    clopidogrel (PLAVIX) 75 mg, oral, Daily, Please start on 01/17/2024    diclofenac sodium (Voltaren) 1 % gel gel Topical, 2 times daily PRN    fexofenadine (Allegra) 180 mg tablet 1 tablet, oral, Daily    fluticasone (Flonase) 50 mcg/actuation nasal spray 1-2 sprays, Each Nostril, Daily    lidocaine 4 % patch APPLY 1 PATCH TOPICALLY TO AFFECTED AREA ONCE DAILY. LEAVE IN PLACE FOR 12 HOURS, THEN REMOVE AND KEEP OFF FOR 12 HOURS.    meloxicam (Mobic) 15 mg tablet take 1 tablet by mouth once daily if needed with food for MODERATE PAIN ( PAIN SCALE 4-6 )    memantine (NAMENDA) 5 mg, oral, 2 times daily    montelukast (SINGULAIR) 10 mg, oral,  "Daily    omeprazole OTC (PriLOSEC OTC) 20 mg EC tablet 1 tablet, oral, Daily    propranolol (Inderal) 10 mg tablet 1 tablet, oral, 3 times daily    propranolol LA (INDERAL LA) 60 mg, oral, Daily    sildenafil (VIAGRA)  mg, oral, As needed    topiramate (Topamax) 25 mg tablet 1-2 tablets, oral, Nightly PRN    traZODone (DESYREL) 50 mg, oral, Nightly        Allergies:  Allergies   Allergen Reactions    Pollen Extracts Other     Runny nose    Gabapentin Anxiety        Physical Exam:  Last Recorded Vitals  Height 1.854 m (6' 1\"), weight 85.3 kg (188 lb).  The cervical incision is healing well.  No hematoma in the neck.  Resolved ecchymosis in the neck and still some residual on the chest.  The hematoma over the chest wall implant is softening up and resorbing.  It is still large.  It is nontender.  Improved        Hank was seen today for post-op.  Diagnoses and all orders for this visit:  Obstructive sleep apnea (Primary)         PLAN  He can proceed with activation of inspire.  Recheck in 6 weeks.  Resorbing hematoma    Ben Bob MD  "

## 2024-02-01 ENCOUNTER — TELEPHONE (OUTPATIENT)
Dept: PRIMARY CARE | Facility: CLINIC | Age: 79
End: 2024-02-01
Payer: MEDICARE

## 2024-02-02 ENCOUNTER — APPOINTMENT (OUTPATIENT)
Dept: SLEEP MEDICINE | Facility: CLINIC | Age: 79
End: 2024-02-02
Payer: MEDICARE

## 2024-02-02 ENCOUNTER — APPOINTMENT (OUTPATIENT)
Dept: UROLOGY | Facility: CLINIC | Age: 79
End: 2024-02-02
Payer: MEDICARE

## 2024-02-05 DIAGNOSIS — J45.909 ASTHMA, UNSPECIFIED ASTHMA SEVERITY, UNSPECIFIED WHETHER COMPLICATED, UNSPECIFIED WHETHER PERSISTENT (HHS-HCC): ICD-10-CM

## 2024-02-05 DIAGNOSIS — G45.9 TIA (TRANSIENT ISCHEMIC ATTACK): ICD-10-CM

## 2024-02-05 DIAGNOSIS — I65.02 VERTEBRAL ARTERY STENOSIS/OCCLUSION, LEFT: Primary | ICD-10-CM

## 2024-02-07 ENCOUNTER — OFFICE VISIT (OUTPATIENT)
Dept: UROLOGY | Facility: CLINIC | Age: 79
End: 2024-02-07
Payer: MEDICARE

## 2024-02-07 VITALS — WEIGHT: 188.4 LBS | TEMPERATURE: 97 F | HEIGHT: 73 IN | BODY MASS INDEX: 24.97 KG/M2

## 2024-02-07 DIAGNOSIS — R39.15 BENIGN PROSTATIC HYPERPLASIA (BPH) WITH URINARY URGENCY: Primary | ICD-10-CM

## 2024-02-07 DIAGNOSIS — N40.1 BENIGN PROSTATIC HYPERPLASIA (BPH) WITH URINARY URGENCY: Primary | ICD-10-CM

## 2024-02-07 DIAGNOSIS — N39.41 URGE INCONTINENCE OF URINE: ICD-10-CM

## 2024-02-07 LAB
POC APPEARANCE, URINE: CLEAR
POC BILIRUBIN, URINE: NEGATIVE
POC BLOOD, URINE: ABNORMAL
POC COLOR, URINE: YELLOW
POC GLUCOSE, URINE: NEGATIVE MG/DL
POC KETONES, URINE: NEGATIVE MG/DL
POC LEUKOCYTES, URINE: ABNORMAL
POC NITRITE,URINE: NEGATIVE
POC PH, URINE: 7 PH
POC PROTEIN, URINE: NEGATIVE MG/DL
POC SPECIFIC GRAVITY, URINE: 1.02
POC UROBILINOGEN, URINE: 0.2 EU/DL

## 2024-02-07 PROCEDURE — 1036F TOBACCO NON-USER: CPT | Performed by: UROLOGY

## 2024-02-07 PROCEDURE — 1159F MED LIST DOCD IN RCRD: CPT | Performed by: UROLOGY

## 2024-02-07 PROCEDURE — 51741 ELECTRO-UROFLOWMETRY FIRST: CPT | Performed by: UROLOGY

## 2024-02-07 PROCEDURE — 1126F AMNT PAIN NOTED NONE PRSNT: CPT | Performed by: UROLOGY

## 2024-02-07 PROCEDURE — 81002 URINALYSIS NONAUTO W/O SCOPE: CPT | Performed by: UROLOGY

## 2024-02-07 PROCEDURE — 51798 US URINE CAPACITY MEASURE: CPT | Performed by: UROLOGY

## 2024-02-07 PROCEDURE — 99024 POSTOP FOLLOW-UP VISIT: CPT | Performed by: UROLOGY

## 2024-02-07 NOTE — PROGRESS NOTES
Subjective   Hank Rick is a 78 y.o. male with history of BPH s/p HoLEP on 4/2021 with Dr. Ohara, patient was having persistent OAB post-operatively. He is now s/p Bladder Botox injections and removal of residual adenoma on 1/4/2024, he presents today for a follow up visit. Patient reports persistent frequency, urgency and nocturia x3. Patient had inspire implant placed due to WALKER, however, due to complications it was not activated. Patient denies any gross hematuria, dysuria, nausea, vomiting, fevers or chills.       Past Medical History:   Diagnosis Date    Arthritis     Asthma     Cataract     Cerebral vascular accident (CMS/HCC) 05/09/2023    COVID-19 11/15/2022    COVID-19 virus infection    Esophageal obstruction 05/15/2022    Esophageal stricture    Essential tremor 12/29/2022    Essential tremor    GERD (gastroesophageal reflux disease)     Hard of hearing     Hearing aid worn     Hyperlipidemia     Hypertension     Memory impairment     Olecranon bursitis, left elbow 04/26/2016    Olecranon bursitis of left elbow    Personal history of diseases of the blood and blood-forming organs and certain disorders involving the immune mechanism 05/17/2022    History of anemia    Personal history of other diseases of the digestive system 11/13/2014    History of gastroesophageal reflux (GERD)    Personal history of other diseases of the digestive system 01/14/2021    History of hiatal hernia    Personal history of other specified conditions 11/29/2018    History of urinary frequency    Rash and other nonspecific skin eruption 11/02/2021    Skin rash    Salmonella enteritis     Salmonella enteritis    Sleep apnea     TIA (transient ischemic attack)     Vertebral artery stenosis      Past Surgical History:   Procedure Laterality Date    BACK SURGERY      CATARACT EXTRACTION      CT ANGIO NECK  05/12/2023    CT NECK ANGIO W AND WO IV CONTRAST 5/12/2023 [facility] CT    CT HEAD ANGIO W AND WO IV CONTRAST  05/12/2023    CT HEAD  ANGIO W AND WO IV CONTRAST 5/12/2023 [facility] CT    HERNIA REPAIR      MR HEAD ANGIO WO IV CONTRAST  05/09/2023    MR HEAD ANGIO WO IV CONTRAST 5/9/2023 [facility] MRI    MR NECK ANGIO WO IV CONTRAST  05/09/2023    MR NECK ANGIO WO IV CONTRAST 5/9/2023 [facility] MRI    OTHER SURGICAL HISTORY  01/07/2015    Esophagogastric Fundoplasty Laparoscopic For Paraesophageal Hernia Repair    OTHER SURGICAL HISTORY  03/05/2014    Shoulder Surgery Left    OTHER SURGICAL HISTORY      REFRACTIVE SURGERY  03/05/2014    Corneal LASIK    TONSILLECTOMY  11/18/2014    Tonsillectomy    TOTAL SHOULDER ARTHROPLASTY Right     reverse     Family History   Problem Relation Name Age of Onset    Multiple sclerosis Mother      Colon cancer Father       Current Outpatient Medications   Medication Sig Dispense Refill    acetaminophen (Tylenol 8 HOUR) 650 mg ER tablet Take 2 tablets (1,300 mg) by mouth once daily. Do not crush, chew, or split.      albuterol 90 mcg/actuation inhaler Inhale 2 puffs. Every 4 to 6 hours as needed as directed      amLODIPine (Norvasc) 2.5 mg tablet Take  2 tablets every day , please note change in dose 180 tablet 1    aspirin 81 mg EC tablet Take 1 tablet (81 mg) by mouth once daily. Please re start on 01/17/2024 30 tablet 0    atorvastatin (Lipitor) 40 mg tablet Take 1 tablet (40 mg) by mouth once daily. 90 tablet 2    azelastine (Astelin) 137 mcg (0.1 %) nasal spray Administer 2 sprays into each nostril 2 times a day. 30 mL 5    clopidogrel (Plavix) 75 mg tablet Take 1 tablet (75 mg) by mouth once daily. Please start on 01/17/2024 30 tablet 0    diclofenac sodium (Voltaren) 1 % gel gel Apply topically 2 times a day as needed.      fexofenadine (Allegra) 180 mg tablet Take 1 tablet (180 mg) by mouth once daily.      fluticasone (Flonase) 50 mcg/actuation nasal spray Administer 1-2 sprays into each nostril once daily.      meloxicam (Mobic) 15 mg tablet take 1 tablet by mouth once daily if needed with food for MODERATE PAIN ( PAIN  SCALE 4-6 ) 30 tablet 0    memantine (Namenda) 5 mg tablet Take 1 tablet (5 mg) by mouth 2 times a day. 180 tablet 0    montelukast (Singulair) 10 mg tablet Take 1 tablet (10 mg) by mouth once daily. 90 tablet 2    omeprazole OTC (PriLOSEC OTC) 20 mg EC tablet Take 1 tablet (20 mg) by mouth once daily.      propranolol (Inderal) 10 mg tablet Take 1 tablet (10 mg) by mouth 3 times a day.      propranolol LA (Inderal LA) 60 mg 24 hr capsule Take 1 capsule (60 mg) by mouth once daily. 90 capsule 1    sildenafil (Viagra) 100 mg tablet Take 0.5-1 tablets ( mg) by mouth if needed for erectile dysfunction (1 hour prior to sexual activity (max 1 per day)). 30 tablet 6    topiramate (Topamax) 25 mg tablet Take 1-2 tablets (25-50 mg) by mouth as needed at bedtime.      traZODone (Desyrel) 50 mg tablet Take 1 tablet (50 mg) by mouth once daily at bedtime. 90 tablet 2    lidocaine 4 % patch APPLY 1 PATCH TOPICALLY TO AFFECTED AREA ONCE DAILY. LEAVE IN PLACE FOR 12 HOURS, THEN REMOVE AND KEEP OFF FOR 12 HOURS. (Patient not taking: Reported on 2/7/2024) 15 patch 0     No current facility-administered medications for this visit.     Allergies   Allergen Reactions    Pollen Extracts Other     Runny nose    Gabapentin Anxiety     Social History     Socioeconomic History    Marital status:      Spouse name: Not on file    Number of children: Not on file    Years of education: Not on file    Highest education level: Not on file   Occupational History    Not on file   Tobacco Use    Smoking status: Never    Smokeless tobacco: Never   Vaping Use    Vaping Use: Never used   Substance and Sexual Activity    Alcohol use: Yes     Alcohol/week: 4.0 standard drinks of alcohol     Types: 4 Shots of liquor per week    Drug use: Not Currently     Types: Marijuana     Comment: PAST USE 20 YEARS AGO    Sexual activity: Not on file   Other Topics Concern    Not on file   Social History Narrative    Not on file     Social Determinants  of Health     Financial Resource Strain: Low Risk  (1/13/2024)    Overall Financial Resource Strain (CARDIA)     Difficulty of Paying Living Expenses: Not hard at all   Food Insecurity: Not on file   Transportation Needs: No Transportation Needs (1/13/2024)    PRAPARE - Transportation     Lack of Transportation (Medical): No     Lack of Transportation (Non-Medical): No   Physical Activity: Not on file   Stress: Not on file   Social Connections: Not on file   Intimate Partner Violence: Not on file   Housing Stability: Low Risk  (1/13/2024)    Housing Stability Vital Sign     Unable to Pay for Housing in the Last Year: No     Number of Places Lived in the Last Year: 1     Unstable Housing in the Last Year: No       Review of Systems  Pertinent items are noted in HPI.    Objective     Lab Review  Lab Results   Component Value Date    WBC 8.4 01/14/2024    RBC 3.74 (L) 01/14/2024    HGB 11.7 (L) 01/14/2024    HCT 35.4 (L) 01/14/2024     01/14/2024      Lab Results   Component Value Date    BUN 20 01/14/2024    CREATININE 0.93 01/14/2024      Lab Results   Component Value Date    PSA 0.62 12/05/2022     PVR 162mL   IPSS 7 and 4   Uroflow study demonstrated voided volume of 75 and maximal flow rate of 8.4ml/s.   Urine analysis shows +leukocytes, +blood    Assessment/Plan   Diagnoses and all orders for this visit:  Benign prostatic hyperplasia (BPH) with urinary urgency  -     Measure post void residual  -     POCT UA (nonautomated) manually resulted  -     Urine flow measurement  Urge incontinence of urine    BPH s/p HoLEP on 4/2021 s/p bladder Botox injections and removal of residual adenoma with Holmium laser on 1/4/2024     During Botox injections procedure patient was found to have residual adenoma anteriorly which was partially ablated, Botox was then administered.    I explained to the patient that obstructive urinary symptoms are expected to be present post-procedure.     We will continue to monitor and  follow up in 2 months.     If patients symptoms do not improve we will proceed with cystoscopy for further evaluation of channel patency.     All questions were answered to the patient's satisfaction. Patient agrees with the plan and wishes to proceed. Follow-up will be scheduled appropriately.     Scribed for Dr. Alaniz by Flaquita Araiza. I , Dr Alaniz, have personally reviewed and agreed with the information entered by the Virtual Scribe.

## 2024-02-20 DIAGNOSIS — G31.84 MILD COGNITIVE IMPAIRMENT: Primary | ICD-10-CM

## 2024-02-22 RX ORDER — PROPRANOLOL HYDROCHLORIDE 10 MG/1
10 TABLET ORAL 3 TIMES DAILY
Qty: 180 TABLET | Refills: 1 | Status: SHIPPED | OUTPATIENT
Start: 2024-02-22 | End: 2024-03-14 | Stop reason: SINTOL

## 2024-02-23 ENCOUNTER — APPOINTMENT (OUTPATIENT)
Dept: SLEEP MEDICINE | Facility: CLINIC | Age: 79
End: 2024-02-23
Payer: MEDICARE

## 2024-03-04 ENCOUNTER — APPOINTMENT (OUTPATIENT)
Dept: OTOLARYNGOLOGY | Facility: CLINIC | Age: 79
End: 2024-03-04
Payer: MEDICARE

## 2024-03-08 ENCOUNTER — OFFICE VISIT (OUTPATIENT)
Dept: SLEEP MEDICINE | Facility: CLINIC | Age: 79
End: 2024-03-08
Payer: MEDICARE

## 2024-03-08 VITALS
BODY MASS INDEX: 25.09 KG/M2 | TEMPERATURE: 98 F | WEIGHT: 190.2 LBS | DIASTOLIC BLOOD PRESSURE: 81 MMHG | HEART RATE: 77 BPM | SYSTOLIC BLOOD PRESSURE: 139 MMHG

## 2024-03-08 DIAGNOSIS — G47.33 OSA (OBSTRUCTIVE SLEEP APNEA): Primary | ICD-10-CM

## 2024-03-08 DIAGNOSIS — F51.04 CHRONIC INSOMNIA: ICD-10-CM

## 2024-03-08 PROBLEM — G47.30 SLEEP APNEA: Status: RESOLVED | Noted: 2023-09-29 | Resolved: 2024-03-08

## 2024-03-08 PROBLEM — G47.00 INSOMNIA: Status: RESOLVED | Noted: 2023-09-29 | Resolved: 2024-03-08

## 2024-03-08 PROCEDURE — 95977 ALYS CPLX CN NPGT PRGRMG: CPT | Performed by: STUDENT IN AN ORGANIZED HEALTH CARE EDUCATION/TRAINING PROGRAM

## 2024-03-08 PROCEDURE — 3075F SYST BP GE 130 - 139MM HG: CPT | Performed by: STUDENT IN AN ORGANIZED HEALTH CARE EDUCATION/TRAINING PROGRAM

## 2024-03-08 PROCEDURE — 1159F MED LIST DOCD IN RCRD: CPT | Performed by: STUDENT IN AN ORGANIZED HEALTH CARE EDUCATION/TRAINING PROGRAM

## 2024-03-08 PROCEDURE — 3079F DIAST BP 80-89 MM HG: CPT | Performed by: STUDENT IN AN ORGANIZED HEALTH CARE EDUCATION/TRAINING PROGRAM

## 2024-03-08 PROCEDURE — 1126F AMNT PAIN NOTED NONE PRSNT: CPT | Performed by: STUDENT IN AN ORGANIZED HEALTH CARE EDUCATION/TRAINING PROGRAM

## 2024-03-08 PROCEDURE — 1160F RVW MEDS BY RX/DR IN RCRD: CPT | Performed by: STUDENT IN AN ORGANIZED HEALTH CARE EDUCATION/TRAINING PROGRAM

## 2024-03-08 PROCEDURE — 1036F TOBACCO NON-USER: CPT | Performed by: STUDENT IN AN ORGANIZED HEALTH CARE EDUCATION/TRAINING PROGRAM

## 2024-03-08 PROCEDURE — 99214 OFFICE O/P EST MOD 30 MIN: CPT | Performed by: STUDENT IN AN ORGANIZED HEALTH CARE EDUCATION/TRAINING PROGRAM

## 2024-03-08 ASSESSMENT — ENCOUNTER SYMPTOMS
PSYCHIATRIC NEGATIVE: 1
CONSTITUTIONAL NEGATIVE: 1
NEUROLOGICAL NEGATIVE: 1
RESPIRATORY NEGATIVE: 1
CARDIOVASCULAR NEGATIVE: 1

## 2024-03-08 NOTE — LETTER
2024     Ben Bob MD  [address]  [address]  [facility] [address]    Patient: Hank Rick   YOB: 1945   Date of Visit: 3/8/2024       Dear Dr. Ben Bob MD:    Thank you for referring Hank Rick to me for evaluation. Below are my notes for this consultation.  If you have questions, please do not hesitate to call me. I look forward to following your patient along with you.       Sincerely,     Stan Lopez MD      CC: No Recipients  ______________________________________________________________________________________     Patient: Hank Rick    [id number]  : 1945 -- AGE 78 y.o.    Provider: Stan Lopez MD     Location [facility]   Service Date: 3/8/2024              [facility]  Followup Visit Note    HISTORY OF PRESENT ILLNESS     HISTORY OF PRESENT ILLNESS   Hank Rick is a 78 y.o. male with h/o WALKER who presents to a [facility] for followup.     Assessment and plan from last visit: 2023    78 yo male here for f/u visit     # WALKER  - he had a HSAT that showed mod WALKER with 4% AHI around 25 and 3% AHI around 38.  - started on CPAP, doing well, but found it hard to tolerate sometimes. He does find benefit with treatment, but adjusting the mask and leak of the mask does wake him and wife at night  - he is interested in INSPIRE therapy  - continue PAP therapy for now with the same setting  - making referral to ENT Dr. Bob     RTC 6 wk after implant    Current History    On today's visit, the patient reports that he is doing well post-op.  There was some swelling but it has gone down quite a bit.  He is excited about the activation of therapy today.  Otherwise doing ok.    Daytime Symptoms    Patient reports DAYTIME SYMPTOMS: no daytime symptoms  Patient denies daytime symptoms including: Denies: feeling sleepy when driving    Naps: Yes, for 7times per week for 60min. Naps are  refreshing  Fatigue: denies feeling fatigue      REVIEW OF SYSTEMS     REVIEW OF SYSTEMS  Review of Systems   Constitutional: Negative.    HENT: Negative.     Respiratory: Negative.     Cardiovascular: Negative.    Genitourinary: Negative.    Skin: Negative.    Neurological: Negative.    Psychiatric/Behavioral: Negative.           ALLERGIES AND MEDICATIONS     ALLERGIES  Allergies   Allergen Reactions   • Pollen Extracts Other     Runny nose   • Gabapentin Anxiety       MEDICATIONS: He has a current medication list which includes the following prescription(s): acetaminophen - Take 2 tablets (1,300 mg) by mouth once daily. Do not crush, chew, or split, albuterol - Inhale 2 puffs. Every 4 to 6 hours as needed as directed, amlodipine - Take  2 tablets every day , please note change in dose, aspirin - Take 1 tablet (81 mg) by mouth once daily. Please re start on 01/17/2024, azelastine - Administer 2 sprays into each nostril 2 times a day, diclofenac sodium - Apply topically 2 times a day as needed, fexofenadine - Take 1 tablet (180 mg) by mouth once daily, fluticasone - Administer 1-2 sprays into each nostril once daily, meloxicam - take 1 tablet by mouth once daily if needed with food for MODERATE PAIN ( PAIN SCALE 4-6 ), memantine - Take 1 tablet (5 mg) by mouth 2 times a day, montelukast - Take 1 tablet (10 mg) by mouth once daily, omeprazole otc - Take 1 tablet (20 mg) by mouth once daily, propranolol - Take 1 tablet (10 mg) by mouth 3 times a day, propranolol la - Take 1 capsule (60 mg) by mouth once daily, sildenafil - Take 0.5-1 tablets ( mg) by mouth if needed for erectile dysfunction (1 hour prior to sexual activity (max 1 per day)), topiramate - Take 1-2 tablets (25-50 mg) by mouth as needed at bedtime, trazodone - Take 1 tablet (50 mg) by mouth once daily at bedtime, atorvastatin - Take 1 tablet (40 mg) by mouth once daily, and lidocaine - APPLY 1 PATCH TOPICALLY TO AFFECTED AREA ONCE DAILY. LEAVE IN  PLACE FOR 12 HOURS, THEN REMOVE AND KEEP OFF FOR 12 HOURS (Patient not taking: Reported on 2/7/2024).    PAST MEDICAL HISTORY : He  has a past medical history of Arthritis, Asthma, Cataract, Cerebral vascular accident (CMS/HCC) (05/09/2023), COVID-19 (11/15/2022), Esophageal obstruction (05/15/2022), Essential tremor (12/29/2022), GERD (gastroesophageal reflux disease), Hard of hearing, Hearing aid worn, Hyperlipidemia, Hypertension, Memory impairment, Olecranon bursitis, left elbow (04/26/2016), Personal history of diseases of the blood and blood-forming organs and certain disorders involving the immune mechanism (05/17/2022), Personal history of other diseases of the digestive system (11/13/2014), Personal history of other diseases of the digestive system (01/14/2021), Personal history of other specified conditions (11/29/2018), Rash and other nonspecific skin eruption (11/02/2021), Salmonella enteritis, Sleep apnea, TIA (transient ischemic attack), and Vertebral artery stenosis.    PAST SURGICAL HISTORY: He  has a past surgical history that includes Other surgical history (01/07/2015); Tonsillectomy (11/18/2014); Refractive surgery (03/05/2014); Other surgical history (03/05/2014); MR angio head wo IV contrast (05/09/2023); MR angio neck wo IV contrast (05/09/2023); CT angio head w and wo IV contrast (05/12/2023); CT angio neck (05/12/2023); Other surgical history; Back surgery; Cataract extraction; Total shoulder arthroplasty (Right); and Hernia repair.     FAMILY HISTORY: No changes since previous visit. Otherwise non-contributory as charted.     SOCIAL HISTORY  He  reports that he has never smoked. He has never used smokeless tobacco. He reports current alcohol use of about 4.0 standard drinks of alcohol per week. He reports that he does not currently use drugs after having used the following drugs: Marijuana.       PHYSICAL EXAM     VITAL SIGNS: /81 (BP Location: Right arm, Patient Position: Sitting, BP  Cuff Size: Adult)   Pulse 77   Temp 36.7 °C (98 °F) (Temporal)   Wt 86.3 kg (190 lb 3.2 oz)   BMI 25.09 kg/m²      PREVIOUS WEIGHTS:  Wt Readings from Last 3 Encounters:   03/08/24 86.3 kg (190 lb 3.2 oz)   02/07/24 85.5 kg (188 lb 6.4 oz)   01/29/24 85.3 kg (188 lb)         RESULTS/DATA     Bicarbonate (mmol/L)   Date Value   01/14/2024 26   01/13/2024 25   01/08/2024 26     Iron (ug/dL)   Date Value   05/19/2022 140   06/16/2020 102     Iron Saturation (%)   Date Value   05/19/2022 54 (H)   06/16/2020 41     TIBC (ug/dL)   Date Value   05/19/2022 257   06/16/2020 251     Ferritin (ug/L)   Date Value   08/21/2023 1,314 (H)   05/19/2022 1,073 (H)   08/17/2020 616 (H)       PAP Adherence  Not applicable    ASSESSMENT/PLAN     Mr. Rick is a 78 y.o. male and he returns in followup to the [facility] for WALKER.    Problem List, Orders, Assessment, Recommendations:  Problem List Items Addressed This Visit             ICD-10-CM    Chronic insomnia F51.04     Maintenance issue mostly  INSPIRE treatment for WALKER should hopefully solve the problem         WALKER (obstructive sleep apnea) - Primary G47.33     Patient was implanted with HGNS therapy for WALKER on 1/8/2024 by Dr. Bob.  Incision healing well, tongue motion normal  Patient was here for activation today.     ST: 0.7v  FT: 1.0v  Outgoing setting 0.8-1.8 (@1.0v)  Start Delay: 30 min, Pause: 15 min, Therapy length: 9 hr    Patient tolerated the activation process well  Use of remote was educated and INSPIRE therapy remote was paired via bluetooth with patient's phone.            Disposition    Return to clinic in 2 months

## 2024-03-08 NOTE — PROGRESS NOTES
Patient: Hank Rick    [id number]  : 1945 -- AGE 78 y.o.    Provider: Stan Lopez MD     Location [facility]   Service Date: 3/8/2024              [facility]  Followup Visit Note    HISTORY OF PRESENT ILLNESS     HISTORY OF PRESENT ILLNESS   Hank Rick is a 78 y.o. male with h/o WALKER who presents to a [facility] for followup.     Assessment and plan from last visit: 2023    76 yo male here for f/u visit     # WALKER  - he had a HSAT that showed mod WALKER with 4% AHI around 25 and 3% AHI around 38.  - started on CPAP, doing well, but found it hard to tolerate sometimes. He does find benefit with treatment, but adjusting the mask and leak of the mask does wake him and wife at night  - he is interested in INSPIRE therapy  - continue PAP therapy for now with the same setting  - making referral to ENT Dr. Paulino KRISHNAN 6 wk after implant    Current History    On today's visit, the patient reports that he is doing well post-op.  There was some swelling but it has gone down quite a bit.  He is excited about the activation of therapy today.  Otherwise doing ok.    Daytime Symptoms    Patient reports DAYTIME SYMPTOMS: no daytime symptoms  Patient denies daytime symptoms including: Denies: feeling sleepy when driving    Naps: Yes, for 7times per week for 60min. Naps are refreshing  Fatigue: denies feeling fatigue      REVIEW OF SYSTEMS     REVIEW OF SYSTEMS  Review of Systems   Constitutional: Negative.    HENT: Negative.     Respiratory: Negative.     Cardiovascular: Negative.    Genitourinary: Negative.    Skin: Negative.    Neurological: Negative.    Psychiatric/Behavioral: Negative.           ALLERGIES AND MEDICATIONS     ALLERGIES  Allergies   Allergen Reactions    Pollen Extracts Other     Runny nose    Gabapentin Anxiety       MEDICATIONS: He has a current medication list which includes the following prescription(s): acetaminophen  - Take 2 tablets (1,300 mg) by mouth once daily. Do not crush, chew, or split, albuterol - Inhale 2 puffs. Every 4 to 6 hours as needed as directed, amlodipine - Take  2 tablets every day , please note change in dose, aspirin - Take 1 tablet (81 mg) by mouth once daily. Please re start on 01/17/2024, azelastine - Administer 2 sprays into each nostril 2 times a day, diclofenac sodium - Apply topically 2 times a day as needed, fexofenadine - Take 1 tablet (180 mg) by mouth once daily, fluticasone - Administer 1-2 sprays into each nostril once daily, meloxicam - take 1 tablet by mouth once daily if needed with food for MODERATE PAIN ( PAIN SCALE 4-6 ), memantine - Take 1 tablet (5 mg) by mouth 2 times a day, montelukast - Take 1 tablet (10 mg) by mouth once daily, omeprazole otc - Take 1 tablet (20 mg) by mouth once daily, propranolol - Take 1 tablet (10 mg) by mouth 3 times a day, propranolol la - Take 1 capsule (60 mg) by mouth once daily, sildenafil - Take 0.5-1 tablets ( mg) by mouth if needed for erectile dysfunction (1 hour prior to sexual activity (max 1 per day)), topiramate - Take 1-2 tablets (25-50 mg) by mouth as needed at bedtime, trazodone - Take 1 tablet (50 mg) by mouth once daily at bedtime, atorvastatin - Take 1 tablet (40 mg) by mouth once daily, and lidocaine - APPLY 1 PATCH TOPICALLY TO AFFECTED AREA ONCE DAILY. LEAVE IN PLACE FOR 12 HOURS, THEN REMOVE AND KEEP OFF FOR 12 HOURS (Patient not taking: Reported on 2/7/2024).    PAST MEDICAL HISTORY : He  has a past medical history of Arthritis, Asthma, Cataract, Cerebral vascular accident (CMS/[facility]) (05/09/2023), COVID-19 (11/15/2022), Esophageal obstruction (05/15/2022), Essential tremor (12/29/2022), GERD (gastroesophageal reflux disease), Hard of hearing, Hearing aid worn, Hyperlipidemia, Hypertension, Memory impairment, Olecranon bursitis, left elbow (04/26/2016), Personal history of diseases of the blood and blood-forming organs and certain  disorders involving the immune mechanism (05/17/2022), Personal history of other diseases of the digestive system (11/13/2014), Personal history of other diseases of the digestive system (01/14/2021), Personal history of other specified conditions (11/29/2018), Rash and other nonspecific skin eruption (11/02/2021), Salmonella enteritis, Sleep apnea, TIA (transient ischemic attack), and Vertebral artery stenosis.    PAST SURGICAL HISTORY: He  has a past surgical history that includes Other surgical history (01/07/2015); Tonsillectomy (11/18/2014); Refractive surgery (03/05/2014); Other surgical history (03/05/2014); MR angio head wo IV contrast (05/09/2023); MR angio neck wo IV contrast (05/09/2023); CT angio head w and wo IV contrast (05/12/2023); CT angio neck (05/12/2023); Other surgical history; Back surgery; Cataract extraction; Total shoulder arthroplasty (Right); and Hernia repair.     FAMILY HISTORY: No changes since previous visit. Otherwise non-contributory as charted.     SOCIAL HISTORY  He  reports that he has never smoked. He has never used smokeless tobacco. He reports current alcohol use of about 4.0 standard drinks of alcohol per week. He reports that he does not currently use drugs after having used the following drugs: Marijuana.       PHYSICAL EXAM     VITAL SIGNS: /81 (BP Location: Right arm, Patient Position: Sitting, BP Cuff Size: Adult)   Pulse 77   Temp 36.7 °C (98 °F) (Temporal)   Wt 86.3 kg (190 lb 3.2 oz)   BMI 25.09 kg/m²      PREVIOUS WEIGHTS:  Wt Readings from Last 3 Encounters:   03/08/24 86.3 kg (190 lb 3.2 oz)   02/07/24 85.5 kg (188 lb 6.4 oz)   01/29/24 85.3 kg (188 lb)         RESULTS/DATA     Bicarbonate (mmol/L)   Date Value   01/14/2024 26   01/13/2024 25   01/08/2024 26     Iron (ug/dL)   Date Value   05/19/2022 140   06/16/2020 102     Iron Saturation (%)   Date Value   05/19/2022 54 (H)   06/16/2020 41     TIBC (ug/dL)   Date Value   05/19/2022 257   06/16/2020 251      Ferritin (ug/L)   Date Value   08/21/2023 1,314 (H)   05/19/2022 1,073 (H)   08/17/2020 616 (H)       PAP Adherence  Not applicable    ASSESSMENT/PLAN     Mr. Rick is a 78 y.o. male and he returns in followup to the [facility] for WALKER.    Problem List, Orders, Assessment, Recommendations:  Problem List Items Addressed This Visit             ICD-10-CM    Chronic insomnia F51.04     Maintenance issue mostly  INSPIRE treatment for WALKER should hopefully solve the problem         WALKER (obstructive sleep apnea) - Primary G47.33     Patient was implanted with HGNS therapy for WALKER on 1/8/2024 by Dr. Bob.  Incision healing well, tongue motion normal  Patient was here for activation today.     ST: 0.7v  FT: 1.0v  Outgoing setting 0.8-1.8 (@1.0v)  Start Delay: 30 min, Pause: 15 min, Therapy length: 9 hr    Patient tolerated the activation process well  Use of remote was educated and INSPIRE therapy remote was paired via bluetooth with patient's phone.            Disposition    Return to clinic in 2 months

## 2024-03-08 NOTE — ASSESSMENT & PLAN NOTE
Patient was implanted with HGNS therapy for WALKER on 1/8/2024 by Dr. Bob.  Incision healing well, tongue motion normal  Patient was here for activation today.     ST: 0.7v  FT: 1.0v  Outgoing setting 0.8-1.8 (@1.0v)  Start Delay: 30 min, Pause: 15 min, Therapy length: 9 hr    Patient tolerated the activation process well  Use of remote was educated and INSPIRE therapy remote was paired via bluetooth with patient's phone.

## 2024-03-12 DIAGNOSIS — M48.062 LUMBAR STENOSIS WITH NEUROGENIC CLAUDICATION: Primary | ICD-10-CM

## 2024-03-12 RX ORDER — TOPIRAMATE 25 MG/1
TABLET ORAL
Qty: 60 TABLET | Refills: 11 | Status: SHIPPED | OUTPATIENT
Start: 2024-03-12 | End: 2025-03-12

## 2024-03-14 ENCOUNTER — OFFICE VISIT (OUTPATIENT)
Dept: NEUROLOGY | Facility: CLINIC | Age: 79
End: 2024-03-14
Payer: MEDICARE

## 2024-03-14 DIAGNOSIS — G20.C PARKINSONISM, UNSPECIFIED PARKINSONISM TYPE (MULTI): Primary | ICD-10-CM

## 2024-03-14 PROCEDURE — 1159F MED LIST DOCD IN RCRD: CPT | Performed by: PSYCHIATRY & NEUROLOGY

## 2024-03-14 PROCEDURE — 99215 OFFICE O/P EST HI 40 MIN: CPT | Performed by: PSYCHIATRY & NEUROLOGY

## 2024-03-14 PROCEDURE — 1160F RVW MEDS BY RX/DR IN RCRD: CPT | Performed by: PSYCHIATRY & NEUROLOGY

## 2024-03-14 PROCEDURE — 1036F TOBACCO NON-USER: CPT | Performed by: PSYCHIATRY & NEUROLOGY

## 2024-03-14 ASSESSMENT — ENCOUNTER SYMPTOMS
DEPRESSION: 0
OCCASIONAL FEELINGS OF UNSTEADINESS: 0
LOSS OF SENSATION IN FEET: 0

## 2024-03-14 NOTE — PROGRESS NOTES
[place], [place]      Department of Neurology  [facility]    F/U Consultation    PCP: Dr. Sonu Larose       2024  Re: Hank Rick  : 1945           MRN [id number]    CC: 79 yo man in eval of memory loss.  Info from pt, wife (ISAMAR Smith), and EMR.  HPI: : Covid 2022 then memory diffic uyen names.  ET worse in Rt hand, better now. :  No losing things, or way-finding diffic, but feels “in a fog,” w/ dizzy “unsteadiness” uyen in shower since Covid; no falls, more dozing.  :  More “upbeat,” less memory diffic.  Wife sees less “out-of-it” off NTN.  In driving he is less focused, no accidents.  : Recovers from Rt shoulder surgery, memory better, wife not convinced but agree word-finding better.  2023:  April L4-5-6 surg spacer insertx2 but pain recurs after lifting.  Last Tu disoriented driving home, got lost.  At home wife asked, pt respd, “I don't think I have any…then staring and slurring.”  Dxd TIA, “no damage,” d/cd 2d later on HTN Rx.  Next day dizzy in shower, wife found unconscious on floor, called [facility], d/cd home on antiHTN.  Pt lifted too much, exac LBP, shuffling gait (can't lift feet).  Driving too slow/fast, unsteady shuffling.  Rxd ASA after 2nd Chandrika stroke alert, doing well at home in the past week.  :  Fell in bedrm hosp dx Covid. w/o head strike, that week he was “talking gibberish,” legs heavy.  : A bit light-headed w/ initial BC=067/70, then 120/70, notices a little bit, an occasional event, had work-out this AM.  He tells he that being mildly unsteady and lightheaded is usual for him.  Med Hx: NKAD; intol NTN (weird dreams)  Rx:   [id number],     [id number],        [id number],    [id number],   CPAP      [id number], [id number], [id number], diclofenac gel,      flonase pn, astelin pn,         [id number],        [id number]   H/O: Depress, EssTrm, WALKER, EsophStrict,  GERD, BPH, salmo enteritis, h hernia, rash  S/P:  LumbarSpacer (2023), corneal Lasik,  RotCuff,   tonsillx,  EGFD  FHx: Dad colon CA; Mom MS, killed by truck,  2 bro, 1 estranged,  2 kids (1 step, 2 gkids)  PSHx: [employer], marriedx3, [id number]; retired, MICHA, OOBtoBRx2, still drives  ROS: Skin-Skel Rt Lstenosis Cardio-pulm seasonal wheeze,GI Barretts   Neuro:  RtRadic   Exam: MS=321/70à120/70, HR=58, RR=16, zh=762 lb   Lungs w/ clear, RRR w/o MRG, Nl carotids, Abdo+BS;  +2 ankle edema   Neuro: MS: calm/engaging  not anxious or sad   CN:  FEOM H&V w/o ptosis, nl saccs/ptosis,  intact pursuit, face symm    Motor: strong & steady  mild nl tone   w/o UE drift  or invol mvmts      MSRs: +1-2BJs   traceKJs   w/ subtle UE spread   Sens:  decreased cold & vib bilat distal LEs   -Romberg, symm sway <1cm, misbalance      Coordin: FT fast w/ reg pace bilat       concentric [place] drawn w/ mild hi freq irreg   Gait: nl pace, 1 step turn, unstable tandem, little symm armswing   Cognitive: RH  Language: wnl comprehension, spont stmts wnl,   3-5 words phrases, some full sentences    w/o paraphasias   Prev:  MoCA = 27/30 c/w not impaired  GDS: 0/15 c/w no mood disorder  Labs (to 13-Aug-2023):  wbc=8.7, hct=36.9, qdd=145   Chem: wnl zri=051, bun/crt=17/1.09, GFR=69 AST=15  ALT=19,  TSH=1.36     Mtsz=665, HDL=39.7, Chol/hdl=3.8 LDL=83 vldl=29, sn=928  MMA=90 Cthzcg74=OI   ypwy=606,  INR=1.0   EKG (23-Aug-2023): sinus myriam=48, XSlE=876  Cspine MRI (8-): 1. 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 Hyperintens C5-C7, ? injury/venous/bursitis       MMA=90 Bvlmxs05=MC   almv=581,  INR=1.0   Neuropsych Eval (14-Sep-2022): …isolated mild weakness of attn/exec func.  Other exec func and memory, lang, vis spat, vis construc and mood well preserved.  Brain MRI (27-Jun-2022): 1. No infarct. 2) mod brain vol loss. 3. Scattered, patchy, & confluent WM changes bilat and brainstem, likely sml vsl changes. 4. Punctate T2 bright in subinsular regions, BGs, int  caps, thalami c/w prom perivasc spaces or small scattered remote lacunes.  Brain MRI/A (9-May-2023) MRI: No acute infarct/mass/shift.  GRE hypointense signal Lt>Rt GP extends into bilat med cerebellar peduncles w/ mild Lt encephalomalacia (degen vs old hem). Mod genl parenchy vol loss & WM change.  MRA: Redemo small Lt vert tapers intracranially.   Brain MRI/A (23-Aug-2023): 1. No acute abnl or mass.  2. Stable chronic findings including prob chronic sml vsl changes in white matter infarcts, overall marked in extent. (CJD: incr sml vsl dis.)  Sleep Study (12-May-2023): WALKER on CPAP not well-tolerated, considering Inspire  A & P:  Post-Covid memory diffic, uyen names.  S/P lumbar vert spacer cxd by over-exertion and worse then two episodes of acute neuro change dxd TIAs.  Genl exam unremarkable.  Neuro exam w/ gait instab and variable symm hand tremor.  Cognitive exam w/ intact language despite TIA related dysphasias.  Prev neuropsych w/ exec dysfunc.  Labs w/ wbc=3.9à8.8 mildly irregular sleep.  MRI (June2022) w/ genl gyral atrophy, bilat ant periventric WM changes.  MRI (May2023) old bilat GPàpeduncles; MRA dimunitive Lt vert tapering intracranially.      Covid x2 “hit,” ?antiHTN drop BP? in car & hot shower.  Told no new lesions.  Chronic LBP dominated the recurrent stroke alerts w/ MR evid of bilat GPàmidPeduncle old lesions, tight Lt vert raises sustained concern Re a cerebrovasculare etiology.  Otherwise DDx is PD?ET and I will request a MAURICIO scan to address that.  His chr depress might be exacerbated, but he tells me his memory/cogn is doing well, and I share that impression.  Regardless how we got her, we are stopping the inderaltid, staying w/ prev sinle dose LA.  I will wait for MAURICIO scan but I remain concerned that this is not ET, at least not exclusively.  I will f/u in 3 months.   Thank you for allowing me to participate in the care of your patient.   Sincerely yours, Darek Sánchez M.D., Ph.D., Professor  of Neurology

## 2024-03-21 ENCOUNTER — OFFICE VISIT (OUTPATIENT)
Dept: PRIMARY CARE | Facility: CLINIC | Age: 79
End: 2024-03-21
Payer: MEDICARE

## 2024-03-21 VITALS
BODY MASS INDEX: 25.58 KG/M2 | OXYGEN SATURATION: 95 % | WEIGHT: 193 LBS | HEIGHT: 73 IN | DIASTOLIC BLOOD PRESSURE: 72 MMHG | HEART RATE: 62 BPM | SYSTOLIC BLOOD PRESSURE: 126 MMHG

## 2024-03-21 DIAGNOSIS — I65.02 VERTEBRAL ARTERY STENOSIS/OCCLUSION, LEFT: ICD-10-CM

## 2024-03-21 DIAGNOSIS — F01.50 MIXED ALZHEIMER'S AND VASCULAR DEMENTIA (MULTI): ICD-10-CM

## 2024-03-21 DIAGNOSIS — R41.9 COGNITIVE COMPLAINTS WITH NORMAL NEUROPSYCHOLOGICAL EXAM: ICD-10-CM

## 2024-03-21 DIAGNOSIS — G89.4 CHRONIC PAIN SYNDROME: ICD-10-CM

## 2024-03-21 DIAGNOSIS — I10 PRIMARY HYPERTENSION: ICD-10-CM

## 2024-03-21 DIAGNOSIS — M25.561 CHRONIC PAIN OF BOTH KNEES: ICD-10-CM

## 2024-03-21 DIAGNOSIS — F02.80 MIXED ALZHEIMER'S AND VASCULAR DEMENTIA (MULTI): ICD-10-CM

## 2024-03-21 DIAGNOSIS — G45.0 VERTEBROBASILAR ARTERY INSUFFICIENCY: ICD-10-CM

## 2024-03-21 DIAGNOSIS — Z00.00 MEDICARE ANNUAL WELLNESS VISIT, SUBSEQUENT: Primary | Chronic | ICD-10-CM

## 2024-03-21 DIAGNOSIS — D69.2 OTHER NONTHROMBOCYTOPENIC PURPURA (CMS-HCC): ICD-10-CM

## 2024-03-21 DIAGNOSIS — G30.9 MIXED ALZHEIMER'S AND VASCULAR DEMENTIA (MULTI): ICD-10-CM

## 2024-03-21 DIAGNOSIS — G89.29 CHRONIC PAIN OF BOTH KNEES: ICD-10-CM

## 2024-03-21 DIAGNOSIS — M48.062 LUMBAR STENOSIS WITH NEUROGENIC CLAUDICATION: ICD-10-CM

## 2024-03-21 DIAGNOSIS — M25.562 CHRONIC PAIN OF BOTH KNEES: ICD-10-CM

## 2024-03-21 PROCEDURE — 3074F SYST BP LT 130 MM HG: CPT | Performed by: INTERNAL MEDICINE

## 2024-03-21 PROCEDURE — G0439 PPPS, SUBSEQ VISIT: HCPCS | Performed by: INTERNAL MEDICINE

## 2024-03-21 PROCEDURE — 1160F RVW MEDS BY RX/DR IN RCRD: CPT | Performed by: INTERNAL MEDICINE

## 2024-03-21 PROCEDURE — 1170F FXNL STATUS ASSESSED: CPT | Performed by: INTERNAL MEDICINE

## 2024-03-21 PROCEDURE — 1036F TOBACCO NON-USER: CPT | Performed by: INTERNAL MEDICINE

## 2024-03-21 PROCEDURE — 1159F MED LIST DOCD IN RCRD: CPT | Performed by: INTERNAL MEDICINE

## 2024-03-21 PROCEDURE — 3078F DIAST BP <80 MM HG: CPT | Performed by: INTERNAL MEDICINE

## 2024-03-21 RX ORDER — OXYCODONE AND ACETAMINOPHEN 5; 325 MG/1; MG/1
1 TABLET ORAL EVERY 6 HOURS PRN
Qty: 20 TABLET | Refills: 0 | Status: SHIPPED | OUTPATIENT
Start: 2024-03-21 | End: 2024-03-26

## 2024-03-21 ASSESSMENT — ENCOUNTER SYMPTOMS
CONSTITUTIONAL NEGATIVE: 1
BACK PAIN: 1

## 2024-03-21 ASSESSMENT — PATIENT HEALTH QUESTIONNAIRE - PHQ9
1. LITTLE INTEREST OR PLEASURE IN DOING THINGS: NOT AT ALL
SUM OF ALL RESPONSES TO PHQ9 QUESTIONS 1 AND 2: 0
2. FEELING DOWN, DEPRESSED OR HOPELESS: NOT AT ALL

## 2024-03-21 ASSESSMENT — ACTIVITIES OF DAILY LIVING (ADL)
BATHING: INDEPENDENT
DRESSING: INDEPENDENT
GROCERY_SHOPPING: INDEPENDENT
DOING_HOUSEWORK: INDEPENDENT
MANAGING_FINANCES: INDEPENDENT
TAKING_MEDICATION: INDEPENDENT

## 2024-03-21 NOTE — PROGRESS NOTES
"Patient ID: Hank Rick is a 78 y.o. male who presents for Medicare Annual Wellness Visit Subsequent and Back Pain.    /72   Pulse 62   Ht 1.848 m (6' 0.75\")   Wt 87.5 kg (193 lb)   SpO2 95%   BMI 25.64 kg/m²     Back Pain            Hypertension on medications controlled  No chest pain, no shortness of breath, no PND, no orthopnea,  No leg swelling, no palpitation, no headache, no syncope,        Mixed Alzheimer's and vascular dementia  Stable      Nonthrombocytopenic purpura  Resolved      Vertebrobasilar B basilar artery insufficiency  Stable      Lumbar canal stenosis with neurogenic claudication see      Subjective     Review of Systems   Constitutional: Negative.    Musculoskeletal:  Positive for back pain.   All other systems reviewed and are negative.      Objective     Physical Exam  Vitals and nursing note reviewed.   Cardiovascular:      Rate and Rhythm: Normal rate and regular rhythm.      Pulses: Normal pulses.      Heart sounds: Normal heart sounds. No murmur heard.  Pulmonary:      Effort: Pulmonary effort is normal.      Breath sounds: Normal breath sounds.   Musculoskeletal:      Right lower leg: No edema.      Left lower leg: No edema.   Skin:     Capillary Refill: Capillary refill takes more than 3 seconds.   Neurological:      General: No focal deficit present.      Mental Status: He is oriented to person, place, and time. Mental status is at baseline.   Psychiatric:         Mood and Affect: Mood normal.         Behavior: Behavior normal.         Thought Content: Thought content normal.         Judgment: Judgment normal.         Lab Results   Component Value Date    WBC 8.4 01/14/2024    HGB 11.7 (L) 01/14/2024    HCT 35.4 (L) 01/14/2024    MCV 96 01/14/2024     01/14/2024           Problem List Items Addressed This Visit       Vertebrobasilar artery insufficiency    Vertebral artery stenosis/occlusion, left    Primary hypertension    Cognitive complaints with normal " neuropsychological exam    Lumbar stenosis with neurogenic claudication    Mixed Alzheimer's and vascular dementia (CMS/HCC)     STABLE         Other nonthrombocytopenic purpura (CMS/[facility])     STABLE          Other Visit Diagnoses       Medicare annual wellness visit, subsequent  (Chronic)   -  Primary                A/P         OARRS REVIEWED , NO RED FLAG   Endocet 5/325mg 1 pill every 6 hrly for 5 days qnty 20 , no refill   Follow up if needed

## 2024-03-22 ENCOUNTER — OFFICE VISIT (OUTPATIENT)
Dept: OTOLARYNGOLOGY | Facility: CLINIC | Age: 79
End: 2024-03-22
Payer: MEDICARE

## 2024-03-22 VITALS — HEIGHT: 72 IN | WEIGHT: 191 LBS | BODY MASS INDEX: 25.87 KG/M2

## 2024-03-22 DIAGNOSIS — G47.33 OBSTRUCTIVE SLEEP APNEA: Primary | ICD-10-CM

## 2024-03-22 PROCEDURE — 1159F MED LIST DOCD IN RCRD: CPT | Performed by: OTOLARYNGOLOGY

## 2024-03-22 PROCEDURE — 99024 POSTOP FOLLOW-UP VISIT: CPT | Performed by: OTOLARYNGOLOGY

## 2024-03-22 PROCEDURE — 1160F RVW MEDS BY RX/DR IN RCRD: CPT | Performed by: OTOLARYNGOLOGY

## 2024-03-22 PROCEDURE — 1036F TOBACCO NON-USER: CPT | Performed by: OTOLARYNGOLOGY

## 2024-03-22 NOTE — PROGRESS NOTES
Chief Complaint   Patient presents with    Follow-up     LOV: 1/2024 INSPIRE F/U      Date of Evaluation: 3/22/2024   HPI  He returns in follow-up for obstructive sleep apnea.  Inspire has been activated by Dr. Lopez and he is doing very well.  He is sleeping much better.  Little to no snoring but he is still going through titration.  His chest wall hematoma has resolved  Hank Rick is a 78 y.o. male 10 days status post inspire implantation.  A few days ago he started with a cough.  He then developed a hematoma around the chest.  He went to [facility] and was admitted overnight.  CT scan was not impressive for any significant collection.  He feels like it has gotten somewhat larger over the past 24 hours.  No associated pain.  He does have a history of bleeding.       Past Medical History:   Diagnosis Date    Arthritis     Asthma     Cataract     Cerebral vascular accident (CMS/HCC) 05/09/2023    COVID-19 11/15/2022    COVID-19 virus infection    Esophageal obstruction 05/15/2022    Esophageal stricture    Essential tremor 12/29/2022    Essential tremor    GERD (gastroesophageal reflux disease)     Hard of hearing     Hearing aid worn     Hyperlipidemia     Hypertension     Memory impairment     Olecranon bursitis, left elbow 04/26/2016    Olecranon bursitis of left elbow    Personal history of diseases of the blood and blood-forming organs and certain disorders involving the immune mechanism 05/17/2022    History of anemia    Personal history of other diseases of the digestive system 11/13/2014    History of gastroesophageal reflux (GERD)    Personal history of other diseases of the digestive system 01/14/2021    History of hiatal hernia    Personal history of other specified conditions 11/29/2018    History of urinary frequency    Rash and other nonspecific skin eruption 11/02/2021    Skin rash    Salmonella enteritis     Salmonella enteritis    Sleep apnea     TIA (transient ischemic attack)     Vertebral  artery stenosis       Past Surgical History:   Procedure Laterality Date    BACK SURGERY      CATARACT EXTRACTION      CT ANGIO NECK  05/12/2023    CT NECK ANGIO W AND WO IV CONTRAST 5/12/2023 U CT    CT HEAD ANGIO W AND WO IV CONTRAST  05/12/2023    CT HEAD ANGIO W AND WO IV CONTRAST 5/12/2023 U CT    HERNIA REPAIR      MR HEAD ANGIO WO IV CONTRAST  05/09/2023    MR HEAD ANGIO WO IV CONTRAST 5/9/2023 [facility] MRI    MR NECK ANGIO WO IV CONTRAST  05/09/2023    MR NECK ANGIO WO IV CONTRAST 5/9/2023 [facility] MRI    OTHER SURGICAL HISTORY  01/07/2015    Esophagogastric Fundoplasty Laparoscopic For Paraesophageal Hernia Repair    OTHER SURGICAL HISTORY  03/05/2014    Shoulder Surgery Left    OTHER SURGICAL HISTORY      REFRACTIVE SURGERY  03/05/2014    Corneal LASIK    TONSILLECTOMY  11/18/2014    Tonsillectomy    TOTAL SHOULDER ARTHROPLASTY Right     reverse          Medications:   Current Outpatient Medications   Medication Instructions    acetaminophen (TYLENOL 8 HOUR) 1,300 mg, oral, Daily, Do not crush, chew, or split.    albuterol 90 mcg/actuation inhaler 2 puffs, inhalation, Every 4 to 6 hours as needed as directed    amLODIPine (Norvasc) 2.5 mg tablet Take  2 tablets every day , please note change in dose    aspirin 81 mg, oral, Daily, Please re start on 01/17/2024    atorvastatin (LIPITOR) 40 mg, oral, Daily    azelastine (Astelin) 137 mcg (0.1 %) nasal spray 2 sprays, Each Nostril, 2 times daily    diclofenac sodium (Voltaren) 1 % gel gel Topical, 2 times daily PRN    fexofenadine (Allegra) 180 mg tablet 1 tablet, oral, Daily    fluticasone (Flonase) 50 mcg/actuation nasal spray 1-2 sprays, Each Nostril, Daily    lidocaine 4 % patch APPLY 1 PATCH TOPICALLY TO AFFECTED AREA ONCE DAILY. LEAVE IN PLACE FOR 12 HOURS, THEN REMOVE AND KEEP OFF FOR 12 HOURS.    meloxicam (Mobic) 15 mg tablet take 1 tablet by mouth once daily if needed with food for MODERATE PAIN ( PAIN SCALE 4-6 )    memantine (NAMENDA) 5 mg, oral, 2 times  daily    montelukast (SINGULAIR) 10 mg, oral, Daily    omeprazole OTC (PriLOSEC OTC) 20 mg EC tablet 1 tablet, oral, Daily    oxyCODONE-acetaminophen (Percocet) 5-325 mg tablet 1 tablet, oral, Every 6 hours PRN    propranolol LA (INDERAL LA) 60 mg, oral, Daily    sildenafil (VIAGRA)  mg, oral, As needed    topiramate (Topamax) 25 mg tablet TAKE 1-2 TABLETS AT BEDTIME    traZODone (DESYREL) 50 mg, oral, Nightly        Allergies:  Allergies   Allergen Reactions    Pollen Extracts Other     Runny nose    Gabapentin Anxiety        Physical Exam:  Last Recorded Vitals  Height 1.829 m (6'), weight 86.6 kg (191 lb).  []General appearance: Well-developed, well-nourished in no acute distress, conversant with normal voice quality    Head/face: No erythema or edema or facial tenderness, and normal facial nerve function bilaterally    External ear: Clear external auditory canals with normal pinnae  Tube status: N/A  Middle ear: Tympanic membranes intact and mobile, middle ears normal.  Tympanic membrane perforation: N/A  Mastoid bowl: N/A  Hearing: Normal conversational awareness at normal speech thresholds    Nose visualized using: Anterior rhinoscopy  Nasal dorsum: Nontraumatic midline appearance  Septum: Midline, nonobstructing  Inferior turbinates: Normal, pink  Secretions: Dry    Oral cavity and oropharynx: Normal  Teeth: Good condition  Floor of mouth: without lesions  Palate: Normal hard palate, soft palate and uvula  Oropharynx: Clear, no lesions present  Buccal mucosa: Normal without masses or lesions  Lips: Normal    Nasopharynx: Inadequate mirror exam secondary to gag/anatomy    Neck:  Salivary glands: Normal bilateral parotid and submandibular glands by inspection and palpation.  Non-thyroid masses: No palpable masses or significant lymphadenopathy  Trachea: Midline  Thyroid: No thyromegaly or palpable nodules  Temporomandibular joint: Nontender  Cervical range of motion: Normal    Neurologic exam: Alert and  oriented x3, appropriate affect.  Cranial nerves II-XII normal bilaterally  Extraocular movement: Extraocular movement intact, normal gaze alignment    Resolved chest wall hematoma.  Incisions well-healed    Hank was seen today for follow-up.  Diagnoses and all orders for this visit:  Obstructive sleep apnea (Primary)           PLAN  Continue working with Dr. Lopez through inspire titration.  Recheck with me in 6 months    Ben Bob MD7

## 2024-03-27 ENCOUNTER — APPOINTMENT (OUTPATIENT)
Dept: RADIOLOGY | Facility: HOSPITAL | Age: 79
End: 2024-03-27
Payer: MEDICARE

## 2024-03-28 ENCOUNTER — HOSPITAL ENCOUNTER (OUTPATIENT)
Dept: RADIOLOGY | Facility: HOSPITAL | Age: 79
Discharge: HOME | End: 2024-03-28
Payer: MEDICARE

## 2024-03-28 DIAGNOSIS — G20.C PARKINSONISM, UNSPECIFIED PARKINSONISM TYPE (MULTI): ICD-10-CM

## 2024-03-28 PROCEDURE — 2500000001 HC RX 250 WO HCPCS SELF ADMINISTERED DRUGS (ALT 637 FOR MEDICARE OP): Performed by: RADIOLOGY

## 2024-03-28 PROCEDURE — 78803 RP LOCLZJ TUM SPECT 1 AREA: CPT

## 2024-03-28 PROCEDURE — 78803 RP LOCLZJ TUM SPECT 1 AREA: CPT | Performed by: STUDENT IN AN ORGANIZED HEALTH CARE EDUCATION/TRAINING PROGRAM

## 2024-03-28 PROCEDURE — 3430000001 HC RX 343 DIAGNOSTIC RADIOPHARMACEUTICALS: Performed by: PSYCHIATRY & NEUROLOGY

## 2024-03-28 PROCEDURE — A9584 IODINE I-123 IOFLUPANE: HCPCS | Performed by: PSYCHIATRY & NEUROLOGY

## 2024-03-28 RX ORDER — IOFLUPANE I-123 2 MCI/ML
4.4 INJECTION, SOLUTION INTRAVENOUS ONCE
Status: COMPLETED | OUTPATIENT
Start: 2024-03-28 | End: 2024-03-28

## 2024-03-28 RX ADMIN — IOFLUPANE I-123 4.4 MILLICURIE: 2 INJECTION, SOLUTION INTRAVENOUS at 11:20

## 2024-03-28 RX ADMIN — IODINE SOLUTION STRONG 5% (LUGOL'S) 0.8 ML: 5 SOLUTION at 10:20

## 2024-03-31 DIAGNOSIS — R41.3 MEMORY LOSS: ICD-10-CM

## 2024-04-01 ENCOUNTER — APPOINTMENT (OUTPATIENT)
Dept: UROLOGY | Facility: CLINIC | Age: 79
End: 2024-04-01
Payer: MEDICARE

## 2024-04-01 RX ORDER — MEMANTINE HYDROCHLORIDE 5 MG/1
5 TABLET ORAL 2 TIMES DAILY
Qty: 180 TABLET | Refills: 3 | Status: SHIPPED | OUTPATIENT
Start: 2024-04-01

## 2024-04-03 ENCOUNTER — OFFICE VISIT (OUTPATIENT)
Dept: UROLOGY | Facility: CLINIC | Age: 79
End: 2024-04-03
Payer: MEDICARE

## 2024-04-03 VITALS — WEIGHT: 191 LBS | BODY MASS INDEX: 25.31 KG/M2 | HEIGHT: 73 IN | TEMPERATURE: 96.6 F

## 2024-04-03 DIAGNOSIS — R35.0 URINARY FREQUENCY: Primary | ICD-10-CM

## 2024-04-03 DIAGNOSIS — N13.8 BPH WITH OBSTRUCTION/LOWER URINARY TRACT SYMPTOMS: ICD-10-CM

## 2024-04-03 DIAGNOSIS — R79.1 ABNORMAL COAGULATION PROFILE: ICD-10-CM

## 2024-04-03 DIAGNOSIS — Z01.818 PREOP TESTING: ICD-10-CM

## 2024-04-03 DIAGNOSIS — N40.1 BPH WITH OBSTRUCTION/LOWER URINARY TRACT SYMPTOMS: ICD-10-CM

## 2024-04-03 PROCEDURE — 51741 ELECTRO-UROFLOWMETRY FIRST: CPT | Performed by: UROLOGY

## 2024-04-03 PROCEDURE — 99214 OFFICE O/P EST MOD 30 MIN: CPT | Performed by: UROLOGY

## 2024-04-03 PROCEDURE — 51798 US URINE CAPACITY MEASURE: CPT | Performed by: UROLOGY

## 2024-04-03 PROCEDURE — 1159F MED LIST DOCD IN RCRD: CPT | Performed by: UROLOGY

## 2024-04-03 PROCEDURE — 87086 URINE CULTURE/COLONY COUNT: CPT

## 2024-04-03 PROCEDURE — 1126F AMNT PAIN NOTED NONE PRSNT: CPT | Performed by: UROLOGY

## 2024-04-03 PROCEDURE — 1036F TOBACCO NON-USER: CPT | Performed by: UROLOGY

## 2024-04-03 PROCEDURE — 1160F RVW MEDS BY RX/DR IN RCRD: CPT | Performed by: UROLOGY

## 2024-04-03 RX ORDER — SODIUM CHLORIDE, SODIUM LACTATE, POTASSIUM CHLORIDE, CALCIUM CHLORIDE 600; 310; 30; 20 MG/100ML; MG/100ML; MG/100ML; MG/100ML
100 INJECTION, SOLUTION INTRAVENOUS CONTINUOUS
Status: CANCELLED | OUTPATIENT
Start: 2024-04-03

## 2024-04-03 RX ORDER — CEFAZOLIN SODIUM 2 G/100ML
2 INJECTION, SOLUTION INTRAVENOUS ONCE
Status: CANCELLED | OUTPATIENT
Start: 2024-04-03 | End: 2024-04-03

## 2024-04-03 ASSESSMENT — PAIN SCALES - GENERAL: PAINLEVEL: 0-NO PAIN

## 2024-04-03 NOTE — PROGRESS NOTES
Subjective   Hank Rick is a 78 y.o. male with history of BPH s/p HoLEP on 4/2021 with Dr. Ohara and s/p Bladder Botox injections and partial removal of residual adenoma on 1/4/2024.    He continues to have persistent frequency and urgency. He experiences nocturia x3. He complains of weak urinary stream that is improving slightly but  still bothersome.     His PVR today is 98. This is improved from his PVR last visit which was 162. Uroflow study demonstrated voided volume of 67 and maximal flow rate of 6.7 ml/s.     During Botox injections procedure patient was found to have residual adenoma anteriorly which was partially ablated, Botox was then administered.    Patient had inspire implant placed due to WALKER, however, due to complications it was not activated. Patient denies any gross hematuria, dysuria, nausea, vomiting, fevers or chills.       Past Medical History:   Diagnosis Date    Arthritis     Asthma     Cataract     Cerebral vascular accident (CMS/HCC) 05/09/2023    COVID-19 11/15/2022    COVID-19 virus infection    Esophageal obstruction 05/15/2022    Esophageal stricture    Essential tremor 12/29/2022    Essential tremor    GERD (gastroesophageal reflux disease)     Hard of hearing     Hearing aid worn     Hyperlipidemia     Hypertension     Memory impairment     Olecranon bursitis, left elbow 04/26/2016    Olecranon bursitis of left elbow    Personal history of diseases of the blood and blood-forming organs and certain disorders involving the immune mechanism 05/17/2022    History of anemia    Personal history of other diseases of the digestive system 11/13/2014    History of gastroesophageal reflux (GERD)    Personal history of other diseases of the digestive system 01/14/2021    History of hiatal hernia    Personal history of other specified conditions 11/29/2018    History of urinary frequency    Rash and other nonspecific skin eruption 11/02/2021    Skin rash    Salmonella enteritis     Salmonella  enteritis    Sleep apnea     TIA (transient ischemic attack)     Vertebral artery stenosis      Past Surgical History:   Procedure Laterality Date    BACK SURGERY      CATARACT EXTRACTION      CT ANGIO NECK  05/12/2023    CT NECK ANGIO W AND WO IV CONTRAST 5/12/2023 [facility]    CT HEAD ANGIO W AND WO IV CONTRAST  05/12/2023    CT HEAD ANGIO W AND WO IV CONTRAST 5/12/2023 [facility] CT    HERNIA REPAIR      MR HEAD ANGIO WO IV CONTRAST  05/09/2023    MR HEAD ANGIO WO IV CONTRAST 5/9/2023 [facility] MRI    MR NECK ANGIO WO IV CONTRAST  05/09/2023    MR NECK ANGIO WO IV CONTRAST 5/9/2023 [facility] MRI    OTHER SURGICAL HISTORY  01/07/2015    Esophagogastric Fundoplasty Laparoscopic For Paraesophageal Hernia Repair    OTHER SURGICAL HISTORY  03/05/2014    Shoulder Surgery Left    OTHER SURGICAL HISTORY      REFRACTIVE SURGERY  03/05/2014    Corneal LASIK    TONSILLECTOMY  11/18/2014    Tonsillectomy    TOTAL SHOULDER ARTHROPLASTY Right     reverse     Family History   Problem Relation Name Age of Onset    Multiple sclerosis Mother      Colon cancer Father       Current Outpatient Medications   Medication Sig Dispense Refill    acetaminophen (Tylenol 8 HOUR) 650 mg ER tablet Take 2 tablets (1,300 mg) by mouth once daily. Do not crush, chew, or split.      albuterol 90 mcg/actuation inhaler Inhale 2 puffs. Every 4 to 6 hours as needed as directed      amLODIPine (Norvasc) 2.5 mg tablet Take  2 tablets every day , please note change in dose 180 tablet 1    aspirin 81 mg EC tablet Take 1 tablet (81 mg) by mouth once daily. Please re start on 01/17/2024 30 tablet 0    atorvastatin (Lipitor) 40 mg tablet Take 1 tablet (40 mg) by mouth once daily. 90 tablet 2    azelastine (Astelin) 137 mcg (0.1 %) nasal spray Administer 2 sprays into each nostril 2 times a day. 30 mL 5    diclofenac sodium (Voltaren) 1 % gel gel Apply topically 2 times a day as needed.      fexofenadine (Allegra) 180 mg tablet Take 1 tablet (180 mg) by mouth once daily.       fluticasone (Flonase) 50 mcg/actuation nasal spray Administer 1-2 sprays into each nostril once daily.      lidocaine 4 % patch APPLY 1 PATCH TOPICALLY TO AFFECTED AREA ONCE DAILY. LEAVE IN PLACE FOR 12 HOURS, THEN REMOVE AND KEEP OFF FOR 12 HOURS. 15 patch 0    meloxicam (Mobic) 15 mg tablet take 1 tablet by mouth once daily if needed with food for MODERATE PAIN ( PAIN SCALE 4-6 ) 30 tablet 0    memantine (Namenda) 5 mg tablet take 1 tablet by mouth twice a day 180 tablet 3    montelukast (Singulair) 10 mg tablet Take 1 tablet (10 mg) by mouth once daily. 90 tablet 2    omeprazole OTC (PriLOSEC OTC) 20 mg EC tablet Take 1 tablet (20 mg) by mouth once daily.      propranolol LA (Inderal LA) 60 mg 24 hr capsule Take 1 capsule (60 mg) by mouth once daily. 90 capsule 1    sildenafil (Viagra) 100 mg tablet Take 0.5-1 tablets ( mg) by mouth if needed for erectile dysfunction (1 hour prior to sexual activity (max 1 per day)). 30 tablet 6    topiramate (Topamax) 25 mg tablet TAKE 1-2 TABLETS AT BEDTIME 60 tablet 11    traZODone (Desyrel) 50 mg tablet Take 1 tablet (50 mg) by mouth once daily at bedtime. 90 tablet 2     No current facility-administered medications for this visit.     Allergies   Allergen Reactions    Pollen Extracts Other     Runny nose    Gabapentin Anxiety     Social History     Socioeconomic History    Marital status:      Spouse name: Not on file    Number of children: Not on file    Years of education: Not on file    Highest education level: Not on file   Occupational History    Not on file   Tobacco Use    Smoking status: Never    Smokeless tobacco: Never   Vaping Use    Vaping Use: Never used   Substance and Sexual Activity    Alcohol use: Yes     Alcohol/week: 4.0 standard drinks of alcohol     Types: 4 Shots of liquor per week    Drug use: Not Currently     Types: Marijuana     Comment: PAST USE 20 YEARS AGO    Sexual activity: Not on file   Other Topics Concern    Not on file   Social  History Narrative    Not on file     Social Determinants of Health     Financial Resource Strain: Low Risk  (1/13/2024)    Overall Financial Resource Strain (CARDIA)     Difficulty of Paying Living Expenses: Not hard at all   Food Insecurity: Not on file   Transportation Needs: No Transportation Needs (1/13/2024)    PRAPARE - Transportation     Lack of Transportation (Medical): No     Lack of Transportation (Non-Medical): No   Physical Activity: Not on file   Stress: Not on file   Social Connections: Not on file   Intimate Partner Violence: Not on file   Housing Stability: Low Risk  (1/13/2024)    Housing Stability Vital Sign     Unable to Pay for Housing in the Last Year: No     Number of Places Lived in the Last Year: 1     Unstable Housing in the Last Year: No       Review of Systems  Pertinent items are noted in HPI.    Objective     Lab Review  Lab Results   Component Value Date    WBC 8.4 01/14/2024    RBC 3.74 (L) 01/14/2024    HGB 11.7 (L) 01/14/2024    HCT 35.4 (L) 01/14/2024     01/14/2024      Lab Results   Component Value Date    BUN 20 01/14/2024    CREATININE 0.93 01/14/2024      Lab Results   Component Value Date    PSA 0.62 12/05/2022     PVR 162mL   IPSS 7 and 4   Uroflow study demonstrated voided volume of 75 and maximal flow rate of 8.4ml/s.   Urine analysis shows +leukocytes, +blood    Assessment/Plan   Diagnoses and all orders for this visit:  Urinary frequency  -     Measure post void residual  -     Case Request Operating Room: Ablation Transurethral Prostate; Standing  BPH with obstruction/lower urinary tract symptoms  -     Case Request Operating Room: Ablation Transurethral Prostate; Standing  -     Basic Metabolic Panel; Future  -     CBC; Future  -     Protime-INR; Future  -     Urine Culture; Future  Preop testing  -     Basic Metabolic Panel; Future  -     CBC; Future  -     Protime-INR; Future  -     Urine Culture; Future  -     Request for Pre-Admission Testing Visit;  Future  Abnormal coagulation profile  -     Protime-INR; Future  Other orders  -     Place in outpatient/hospital ambulatory surgery; Standing  -     Weigh patient; Standing  -     Measure height and length; Standing  -     Insert and maintain IV line; Standing  -     lactated Ringer's infusion  -     ceFAZolin in dextrose (iso-os) (Ancef) IVPB 2 g    BPH s/p HoLEP on 4/2021 s/p bladder Botox injections and removal of residual adenoma with Holmium laser on 1/4/2024     I discussed in detail the risks associated with the GreenLight laser PVP procedure, which include small risk of urinary incontinence, risk of erectile dysfunction, 60% risk of retrograde ejaculation, and additional risk of hematuria, UTI, and failure to improve urinary symptoms.      All questions were answered to the patient's satisfaction. Patient agrees with the plan and wishes to proceed. Follow-up will be scheduled appropriately.     E&M visit today is associated with current or anticipated ongoing medical care services related to a patient’s single, serious condition or a complex condition.      Scribed for Dr. Alaniz by Katelyn Casalla. I , Dr Alaniz, have personally reviewed and agreed with the information entered by the Virtual Scribe.

## 2024-04-04 LAB — BACTERIA UR CULT: NO GROWTH

## 2024-04-09 ENCOUNTER — APPOINTMENT (OUTPATIENT)
Dept: UROLOGY | Facility: CLINIC | Age: 79
End: 2024-04-09
Payer: MEDICARE

## 2024-04-12 ENCOUNTER — PRE-ADMISSION TESTING (OUTPATIENT)
Dept: PREADMISSION TESTING | Facility: HOSPITAL | Age: 79
End: 2024-04-12
Payer: MEDICARE

## 2024-04-12 VITALS
SYSTOLIC BLOOD PRESSURE: 108 MMHG | TEMPERATURE: 97.7 F | OXYGEN SATURATION: 96 % | DIASTOLIC BLOOD PRESSURE: 61 MMHG | RESPIRATION RATE: 16 BRPM | BODY MASS INDEX: 25.58 KG/M2 | HEIGHT: 73 IN | WEIGHT: 193.01 LBS | HEART RATE: 64 BPM

## 2024-04-12 DIAGNOSIS — N13.8 BPH WITH OBSTRUCTION/LOWER URINARY TRACT SYMPTOMS: ICD-10-CM

## 2024-04-12 DIAGNOSIS — N40.1 BPH WITH OBSTRUCTION/LOWER URINARY TRACT SYMPTOMS: ICD-10-CM

## 2024-04-12 DIAGNOSIS — Z01.818 PREOP TESTING: ICD-10-CM

## 2024-04-12 DIAGNOSIS — R79.1 ABNORMAL COAGULATION PROFILE: ICD-10-CM

## 2024-04-12 LAB
ANION GAP SERPL CALC-SCNC: 12 MMOL/L (ref 10–20)
APPEARANCE UR: CLEAR
BILIRUB UR STRIP.AUTO-MCNC: ABNORMAL MG/DL
BUN SERPL-MCNC: 18 MG/DL (ref 6–23)
CALCIUM SERPL-MCNC: 9.3 MG/DL (ref 8.6–10.3)
CHLORIDE SERPL-SCNC: 102 MMOL/L (ref 98–107)
CO2 SERPL-SCNC: 27 MMOL/L (ref 21–32)
COLOR UR: YELLOW
CREAT SERPL-MCNC: 1.06 MG/DL (ref 0.5–1.3)
EGFRCR SERPLBLD CKD-EPI 2021: 72 ML/MIN/1.73M*2
ERYTHROCYTE [DISTWIDTH] IN BLOOD BY AUTOMATED COUNT: 12.3 % (ref 11.5–14.5)
GLUCOSE SERPL-MCNC: 93 MG/DL (ref 74–99)
GLUCOSE UR STRIP.AUTO-MCNC: NEGATIVE MG/DL
HCT VFR BLD AUTO: 39.6 % (ref 41–52)
HGB BLD-MCNC: 12.5 G/DL (ref 13.5–17.5)
INR PPP: 1 (ref 0.9–1.2)
KETONES UR STRIP.AUTO-MCNC: NEGATIVE MG/DL
LEUKOCYTE ESTERASE UR QL STRIP.AUTO: NEGATIVE
MCH RBC QN AUTO: 30.9 PG (ref 26–34)
MCHC RBC AUTO-ENTMCNC: 31.6 G/DL (ref 32–36)
MCV RBC AUTO: 98 FL (ref 80–100)
NITRITE UR QL STRIP.AUTO: NEGATIVE
NRBC BLD-RTO: ABNORMAL /100{WBCS}
PH UR STRIP.AUTO: 6.5 [PH]
PLATELET # BLD AUTO: 225 X10*3/UL (ref 150–450)
POTASSIUM SERPL-SCNC: 4 MMOL/L (ref 3.5–5.3)
PROT UR STRIP.AUTO-MCNC: NEGATIVE MG/DL
PROTHROMBIN TIME: 9.7 SECONDS (ref 9.3–12.7)
RBC # BLD AUTO: 4.05 X10*6/UL (ref 4.5–5.9)
RBC # UR STRIP.AUTO: NEGATIVE /UL
SODIUM SERPL-SCNC: 137 MMOL/L (ref 136–145)
SP GR UR STRIP.AUTO: 1.01
UROBILINOGEN UR STRIP.AUTO-MCNC: 0.2 MG/DL
WBC # BLD AUTO: 6.5 X10*3/UL (ref 4.4–11.3)

## 2024-04-12 PROCEDURE — 80048 BASIC METABOLIC PNL TOTAL CA: CPT

## 2024-04-12 PROCEDURE — 85610 PROTHROMBIN TIME: CPT

## 2024-04-12 PROCEDURE — 36415 COLL VENOUS BLD VENIPUNCTURE: CPT

## 2024-04-12 PROCEDURE — 85027 COMPLETE CBC AUTOMATED: CPT

## 2024-04-12 PROCEDURE — 99213 OFFICE O/P EST LOW 20 MIN: CPT | Performed by: NURSE PRACTITIONER

## 2024-04-12 PROCEDURE — 81003 URINALYSIS AUTO W/O SCOPE: CPT

## 2024-04-12 ASSESSMENT — ENCOUNTER SYMPTOMS
CONSTITUTIONAL NEGATIVE: 1
ALLERGIC/IMMUNOLOGIC NEGATIVE: 1
EYES NEGATIVE: 1
ENDOCRINE NEGATIVE: 1
PSYCHIATRIC NEGATIVE: 1
GASTROINTESTINAL NEGATIVE: 1
HEMATOLOGIC/LYMPHATIC NEGATIVE: 1

## 2024-04-12 ASSESSMENT — PAIN SCALES - GENERAL: PAINLEVEL_OUTOF10: 7

## 2024-04-12 ASSESSMENT — PAIN - FUNCTIONAL ASSESSMENT: PAIN_FUNCTIONAL_ASSESSMENT: 0-10

## 2024-04-12 ASSESSMENT — PAIN DESCRIPTION - DESCRIPTORS: DESCRIPTORS: ACHING

## 2024-04-12 NOTE — CPM/PAT H&P
CPM/PAT Evaluation    Hank Rick is a 78 y.o. male   Chief Complaint: I am having prostate surgery    HPI:  Patient is a 77 y/o alert and oriented male coming in for PAT for a scheduled Greenlight Photoselective Vaporization or Prostate on 4/26/2024 w/ Dr. Alaniz.    The patient reports he has urinary urgency and nocturia.  Denies hematuria, dysuria, burning w/ urination, frequency, fever or chills.  No lower abdominal or flank pain.  Has chronic back pain d/t L4-5 stenosis  Patient denies chest pain, SOB, ROMERO and NVDC.    Patient also denies Hx: DVT/PE.    Current medications were reviewed and a presurgical mediation schedule was provided.    He has no questions at this time.   Past Medical History:   Diagnosis Date    Arthritis     Asthma (HHS-HCC)     Cataract     Cerebral vascular accident (Multi) 05/09/2023    COVID-19 11/15/2022    COVID-19 virus infection    Esophageal obstruction 05/15/2022    Esophageal stricture    Essential tremor 12/29/2022    Essential tremor    GERD (gastroesophageal reflux disease)     Hard of hearing     Hearing aid worn     Hyperlipidemia     Hypertension     Memory impairment     Olecranon bursitis, left elbow 04/26/2016    Olecranon bursitis of left elbow    Personal history of diseases of the blood and blood-forming organs and certain disorders involving the immune mechanism 05/17/2022    History of anemia    Personal history of other diseases of the digestive system 11/13/2014    History of gastroesophageal reflux (GERD)    Personal history of other diseases of the digestive system 01/14/2021    History of hiatal hernia    Personal history of other specified conditions 11/29/2018    History of urinary frequency    Rash and other nonspecific skin eruption 11/02/2021    Skin rash    Salmonella enteritis     Salmonella enteritis    Sleep apnea     TIA (transient ischemic attack)     Vertebral artery stenosis       Past Surgical History:   Procedure Laterality Date    BACK  SURGERY      CATARACT EXTRACTION      CT ANGIO NECK  05/12/2023    CT NECK ANGIO W AND WO IV CONTRAST 5/12/2023 [facility]    CT HEAD ANGIO W AND WO IV CONTRAST  05/12/2023    CT HEAD ANGIO W AND WO IV CONTRAST 5/12/2023 [facility]    HERNIA REPAIR      MR HEAD ANGIO WO IV CONTRAST  05/09/2023    MR HEAD ANGIO WO IV CONTRAST 5/9/2023 [facility] MRI    MR NECK ANGIO WO IV CONTRAST  05/09/2023    MR NECK ANGIO WO IV CONTRAST 5/9/2023 [facility] MRI    OTHER SURGICAL HISTORY  01/07/2015    Esophagogastric Fundoplasty Laparoscopic For Paraesophageal Hernia Repair    OTHER SURGICAL HISTORY  03/05/2014    Shoulder Surgery Left    OTHER SURGICAL HISTORY      REFRACTIVE SURGERY  03/05/2014    Corneal LASIK    TONSILLECTOMY  11/18/2014    Tonsillectomy    TOTAL SHOULDER ARTHROPLASTY Right     reverse        Allergies   Allergen Reactions    Pollen Extracts Other     Runny nose    Gabapentin Anxiety        Current Outpatient Medications on File Prior to Visit   Medication Sig Dispense Refill    acetaminophen (Tylenol 8 HOUR) 650 mg ER tablet Take 2 tablets (1,300 mg) by mouth once daily. Do not crush, chew, or split.      albuterol 90 mcg/actuation inhaler Inhale 2 puffs. Every 4 to 6 hours as needed as directed      amLODIPine (Norvasc) 2.5 mg tablet Take  2 tablets every day , please note change in dose 180 tablet 1    aspirin 81 mg EC tablet Take 1 tablet (81 mg) by mouth once daily. Please re start on 01/17/2024 30 tablet 0    atorvastatin (Lipitor) 40 mg tablet Take 1 tablet (40 mg) by mouth once daily. 90 tablet 2    azelastine (Astelin) 137 mcg (0.1 %) nasal spray Administer 2 sprays into each nostril 2 times a day. 30 mL 5    diclofenac sodium (Voltaren) 1 % gel gel Apply topically 2 times a day as needed.      fexofenadine (Allegra) 180 mg tablet Take 1 tablet (180 mg) by mouth once daily.      fluticasone (Flonase) 50 mcg/actuation nasal spray Administer 1-2 sprays into each nostril once daily.      lidocaine 4 % patch APPLY 1 PATCH  TOPICALLY TO AFFECTED AREA ONCE DAILY. LEAVE IN PLACE FOR 12 HOURS, THEN REMOVE AND KEEP OFF FOR 12 HOURS. 15 patch 0    meloxicam (Mobic) 15 mg tablet take 1 tablet by mouth once daily if needed with food for MODERATE PAIN ( PAIN SCALE 4-6 ) 30 tablet 0    memantine (Namenda) 5 mg tablet take 1 tablet by mouth twice a day 180 tablet 3    montelukast (Singulair) 10 mg tablet Take 1 tablet (10 mg) by mouth once daily. 90 tablet 2    omeprazole OTC (PriLOSEC OTC) 20 mg EC tablet Take 1 tablet (20 mg) by mouth once daily.      propranolol LA (Inderal LA) 60 mg 24 hr capsule Take 1 capsule (60 mg) by mouth once daily. 90 capsule 1    sildenafil (Viagra) 100 mg tablet Take 0.5-1 tablets ( mg) by mouth if needed for erectile dysfunction (1 hour prior to sexual activity (max 1 per day)). 30 tablet 6    topiramate (Topamax) 25 mg tablet TAKE 1-2 TABLETS AT BEDTIME 60 tablet 11    traZODone (Desyrel) 50 mg tablet Take 1 tablet (50 mg) by mouth once daily at bedtime. 90 tablet 2     No current facility-administered medications on file prior to visit.       Vitals:    04/12/24 0800   BP: 108/61   Pulse: 64   Resp: 16   Temp: 36.5 °C (97.7 °F)   SpO2: 96%       Review of Systems   Constitutional: Negative.    HENT:          Recent inspire implant for renée   Eyes: Negative.    Respiratory:          Asthma  Sleep apnea recent inspire implant 3/12/2024   Cardiovascular:         Hypertension  hyperlipidemia   Gastrointestinal: Negative.    Endocrine: Negative.    Genitourinary:         BPH w/ LUTS and obstruction  Prior HoLep x 2 with botox   Musculoskeletal:         Chronic back pain L4-5 lumbar stenosis   Skin: Negative.    Allergic/Immunologic: Negative.    Neurological:         Hx: CVA on aspirin  Memory decline   Mixed alzheimers/vascular dementia  parkinsonism   Hematological: Negative.    Psychiatric/Behavioral: Negative.        Physical Exam  Vitals and nursing note reviewed.   Constitutional:       Appearance: Normal  appearance.   HENT:      Head: Normocephalic and atraumatic.      Mouth/Throat:      Mouth: Mucous membranes are moist.      Pharynx: Oropharynx is clear.   Eyes:      Pupils: Pupils are equal, round, and reactive to light.   Cardiovascular:      Rate and Rhythm: Normal rate and regular rhythm.      Heart sounds: Normal heart sounds.   Pulmonary:      Effort: Pulmonary effort is normal.      Breath sounds: Normal breath sounds.   Abdominal:      General: Abdomen is flat. Bowel sounds are normal.      Palpations: Abdomen is soft.   Musculoskeletal:         General: Normal range of motion.      Cervical back: Normal range of motion and neck supple.   Skin:     General: Skin is warm and dry.   Neurological:      General: No focal deficit present.      Mental Status: He is alert and oriented to person, place, and time.   Psychiatric:         Mood and Affect: Mood normal.         Behavior: Behavior normal.         Thought Content: Thought content normal.         Judgment: Judgment normal.        PAT AIRWAY:   Airway:     Mallampati::  III    TM distance::  >3 FB    Neck ROM::  Full  Has dental crowns  Does not smoke  3-4 drinks a week  No drug use  No issues with anesthesia  No family issues with anesthesia    Assessment and Plan:   Urinary Frequency, BPH w/ Obstruction and LUTS  Greenlight Photoselective Vaporization or Prostate  Hx: s/p HoLep 4/2021 and 1/4/2024 and has had botox    Hypertension  Managed with amlodipine 5mg daily  Managed with propranolol 60mg daily  Last EKG 1/13/2024    WALKER  Recent Inspire device implant on 3/12/2024 w/ ENT  Had hematoma post op since resolved  Follow up with ENT Dr. 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 Right vertebral artery is dominant.  Atherosclerotic disease of bilateral vertebral artery origins  partially visualized intradural vertebral arteries demonstrates  severe atherosclerotic disease bilaterally. Left vertebral artery  predominantly terminates as the PICA. Right vertebral artery is  dominant.    ASA III  RCRI - 1 points  Class II Risk 6%  VERONIKA - points Risk for WALKER known WALKER inspire device implanted 3/12/2024  NSQIP - Predicted length of stay 0 days  ARISCAT - 3 points Low Risk 1.6%  DASI 34.7 Points 7.01 Mets  DIMAS - 0.3%  JHFRAT - 7 points moderate risk for falls  Clearance - not indicated  PAT Testing - none  CBC, BMP, PT/PTT per Dr. Alaniz  Normal Urine Culture 4/3/2024    Face to Face patient contact time 40 minutes w/ chart review    Irina Barrera, APRN-CNP 4/12/2024 7:58 AM  Results for orders placed or performed in visit on 04/12/24 (from the past 24 hour(s))   Basic Metabolic Panel   Result Value Ref Range    Glucose 93 74 - 99 mg/dL    Sodium 137 136 - 145 mmol/L    Potassium 4.0 3.5 - 5.3 mmol/L    Chloride 102 98 - 107 mmol/L    Bicarbonate 27 21 - 32 mmol/L    Anion Gap 12 10 - 20 mmol/L    Urea Nitrogen 18 6 - 23 mg/dL    Creatinine 1.06 0.50 - 1.30 mg/dL    eGFR 72 >60 mL/min/1.73m*2    Calcium 9.3 8.6 - 10.3 mg/dL   CBC   Result Value Ref Range    WBC 6.5 4.4 - 11.3 x10*3/uL    nRBC      RBC 4.05 (L) 4.50 - 5.90 x10*6/uL    Hemoglobin 12.5 (L) 13.5 - 17.5 g/dL    Hematocrit 39.6 (L) 41.0 - 52.0 %    MCV 98 80 - 100 fL    MCH 30.9 26.0 - 34.0 pg    MCHC 31.6 (L) 32.0 - 36.0 g/dL    RDW 12.3 11.5 - 14.5 %    Platelets 225 150 - 450 x10*3/uL   Protime-INR   Result Value Ref Range    Protime 9.7 9.3 - 12.7 seconds    INR 1.0 0.9 - 1.2   Urinalysis with Reflex Culture and Microscopic   Result Value Ref Range    Color, Urine Yellow Straw, Yellow    Appearance,  Urine Clear Clear    Specific Gravity, Urine 1.015 1.005 - 1.035    pH, Urine 6.5 5.0, 5.5, 6.0, 6.5, 7.0, 7.5, 8.0    Protein, Urine NEGATIVE NEGATIVE, TRACE mg/dL    Glucose, Urine NEGATIVE NEGATIVE mg/dL    Blood, Urine NEGATIVE NEGATIVE    Ketones, Urine NEGATIVE NEGATIVE mg/dL    Bilirubin, Urine SMALL (1+) (A) NEGATIVE    Urobilinogen, Urine 0.2 0.2, 1.0 mg/dL    Nitrite, Urine NEGATIVE NEGATIVE    Leukocyte Esterase, Urine NEGATIVE NEGATIVE

## 2024-04-12 NOTE — PREPROCEDURE INSTRUCTIONS
Medication List            Accurate as of April 12, 2024  8:14 AM. Always use your most recent med list.                acetaminophen 650 mg ER tablet  Commonly known as: Tylenol 8 HOUR  Medication Adjustments for Surgery: Take morning of surgery with sip of water, no other fluids     albuterol 90 mcg/actuation inhaler  Medication Adjustments for Surgery: Take morning of surgery with sip of water, no other fluids     amLODIPine 2.5 mg tablet  Commonly known as: Norvasc  Take  2 tablets every day , please note change in dose  Medication Adjustments for Surgery: Take morning of surgery with sip of water, no other fluids     aspirin 81 mg EC tablet  Take 1 tablet (81 mg) by mouth once daily. Please re start on 01/17/2024  Medication Adjustments for Surgery: Stop 7 days before surgery  Notes to patient: Check with prescriber     atorvastatin 40 mg tablet  Commonly known as: Lipitor  Take 1 tablet (40 mg) by mouth once daily.  Medication Adjustments for Surgery: Continue until night before surgery     azelastine 137 mcg (0.1 %) nasal spray  Commonly known as: Astelin  Administer 2 sprays into each nostril 2 times a day.  Medication Adjustments for Surgery: Take morning of surgery with sip of water, no other fluids     diclofenac sodium 1 % gel  Commonly known as: Voltaren  Medication Adjustments for Surgery: Stop 7 days before surgery  Notes to patient: Last day 4/19/2024     fexofenadine 180 mg tablet  Commonly known as: Allegra  Medication Adjustments for Surgery: Take morning of surgery with sip of water, no other fluids     fluticasone 50 mcg/actuation nasal spray  Commonly known as: Flonase  Medication Adjustments for Surgery: Take morning of surgery with sip of water, no other fluids     Lidocaine Pain Relief 4 % patch  Generic drug: lidocaine  APPLY 1 PATCH TOPICALLY TO AFFECTED AREA ONCE DAILY. LEAVE IN PLACE FOR 12 HOURS, THEN REMOVE AND KEEP OFF FOR 12 HOURS.  Medication Adjustments for Surgery: Stop 1 day  before surgery  Notes to patient: Last dose 4/25/24     meloxicam 15 mg tablet  Commonly known as: Mobic  take 1 tablet by mouth once daily if needed with food for MODERATE PAIN ( PAIN SCALE 4-6 )  Medication Adjustments for Surgery: Stop 7 days before surgery  Notes to patient: Last dose 4/19/2024     memantine 5 mg tablet  Commonly known as: Namenda  take 1 tablet by mouth twice a day  Medication Adjustments for Surgery: Take morning of surgery with sip of water, no other fluids     montelukast 10 mg tablet  Commonly known as: Singulair  Take 1 tablet (10 mg) by mouth once daily.  Medication Adjustments for Surgery: Take morning of surgery with sip of water, no other fluids     omeprazole OTC 20 mg EC tablet  Commonly known as: PriLOSEC OTC  Medication Adjustments for Surgery: Take morning of surgery with sip of water, no other fluids     propranolol LA 60 mg 24 hr capsule  Commonly known as: Inderal LA  Take 1 capsule (60 mg) by mouth once daily.  Medication Adjustments for Surgery: Take morning of surgery with sip of water, no other fluids     sildenafil 100 mg tablet  Commonly known as: Viagra  Take 0.5-1 tablets ( mg) by mouth if needed for erectile dysfunction (1 hour prior to sexual activity (max 1 per day)).  Medication Adjustments for Surgery: Stop 3 days before surgery  Notes to patient: Last dose 4/23/2024     topiramate 25 mg tablet  Commonly known as: Topamax  TAKE 1-2 TABLETS AT BEDTIME  Medication Adjustments for Surgery: Continue until night before surgery     traZODone 50 mg tablet  Commonly known as: Desyrel  Take 1 tablet (50 mg) by mouth once daily at bedtime.  Medication Adjustments for Surgery: Continue until night before surgery              NPO Instructions:    Do not eat any food after midnight the night before your surgery/procedure.  You may have clear liquids until TWO hours before surgery/procedure. This includes water, black tea/coffee, (no milk or cream) apple juice and  electrolyte drinks (Gatorade).  You may chew gum up to TWO hours before your surgery/procedure.    Additional Instructions:     Seven/Six Days before Surgery:  Review your medication instructions, stop indicated medications  Five Days before Surgery:  Review your medication instructions, stop indicated medications  Three Days before Surgery:  Review your medication instructions, stop indicated medications  The Day before Surgery:  Review your medication instructions, stop indicated medications  You will be contacted regarding the time of your arrival to facility and surgery time  Do not eat any food after Midnight  Day of Surgery:  Review your medication instructions, take indicated medications  You may have clear liquids until TWO hours before surgery/procedure.  This includes water, black tea/coffee, (no milk or cream) apple juice and electrolyte drinks (Gatorade)  You may chew gum up to TWO hours before your surgery/procedure  Wear  comfortable loose fitting clothing  Do not use moisturizers, creams, lotions or perfume  All jewelry and valuables should be left at home  CONTACT SURGEON'S OFFICE IF YOU DEVELOP:  * Fever = 100.4 F   * New respiratory symptoms (e.g. cough, shortness of breath, respiratory distress, sore throat)  * Recent loss of taste or smell  *Flu like symptoms such as headache, fatigue or gastrointestinal symptoms  * You develop any open sores, shingles, burning or painful urination   AND/OR:  * You no longer wish to have the surgery.  * Any other personal circumstances change that may lead to the need to cancel or defer this surgery.  *You were admitted to any hospital within one week of your planned procedure.    SMOKING:  *Quitting smoking can make a huge difference to your health and recovery from surgery.    *If you need help with quitting, call 4-800-QUIT-NOW.    THE DAY BEFORE SURGERY:  *Do not eat any food after midnight the night before your surgery.   *You may have up to TEN OUNCES of  clear liquids until TWO hours before your instructed ARRIVAL TIME to hospital. This includes water, black tea/coffee, (no milk or cream) apple juice, clear broth and electrolyte drinks (Gatorade). Please avoid clear liquids that are red in color.   *You may chew gum/mints up to TWO hours before your surgery/procedure.    SURGICAL TIME:  *You will be contacted between 2 p.m. and 3 p.m. the business day before your surgery with your arrival time.  *If you haven't received a call by 3pm, call [phone]  *Scheduled surgery times may change and you will be notified if this occurs-check your personal voicemail for any updates.    ON THE MORNING OF SURGERY:  *Wear comfortable, loose fitting clothing.   *Do not use moisturizers, creams, lotions or perfume.  *All jewelry and valuables should be left at home.  *Prosthetic devices such as contact lenses, hearing aids, dentures, eyelash extensions, hairpins and body piercing must be removed before surgery.    BRING WITH YOU:  *Photo ID and insurance card  *Current list of medications and allergies  *Pacemaker/Defibrillator/Heart stent cards  *CPAP machine and mask  *Slings/splints/crutches  *Copy of your complete Advanced Directive/DHPOA-if applicable  *Neurostimulator implant remote    PARKING AND ARRIVAL:  *Check in at the Main Entrance desk and let them know you are here for surgery.    IF YOU ARE HAVING OUTPATIENT/SAME DAY SURGERY:  *A responsible adult MUST accompany you at the time of discharge and stay with you for 24 hours after your surgery.  *You may NOT drive yourself home after surgery.  *You may use a taxi or ride sharing service ([employer], Uber) to return home ONLY if you are accompanied by a friend or family member.  *Instructions for resuming your medications will be provided by your surgeon.    Thank you for coming to Pre Admission testing.     If I have prescribe medication please don't forget to  at your pharmacy.     Any questions about today's visit  call [phone] and leave a message in the general mailbox.    Patient instructed to ambulate as soon as possible postoperatively to decrease thromboembolic risk.    Irina Barrera, APRN-CNP    Thank you for visiting [facility].  If you have any changes to your health condition, please call the surgeons office to alert them and give them details of your symptoms.    Preoperative Fasting Guidelines    Why must I stop eating and drinking near surgery time?  With sedation, food or liquid in your stomach can enter your lungs causing serious complications  Increases nausea and vomiting    When do I need to stop eating and drinking before my surgery?  Do not eat any food after midnight the night before your surgery/procedure.  You may have up to TEN ounces of clear liquid until TWO hours before your instructed arrival time to the hospital.  This includes water, black tea/coffee, (no milk or cream) apple juice, and electrolyte drinks (Gatorade)  You may chew gum until TWO hours before your surgery/procedure      Additional Instructions:     The Day before Surgery:  -Review your medication instructions, stop indicated medications  -You will be contacted in the evening regarding the time of your arrival to facility and surgery time    Day of Surgery:  -Review your medication instructions, take indicated medications  -Wear comfortable loose fitting clothing  -Do not use moisturizers, creams, lotions or perfume  -All jewelry and valuables should be left at home              Preoperative Brain Exercises    What are brain exercises?  A brain exercise is any activity that engages your thinking (cognitive) skills.    What types of activities are considered brain exercises?  Jigsaw puzzles, crossword puzzles, word jumble, memory games, word search, and many more.  Many can be found free online or on your phone via a mobile ester.    Why should I do brain exercises before my surgery?  More recent research  has shown brain exercise before surgery can lower the risk of postoperative delirium (confusion) which can be especially important for older adults.  Patients who did brain exercises for 5 to 10 hours the days before surgery, cut their risk of postoperative delirium in half up to 1 week after surgery.                  [facility]    Preoperative Deep Breathing Exercises    Why it is important to do deep breathing exercises before my surgery?  Deep breathing exercises strengthen your breathing muscles.  This helps you to recover after your surgery and decreases the chance of breathing complications.      How are the deep breathing exercises done?  Sit straight with your back supported.  Breathe in deeply and slowly through your nose. Your lower rib cage should expand and your abdomen may move forward.  Hold that breath for 3 to 5 seconds.  Breathe out through pursed lips, slowly and completely.  Rest and repeat 10 times every hour while awake.  Rest longer if you become dizzy or lightheaded.                [facility]    Preoperative Deep Breathing Exercises    Why it is important to do deep breathing exercises before my surgery?  Deep breathing exercises strengthen your breathing muscles.  This helps you to recover after your surgery and decreases the chance of breathing complications.      How are the deep breathing exercises done?  Sit straight with your back supported.  Breathe in deeply and slowly through your nose. Your lower rib cage should expand and your abdomen may move forward.  Hold that breath for 3 to 5 seconds.  Breathe out through pursed lips, slowly and completely.  Rest and repeat 10 times every hour while awake.  Rest longer if you become dizzy or lightheaded.      Patient Information: Incentive Spirometer  What is an incentive spirometer?  An incentive spirometer is a device used before and after surgery to “exercise” your lungs.  It helps you to  take deeper breaths to expand your lungs.  Below is an example of a basic incentive spirometer.  The device you receive may differ slightly but they all function the same.    Why do I need to use an incentive spirometer?  Using your incentive spirometer prepares your lungs for surgery and helps prevent lung problems after surgery.  How do I use my incentive spirometer?  When you're using your incentive spirometer, make sure to breathe through your mouth. If you breathe through your nose, the incentive spirometer won't work properly. You can hold your nose if you have trouble.  If you feel dizzy at any time, stop and rest. Try again at a later time.  Follow the steps below:  Set up your incentive spirometer, expand the flexible tubing and connect to the outlet.  Sit upright in a chair or bed. Hold the incentive spirometer at eye level.   Put the mouthpiece in your mouth and close your lips tightly around it. Slowly breathe out (exhale) completely.  Breathe in (inhale) slowly through your mouth as deeply as you can. As you take a breath, you will see the piston rise inside the large column. While the piston rises, the indicator should move upwards. It should stay in between the 2 arrows (see Figure).  Try to get the piston as high as you can, while keeping the indicator between the arrows.   If the indicator doesn't stay between the arrows, you're breathing either too fast or too slow.  When you get it as high as you can, hold your breath for 10 seconds, or as long as possible. While you're holding your breath, the piston will slowly fall to the base of the spirometer.  Once the piston reaches the bottom of the spirometer, breathe out slowly through your mouth. Rest for a few seconds.  Repeat 10 times. Try to get the piston to the same level with each breath.  Repeat every hour while awake  You can carefully clean the outside of the mouthpiece with an alcohol wipe or soap and water.      Patient and Family Education              Ways You Can Help Prevent Blood Clots             This handout explains some simple things you can do to help prevent blood clots.      Blood clots are blockages that can form in the body's veins. When a blood clot forms in your deep veins, it may be called a deep vein thrombosis, or DVT for short. Blood clots can happen in any part of the body where blood flows, but they are most common in the arms and legs. If a piece of a blood clot breaks free and travels to the lungs, it is called a pulmonary embolus (PE). A PE can be a very serious problem.         Being in the hospital or having surgery can raise your chances of getting a blood clot because you may not be well enough to move around as much as you normally do.         Ways you can help prevent blood clots in the hospital         Wearing SCDs. SCDs stands for Sequential Compression Devices.   SCDs are special sleeves that wrap around your legs  They attach to a pump that fills them with air to gently squeeze your legs every few minutes.   This helps return the blood in your legs to your heart.   SCDs should only be taken off when walking or bathing.   SCDs may not be comfortable, but they can help save your life.               Wearing compression stockings - if your doctor orders them. These special snug fitting stockings gently squeeze your legs to help blood flow.       Walking. Walking helps move the blood in your legs.   If your doctor says it is ok, try walking the halls at least   5 times a day. Ask us to help you get up, so you don't fall.      Taking any blood thinning medicines your doctor orders.        Page 1 of 2     [facility]; 3/23   Ways you can help prevent blood clots at home       Wearing compression stockings - if your doctor orders them. ? Walking - to help move the blood in your legs.       Taking any blood thinning medicines your doctor orders.      Signs of a blood clot or PE      Tell your doctor or nurse know  right away if you have of the problems listed below.    If you are at home, seek medical care right away. Call 911 for chest pain or problems breathing.               Signs of a blood clot (DVT) - such as pain,  swelling, redness or warmth in your arm or leg      Signs of a pulmonary embolism (PE) - such as chest     pain or feeling short of breath

## 2024-04-13 LAB — HOLD SPECIMEN: NORMAL

## 2024-04-19 ENCOUNTER — LAB (OUTPATIENT)
Dept: LAB | Facility: LAB | Age: 79
End: 2024-04-19
Payer: MEDICARE

## 2024-04-19 DIAGNOSIS — L29.8 OTHER PRURITUS: Primary | ICD-10-CM

## 2024-04-19 LAB
ALBUMIN SERPL BCP-MCNC: 4 G/DL (ref 3.4–5)
ALP SERPL-CCNC: 67 U/L (ref 33–136)
ALT SERPL W P-5'-P-CCNC: 17 U/L (ref 10–52)
ANION GAP SERPL CALC-SCNC: 13 MMOL/L (ref 10–20)
AST SERPL W P-5'-P-CCNC: 16 U/L (ref 9–39)
BASOPHILS # BLD AUTO: 0.05 X10*3/UL (ref 0–0.1)
BASOPHILS NFR BLD AUTO: 0.7 %
BILIRUB DIRECT SERPL-MCNC: 0.2 MG/DL (ref 0–0.3)
BILIRUB SERPL-MCNC: 0.6 MG/DL (ref 0–1.2)
BUN SERPL-MCNC: 30 MG/DL (ref 6–23)
CALCIUM SERPL-MCNC: 8.9 MG/DL (ref 8.6–10.3)
CHLORIDE SERPL-SCNC: 103 MMOL/L (ref 98–107)
CO2 SERPL-SCNC: 23 MMOL/L (ref 21–32)
CREAT SERPL-MCNC: 1.19 MG/DL (ref 0.5–1.3)
EGFRCR SERPLBLD CKD-EPI 2021: 63 ML/MIN/1.73M*2
EOSINOPHIL # BLD AUTO: 0.3 X10*3/UL (ref 0–0.4)
EOSINOPHIL NFR BLD AUTO: 4.4 %
ERYTHROCYTE [DISTWIDTH] IN BLOOD BY AUTOMATED COUNT: 12.2 % (ref 11.5–14.5)
GLUCOSE SERPL-MCNC: 137 MG/DL (ref 74–99)
HCT VFR BLD AUTO: 39.2 % (ref 41–52)
HGB BLD-MCNC: 12.4 G/DL (ref 13.5–17.5)
IMM GRANULOCYTES # BLD AUTO: 0.01 X10*3/UL (ref 0–0.5)
IMM GRANULOCYTES NFR BLD AUTO: 0.1 % (ref 0–0.9)
LYMPHOCYTES # BLD AUTO: 1.82 X10*3/UL (ref 0.8–3)
LYMPHOCYTES NFR BLD AUTO: 26.5 %
MCH RBC QN AUTO: 30.5 PG (ref 26–34)
MCHC RBC AUTO-ENTMCNC: 31.6 G/DL (ref 32–36)
MCV RBC AUTO: 97 FL (ref 80–100)
MONOCYTES # BLD AUTO: 0.71 X10*3/UL (ref 0.05–0.8)
MONOCYTES NFR BLD AUTO: 10.3 %
NEUTROPHILS # BLD AUTO: 3.97 X10*3/UL (ref 1.6–5.5)
NEUTROPHILS NFR BLD AUTO: 58 %
NRBC BLD-RTO: ABNORMAL /100{WBCS}
PLATELET # BLD AUTO: 231 X10*3/UL (ref 150–450)
POTASSIUM SERPL-SCNC: 4 MMOL/L (ref 3.5–5.3)
PROT SERPL-MCNC: 6.5 G/DL (ref 6.4–8.2)
RBC # BLD AUTO: 4.06 X10*6/UL (ref 4.5–5.9)
SODIUM SERPL-SCNC: 135 MMOL/L (ref 136–145)
T4 FREE SERPL-MCNC: 1.3 NG/DL (ref 0.9–1.7)
TSH SERPL DL<=0.05 MIU/L-ACNC: 0.99 MIU/L (ref 0.27–4.2)
WBC # BLD AUTO: 6.9 X10*3/UL (ref 4.4–11.3)

## 2024-04-19 PROCEDURE — 82248 BILIRUBIN DIRECT: CPT

## 2024-04-19 PROCEDURE — 84439 ASSAY OF FREE THYROXINE: CPT | Mod: WAIVER OF LIABILITY ON FILE

## 2024-04-19 PROCEDURE — 80053 COMPREHEN METABOLIC PANEL: CPT

## 2024-04-19 PROCEDURE — 86003 ALLG SPEC IGE CRUDE XTRC EA: CPT | Mod: WAIVER OF LIABILITY ON FILE

## 2024-04-19 PROCEDURE — 84443 ASSAY THYROID STIM HORMONE: CPT | Mod: WAIVER OF LIABILITY ON FILE

## 2024-04-19 PROCEDURE — 82657 ENZYME CELL ACTIVITY: CPT

## 2024-04-19 PROCEDURE — 36415 COLL VENOUS BLD VENIPUNCTURE: CPT

## 2024-04-19 PROCEDURE — 85025 COMPLETE CBC W/AUTO DIFF WBC: CPT

## 2024-04-20 LAB — MILK IGE QN: <0.1 KU/L

## 2024-04-23 LAB
A-GALACTOSIDASE A SERPL-CCNC: 0.15 U/L (ref 0.07–0.46)
CLINICAL BIOCHEMIST REVIEW: NORMAL
PROVIDER SIGNING NAME: NORMAL

## 2024-04-25 ENCOUNTER — OFFICE VISIT (OUTPATIENT)
Dept: SLEEP MEDICINE | Facility: HOSPITAL | Age: 79
End: 2024-04-25
Payer: MEDICARE

## 2024-04-25 VITALS
HEART RATE: 56 BPM | OXYGEN SATURATION: 95 % | DIASTOLIC BLOOD PRESSURE: 83 MMHG | BODY MASS INDEX: 25.2 KG/M2 | SYSTOLIC BLOOD PRESSURE: 133 MMHG | TEMPERATURE: 97.6 F | WEIGHT: 191 LBS

## 2024-04-25 DIAGNOSIS — G47.33 OSA (OBSTRUCTIVE SLEEP APNEA): Primary | ICD-10-CM

## 2024-04-25 PROCEDURE — 95977 ALYS CPLX CN NPGT PRGRMG: CPT | Performed by: STUDENT IN AN ORGANIZED HEALTH CARE EDUCATION/TRAINING PROGRAM

## 2024-04-25 PROCEDURE — 1160F RVW MEDS BY RX/DR IN RCRD: CPT | Performed by: STUDENT IN AN ORGANIZED HEALTH CARE EDUCATION/TRAINING PROGRAM

## 2024-04-25 PROCEDURE — 1159F MED LIST DOCD IN RCRD: CPT | Performed by: STUDENT IN AN ORGANIZED HEALTH CARE EDUCATION/TRAINING PROGRAM

## 2024-04-25 PROCEDURE — 3079F DIAST BP 80-89 MM HG: CPT | Performed by: STUDENT IN AN ORGANIZED HEALTH CARE EDUCATION/TRAINING PROGRAM

## 2024-04-25 PROCEDURE — 1126F AMNT PAIN NOTED NONE PRSNT: CPT | Performed by: STUDENT IN AN ORGANIZED HEALTH CARE EDUCATION/TRAINING PROGRAM

## 2024-04-25 PROCEDURE — G2211 COMPLEX E/M VISIT ADD ON: HCPCS | Performed by: STUDENT IN AN ORGANIZED HEALTH CARE EDUCATION/TRAINING PROGRAM

## 2024-04-25 PROCEDURE — 3075F SYST BP GE 130 - 139MM HG: CPT | Performed by: STUDENT IN AN ORGANIZED HEALTH CARE EDUCATION/TRAINING PROGRAM

## 2024-04-25 PROCEDURE — 1036F TOBACCO NON-USER: CPT | Performed by: STUDENT IN AN ORGANIZED HEALTH CARE EDUCATION/TRAINING PROGRAM

## 2024-04-25 PROCEDURE — 99214 OFFICE O/P EST MOD 30 MIN: CPT | Mod: GC | Performed by: STUDENT IN AN ORGANIZED HEALTH CARE EDUCATION/TRAINING PROGRAM

## 2024-04-25 PROCEDURE — 99214 OFFICE O/P EST MOD 30 MIN: CPT | Performed by: STUDENT IN AN ORGANIZED HEALTH CARE EDUCATION/TRAINING PROGRAM

## 2024-04-25 ASSESSMENT — ENCOUNTER SYMPTOMS
PSYCHIATRIC NEGATIVE: 1
RESPIRATORY NEGATIVE: 1
NEUROLOGICAL NEGATIVE: 1
CONSTITUTIONAL NEGATIVE: 1
CARDIOVASCULAR NEGATIVE: 1

## 2024-04-25 ASSESSMENT — PAIN SCALES - GENERAL: PAINLEVEL: 0-NO PAIN

## 2024-04-25 ASSESSMENT — LIFESTYLE VARIABLES: HOW OFTEN DO YOU HAVE A DRINK CONTAINING ALCOHOL: MONTHLY OR LESS

## 2024-04-25 NOTE — PATIENT INSTRUCTIONS
[facility] Sleep Medicine  [facility]  [address]  [facility] [address]  [phone]       NAME: Hank Rick   DATE: 04/25/24    Your Sleep Provider Today: Stan Lopez MD  Your Primary Care Physician: Sonu Larsoe MD   Your Referring Provider: No ref. provider found    DIAGNOSIS:   No diagnosis found.    Thank you for coming to the Sleep Medicine Clinic today! Your sleep medicine provider today was: Stan Lopez MD Below is a summary of your treatment plan, other important information, and our contact numbers:      TREATMENT PLAN     - your starting level (3) is now one more voltage than you came in today   - continue to uptitrate as tolerated      Follow-up Appointment:   2 month      IMPORTANT INFORMATION     Call 911 for medical emergencies.  Our offices are generally open from Monday-Friday, 9 am - 5 pm.  If you need to get in touch with me, you may either call me and my team(number is below) or you can use SendtoNews.  If a referral for a test, for CPAP, or for another specialist was made, and you have not heard about scheduling this within a week, please call scheduling at [phone] ([phone]).  If you are unable to make your appointment for clinic or an overnight study, kindly call the office at least 48 hours in advance to cancel and reschedule.  If you are on CPAP, please bring your device's card or the device to each clinic appointment.   There are no supporting services by either the sleep doctors or their staff on weekends and Holidays, or after 5 PM on weekdays.   If you have been asked to come to a sleep study, make sure you bring toiletries, a comfy pillow, and any nighttime medications that you may regularly take. Also be sure to eat dinner before you arrive. We generally do not provide meals.      PRESCRIPTIONS     We require 7 days advanced notice for prescription refills. If we do not receive the request in this time, we  cannot guarantee that your medication will be refilled in time.      IMPORTANT PHONE NUMBERS     Sleep Medicine Clinic Fax: [phone]  Appointments (for Adult Sleep Clinic): [phone] - option 2  Appointments (For Sleep Studies): [phone] - option 3  Behavioral Sleep Medicine: [phone]  Sleep Surgery: [phone]  ENT (Otolaryngology): [phone]  Headache Clinic (Neurology): [phone]  Neurology: [phone]  Psychiatry: [phone]  Pulmonary Function Testing (PFT) Center: [phone]  Pulmonary Medicine: [phone]  [employer] (DME): [phone]  [employer] (DME): [phone]  [facility] ([facility]): [phone]      OUR ADULT SLEEP MEDICINE TEAM   Please do not hesitate to call the office or sleep nurse with any questions between appointments:    Adult Sleep Nurses (Keily Caceres, RN and Patrica Cedillo RN):  For clinical questions and refilling prescriptions: [phone]  Email sleep diaries and other documents at: [email or web address]    Adult Sleep Medicine Secretaries:  Inga Leung (For Wes/Wiseman/Krise/Strohl/Yeh/Balderrama):   P: [phone]  F: [phone]  Pastora White (For Castillo/Guggenbiller): P: [phone]  Fax: [phone]  Romi Gutierrez (For Jurcevic/Blank): P: [phone]  F: [phone]  Jennifer Barnett (For Ebbe): P: [phone]  F: [phone]  Laura Aburto (For Joanne/Sherry/Addi): P: [phone]  F: [phone]  Alba Gregory (For Adan/Harry): P: [phone]  F: [phone]     Adult Sleep Medicine Advanced Practice Providers:  Mickey Simmons ([place], [place])  Fabiana Powell ([facility])  Eleanor Valentine CNP ([place], [place], [place])  Beryl Johnston CNP ([place], [place], [place])  Cherelle Spence ([facility] [place], [place])  Angélica Mcdonald CNP (formerly [facility])        OUR SLEEP TESTING LOCATIONS     Our team will contact you to schedule your sleep study, however,  "you can contact us as follow:  Main Phone Line (scheduling only): [phone] ([phone]), option 3  Adult and Pediatric Locations   [place] (6 years and older): [facility] - 4th floor ([address] [facility], [facility]) After hours line: [phone]  [facility] (Main campus: All ages): [facility], 6th floor. After hours line: [phone]   [place] (5 years and older; younger considered on case-by-case basis): [address]; [address], [address]. Scheduling  After hours line: [phone]   [place] (6 years and older): [address]; [address]; [address]   [place] (6 years and older): [address] [facility], [address]  After hours line: [phone]   [place] (13 years and older) in [place]: [address], 2nd floor  After hours line: [phone]   [place] (13 year and older): [address]  After hours line: [phone]     Adult Only Locations:   [place] (18 years and older): [address] [facility], 2nd floor   [place] (18 years and older): [address] [facility]; 4th floor  After hours line: [phone]   [place] (18 years and older) at [place]: [id number] [place] [address]  After hours line: [phone]          CONTACTING YOUR SLEEP MEDICINE PROVIDER     Send a message directly to your provider through \"My Chart\", which is the email service through your  Records Account: [email or web address]   Call [phone] and leave a message. One of the administrative assistants will forward the message to your sleep medicine provider through \"My Chart\" and/or email.     Your sleep medicine provider for this visit was: Stan Lopez MD    "

## 2024-04-25 NOTE — PROGRESS NOTES
Patient: Hank Rick    [id number]  : 1945 -- AGE 78 y.o.    Provider: Parker Watson DO     Location [facility]   Service Date: 2024              [facility]  Followup Visit Note    HISTORY OF PRESENT ILLNESS     HISTORY OF PRESENT ILLNESS   Hank Rick is a 78 y.o. male with h/o WALKER who presents to a [facility] for followup.     Assessment and plan from last visit: 3/8/2024    78 yo male here for f/u visit     # WALKER  - he had a HSAT that showed mod WALKER with 4% AHI around 25 and 3% AHI around 38.  - started on CPAP, diffuculty tolerating. Mask discomfort and trouble with leak  - Patient was implanted with HGNS therapy for WALKER on 2024 by Dr. Bob.  - Incision healing well, tongue motion normal  -Patient was here for activation today.      ST: 0.7v  FT: 1.0v  Outgoing setting 0.8-1.8 (@1.0v)  Start Delay: 30 min, Pause: 15 min, Therapy length: 9 hr  Patient tolerated the activation process well  Use of remote was educated and INSPIRE therapy remote was paired via bluetooth with patient's phone.       Current History    On today's visit, doing well since last visit. Swelling just about completely resolved. After last activation visit he had some mild snoring for a few days that has resolved. He has increased to level 10 on the device. He is waking up 4x nightly in setting of nocturia. He attributes this to prostate issues and is having a urological procedure tomorrow. He is not pausing the therapy waking up in the middle of the night.     Daytime Symptoms    Patient reports DAYTIME SYMPTOMS: no daytime symptoms  Patient denies daytime symptoms including: Denies: feeling sleepy when driving    Naps: Yes, for 7times per week for 60min. Naps are refreshing  Fatigue: denies feeling fatigue    ESS- 8  JESSIE- 6  FOSQ- 33      REVIEW OF SYSTEMS     REVIEW OF SYSTEMS  Review of Systems   Constitutional: Negative.    HENT:  Negative.     Respiratory: Negative.     Cardiovascular: Negative.    Genitourinary: Negative.    Skin: Negative.    Neurological: Negative.    Psychiatric/Behavioral: Negative.           ALLERGIES AND MEDICATIONS     ALLERGIES  Allergies   Allergen Reactions    Gabapentin Anxiety    Pollen Extracts Other     Runny nose       MEDICATIONS: He has a current medication list which includes the following prescription(s): acetaminophen - Take 2 tablets (1,300 mg) by mouth once daily as needed. Do not crush, chew, or split, albuterol - Inhale 2 puffs. Every 4 to 6 hours as needed as directed, amlodipine - Take  2 tablets every day , please note change in dose, aspirin - Take 1 tablet (81 mg) by mouth once daily. Please re start on 01/17/2024, atorvastatin - Take 1 tablet (40 mg) by mouth once daily, azelastine - Administer 2 sprays into each nostril 2 times a day, diclofenac sodium - Apply topically 2 times a day as needed, fexofenadine - Take 1 tablet (180 mg) by mouth once daily, fluticasone - Administer 1-2 sprays into each nostril once daily, lidocaine - APPLY 1 PATCH TOPICALLY TO AFFECTED AREA ONCE DAILY. LEAVE IN PLACE FOR 12 HOURS, THEN REMOVE AND KEEP OFF FOR 12 HOURS (Patient not taking: Reported on 4/12/2024), meloxicam - take 1 tablet by mouth once daily if needed with food for MODERATE PAIN ( PAIN SCALE 4-6 ) (Patient not taking: Reported on 4/12/2024), memantine - take 1 tablet by mouth twice a day, montelukast - Take 1 tablet (10 mg) by mouth once daily, omeprazole otc - Take 1 tablet (20 mg) by mouth once daily, propranolol la - Take 1 capsule (60 mg) by mouth once daily, sildenafil - Take 0.5-1 tablets ( mg) by mouth if needed for erectile dysfunction (1 hour prior to sexual activity (max 1 per day)) (Patient not taking: Reported on 4/12/2024), topiramate - TAKE 1-2 TABLETS AT BEDTIME, and trazodone - Take 1 tablet (50 mg) by mouth once daily at bedtime.    PAST MEDICAL HISTORY : He  has a past  medical history of Arthritis, Asthma ([facility]), Cataract, Cerebral vascular accident (Multi) (05/09/2023), COVID-19 (11/15/2022), Esophageal obstruction (05/15/2022), Essential tremor (12/29/2022), GERD (gastroesophageal reflux disease), Hard of hearing, Hearing aid worn, Hyperlipidemia, Hypertension, Memory impairment, Olecranon bursitis, left elbow (04/26/2016), Personal history of diseases of the blood and blood-forming organs and certain disorders involving the immune mechanism (05/17/2022), Personal history of other diseases of the digestive system (11/13/2014), Personal history of other diseases of the digestive system (01/14/2021), Personal history of other specified conditions (11/29/2018), Rash and other nonspecific skin eruption (11/02/2021), Salmonella enteritis, Sleep apnea, TIA (transient ischemic attack), and Vertebral artery stenosis.    PAST SURGICAL HISTORY: He  has a past surgical history that includes Other surgical history (01/07/2015); Tonsillectomy (11/18/2014); Refractive surgery (03/05/2014); Other surgical history (03/05/2014); MR angio head wo IV contrast (05/09/2023); MR angio neck wo IV contrast (05/09/2023); CT angio head w and wo IV contrast (05/12/2023); CT angio neck (05/12/2023); Other surgical history; Back surgery; Cataract extraction; Total shoulder arthroplasty (Right); and Hernia repair.     FAMILY HISTORY: No changes since previous visit. Otherwise non-contributory as charted.     SOCIAL HISTORY  He  reports that he has never smoked. He has never used smokeless tobacco. He reports current alcohol use of about 4.0 standard drinks of alcohol per week. He reports that he does not currently use drugs after having used the following drugs: Marijuana.       PHYSICAL EXAM     VITAL SIGNS: There were no vitals taken for this visit.     PREVIOUS WEIGHTS:  Wt Readings from Last 3 Encounters:   04/12/24 87.5 kg (193 lb 0.2 oz)   04/03/24 86.6 kg (191 lb)   03/22/24 86.6 kg (191 lb)        General: Alert, oriented, conversant,.  HEENT: Lateral facial profile normal, nasal septum midline, turbinates nild, oropharynx is Mallampati class III. INSPIRE: tongue protrusion past the teeth observed on voltage 1.9  Heart: RRR, no murmur  Lungs: CTAB, no wheezing  Extremities: no peripheral edema  Neurologic: Language is normal and fluent, face symmetric, tongue protrusion midline, stance and gait normal.   Psychiatric: Affect appropriate, mood normal.       RESULTS/DATA     Bicarbonate (mmol/L)   Date Value   04/19/2024 23   04/12/2024 27   01/14/2024 26     Iron (ug/dL)   Date Value   05/19/2022 140   06/16/2020 102     Iron Saturation (%)   Date Value   05/19/2022 54 (H)   06/16/2020 41     TIBC (ug/dL)   Date Value   05/19/2022 257   06/16/2020 251     Ferritin (ug/L)   Date Value   08/21/2023 1,314 (H)   05/19/2022 1,073 (H)   08/17/2020 616 (H)       PAP Adherence  Not applicable    ASSESSMENT/PLAN     Mr. Rick is a 78 y.o. male and he returns in followup to the [facility] for WALKER.    # WALKER  - he had a HSAT that showed mod WALKER with 4% AHI around 25 and 3% AHI around 38.  - started on CPAP, diffuculty tolerating. Mask discomfort and trouble with leak  - Patient was implanted with HGNS therapy for WALKER on 1/8/2024 by Dr. Bob.  - activated in clinic 3/8/2024    Incoming settings: 0.8-1.8 (@1.0v)  FT: 1.9v  Outgoing setting 1.7-2.4 (@1.9v)  Start Delay: 30 min, Pause: 15 min, Therapy length: 9 hr      Disposition: Return to clinic in 2  months- at [facility].     Parker Watson  Sleep Fellow    Case Discussed with Attending, Dr. Lopez.

## 2024-04-26 ENCOUNTER — ANESTHESIA (OUTPATIENT)
Dept: OPERATING ROOM | Facility: HOSPITAL | Age: 79
End: 2024-04-26
Payer: MEDICARE

## 2024-04-26 ENCOUNTER — HOSPITAL ENCOUNTER (OUTPATIENT)
Facility: HOSPITAL | Age: 79
Setting detail: OUTPATIENT SURGERY
Discharge: HOME | End: 2024-04-26
Attending: UROLOGY | Admitting: UROLOGY
Payer: MEDICARE

## 2024-04-26 ENCOUNTER — ANESTHESIA EVENT (OUTPATIENT)
Dept: OPERATING ROOM | Facility: HOSPITAL | Age: 79
End: 2024-04-26
Payer: MEDICARE

## 2024-04-26 VITALS
SYSTOLIC BLOOD PRESSURE: 157 MMHG | RESPIRATION RATE: 14 BRPM | HEIGHT: 73 IN | TEMPERATURE: 97.3 F | HEART RATE: 50 BPM | WEIGHT: 187.83 LBS | DIASTOLIC BLOOD PRESSURE: 78 MMHG | BODY MASS INDEX: 24.89 KG/M2 | OXYGEN SATURATION: 93 %

## 2024-04-26 DIAGNOSIS — N13.8 BPH WITH OBSTRUCTION/LOWER URINARY TRACT SYMPTOMS: ICD-10-CM

## 2024-04-26 DIAGNOSIS — N40.1 BPH WITH OBSTRUCTION/LOWER URINARY TRACT SYMPTOMS: ICD-10-CM

## 2024-04-26 DIAGNOSIS — R35.0 URINARY FREQUENCY: Primary | ICD-10-CM

## 2024-04-26 PROCEDURE — 2500000005 HC RX 250 GENERAL PHARMACY W/O HCPCS: Performed by: NURSE ANESTHETIST, CERTIFIED REGISTERED

## 2024-04-26 PROCEDURE — A52648 PR LASER VAPORIZATION SURGERY PROSTATE, COMPLETE: Performed by: NURSE ANESTHETIST, CERTIFIED REGISTERED

## 2024-04-26 PROCEDURE — 7100000010 HC PHASE TWO TIME - EACH INCREMENTAL 1 MINUTE: Performed by: UROLOGY

## 2024-04-26 PROCEDURE — 2720000007 HC OR 272 NO HCPCS: Performed by: UROLOGY

## 2024-04-26 PROCEDURE — 99100 ANES PT EXTEME AGE<1 YR&>70: CPT | Performed by: ANESTHESIOLOGY

## 2024-04-26 PROCEDURE — 3600000010 HC OR TIME - EACH INCREMENTAL 1 MINUTE - PROCEDURE LEVEL FIVE: Performed by: UROLOGY

## 2024-04-26 PROCEDURE — A52648 PR LASER VAPORIZATION SURGERY PROSTATE, COMPLETE: Performed by: ANESTHESIOLOGY

## 2024-04-26 PROCEDURE — 2500000004 HC RX 250 GENERAL PHARMACY W/ HCPCS (ALT 636 FOR OP/ED): Mod: JZ | Performed by: UROLOGY

## 2024-04-26 PROCEDURE — 3700000001 HC GENERAL ANESTHESIA TIME - INITIAL BASE CHARGE: Performed by: UROLOGY

## 2024-04-26 PROCEDURE — 3700000002 HC GENERAL ANESTHESIA TIME - EACH INCREMENTAL 1 MINUTE: Performed by: UROLOGY

## 2024-04-26 PROCEDURE — 7100000009 HC PHASE TWO TIME - INITIAL BASE CHARGE: Performed by: UROLOGY

## 2024-04-26 PROCEDURE — 2500000004 HC RX 250 GENERAL PHARMACY W/ HCPCS (ALT 636 FOR OP/ED): Performed by: NURSE ANESTHETIST, CERTIFIED REGISTERED

## 2024-04-26 PROCEDURE — C1889 IMPLANT/INSERT DEVICE, NOC: HCPCS | Performed by: UROLOGY

## 2024-04-26 PROCEDURE — 7100000002 HC RECOVERY ROOM TIME - EACH INCREMENTAL 1 MINUTE: Performed by: UROLOGY

## 2024-04-26 PROCEDURE — 3600000005 HC OR TIME - INITIAL BASE CHARGE - PROCEDURE LEVEL FIVE: Performed by: UROLOGY

## 2024-04-26 PROCEDURE — 7100000001 HC RECOVERY ROOM TIME - INITIAL BASE CHARGE: Performed by: UROLOGY

## 2024-04-26 PROCEDURE — 2500000004 HC RX 250 GENERAL PHARMACY W/ HCPCS (ALT 636 FOR OP/ED): Performed by: UROLOGY

## 2024-04-26 PROCEDURE — 52648 LASER SURGERY OF PROSTATE: CPT | Performed by: UROLOGY

## 2024-04-26 RX ORDER — NORETHINDRONE AND ETHINYL ESTRADIOL 0.5-0.035
KIT ORAL AS NEEDED
Status: DISCONTINUED | OUTPATIENT
Start: 2024-04-26 | End: 2024-04-26

## 2024-04-26 RX ORDER — FENTANYL CITRATE 50 UG/ML
INJECTION, SOLUTION INTRAMUSCULAR; INTRAVENOUS AS NEEDED
Status: DISCONTINUED | OUTPATIENT
Start: 2024-04-26 | End: 2024-04-26

## 2024-04-26 RX ORDER — PROPOFOL 10 MG/ML
INJECTION, EMULSION INTRAVENOUS AS NEEDED
Status: DISCONTINUED | OUTPATIENT
Start: 2024-04-26 | End: 2024-04-26

## 2024-04-26 RX ORDER — PHENAZOPYRIDINE HYDROCHLORIDE 200 MG/1
200 TABLET, FILM COATED ORAL
Qty: 21 TABLET | Refills: 0 | Status: SHIPPED | OUTPATIENT
Start: 2024-04-26 | End: 2024-05-03

## 2024-04-26 RX ORDER — MIDAZOLAM HYDROCHLORIDE 1 MG/ML
INJECTION, SOLUTION INTRAMUSCULAR; INTRAVENOUS AS NEEDED
Status: DISCONTINUED | OUTPATIENT
Start: 2024-04-26 | End: 2024-04-26

## 2024-04-26 RX ORDER — SODIUM CHLORIDE, SODIUM LACTATE, POTASSIUM CHLORIDE, CALCIUM CHLORIDE 600; 310; 30; 20 MG/100ML; MG/100ML; MG/100ML; MG/100ML
100 INJECTION, SOLUTION INTRAVENOUS CONTINUOUS
Status: DISCONTINUED | OUTPATIENT
Start: 2024-04-26 | End: 2024-04-26 | Stop reason: HOSPADM

## 2024-04-26 RX ORDER — FENTANYL CITRATE 50 UG/ML
25 INJECTION, SOLUTION INTRAMUSCULAR; INTRAVENOUS EVERY 5 MIN PRN
Status: CANCELLED | OUTPATIENT
Start: 2024-04-26

## 2024-04-26 RX ORDER — LIDOCAINE HYDROCHLORIDE 10 MG/ML
0.1 INJECTION INFILTRATION; PERINEURAL ONCE
Status: CANCELLED | OUTPATIENT
Start: 2024-04-26 | End: 2024-04-26

## 2024-04-26 RX ORDER — SODIUM CHLORIDE, SODIUM LACTATE, POTASSIUM CHLORIDE, CALCIUM CHLORIDE 600; 310; 30; 20 MG/100ML; MG/100ML; MG/100ML; MG/100ML
100 INJECTION, SOLUTION INTRAVENOUS CONTINUOUS
Status: CANCELLED | OUTPATIENT
Start: 2024-04-26

## 2024-04-26 RX ORDER — CEFAZOLIN SODIUM 2 G/100ML
2 INJECTION, SOLUTION INTRAVENOUS ONCE
Status: COMPLETED | OUTPATIENT
Start: 2024-04-26 | End: 2024-04-26

## 2024-04-26 RX ORDER — ONDANSETRON HYDROCHLORIDE 2 MG/ML
INJECTION, SOLUTION INTRAVENOUS AS NEEDED
Status: DISCONTINUED | OUTPATIENT
Start: 2024-04-26 | End: 2024-04-26

## 2024-04-26 RX ORDER — CEPHALEXIN 500 MG/1
500 CAPSULE ORAL 2 TIMES DAILY
Qty: 14 CAPSULE | Refills: 0 | Status: SHIPPED | OUTPATIENT
Start: 2024-04-26 | End: 2024-05-03

## 2024-04-26 RX ORDER — FENTANYL CITRATE 50 UG/ML
50 INJECTION, SOLUTION INTRAMUSCULAR; INTRAVENOUS EVERY 5 MIN PRN
Status: CANCELLED | OUTPATIENT
Start: 2024-04-26

## 2024-04-26 RX ADMIN — SODIUM CHLORIDE, SODIUM LACTATE, POTASSIUM CHLORIDE, AND CALCIUM CHLORIDE 100 ML/HR: 600; 310; 30; 20 INJECTION, SOLUTION INTRAVENOUS at 09:37

## 2024-04-26 RX ADMIN — PROPOFOL 150 MG: 10 INJECTION, EMULSION INTRAVENOUS at 09:53

## 2024-04-26 RX ADMIN — CEFAZOLIN SODIUM 2 G: 2 INJECTION, SOLUTION INTRAVENOUS at 09:50

## 2024-04-26 RX ADMIN — ONDANSETRON 4 MG: 2 INJECTION INTRAMUSCULAR; INTRAVENOUS at 10:21

## 2024-04-26 RX ADMIN — PROPOFOL 50 MG: 10 INJECTION, EMULSION INTRAVENOUS at 10:05

## 2024-04-26 RX ADMIN — FENTANYL CITRATE 100 MCG: 50 INJECTION INTRAMUSCULAR; INTRAVENOUS at 09:51

## 2024-04-26 RX ADMIN — MIDAZOLAM 2 MG: 1 INJECTION INTRAMUSCULAR; INTRAVENOUS at 09:51

## 2024-04-26 RX ADMIN — EPHEDRINE SULFATE 15 MG: 50 INJECTION, SOLUTION INTRAVENOUS at 10:04

## 2024-04-26 RX ADMIN — EPHEDRINE SULFATE 10 MG: 50 INJECTION, SOLUTION INTRAVENOUS at 10:00

## 2024-04-26 SDOH — HEALTH STABILITY: MENTAL HEALTH: CURRENT SMOKER: 0

## 2024-04-26 ASSESSMENT — PAIN SCALES - GENERAL
PAINLEVEL_OUTOF10: 0 - NO PAIN
PAIN_LEVEL: 3
PAINLEVEL_OUTOF10: 0 - NO PAIN

## 2024-04-26 ASSESSMENT — PAIN - FUNCTIONAL ASSESSMENT
PAIN_FUNCTIONAL_ASSESSMENT: 0-10

## 2024-04-26 ASSESSMENT — COLUMBIA-SUICIDE SEVERITY RATING SCALE - C-SSRS
1. IN THE PAST MONTH, HAVE YOU WISHED YOU WERE DEAD OR WISHED YOU COULD GO TO SLEEP AND NOT WAKE UP?: NO
2. HAVE YOU ACTUALLY HAD ANY THOUGHTS OF KILLING YOURSELF?: NO

## 2024-04-26 NOTE — ANESTHESIA PREPROCEDURE EVALUATION
Patient: Hank Rick    Procedure Information       Date/Time: 04/26/24 1025    Procedure: Greenlight Photoselective Vaporization of Prostate    Location: [facility] / Virtual [facility]    Surgeons: Olga Alaniz MD            Visit Vitals  Smoking Status Never        Current Outpatient Medications   Medication Instructions    acetaminophen (TYLENOL 8 HOUR) 1,300 mg, oral, Daily PRN, Do not crush, chew, or split.    albuterol 90 mcg/actuation inhaler 2 puffs, inhalation, Every 4 to 6 hours as needed as directed    amLODIPine (Norvasc) 2.5 mg tablet Take  2 tablets every day , please note change in dose    aspirin 81 mg, oral, Daily, Please re start on 01/17/2024    atorvastatin (LIPITOR) 40 mg, oral, Daily    azelastine (Astelin) 137 mcg (0.1 %) nasal spray 2 sprays, Each Nostril, 2 times daily    diclofenac sodium (Voltaren) 1 % gel gel Topical, 2 times daily PRN    fexofenadine (Allegra) 180 mg tablet 1 tablet, oral, Daily    fluticasone (Flonase) 50 mcg/actuation nasal spray 1-2 sprays, Each Nostril, Daily    lidocaine 4 % patch APPLY 1 PATCH TOPICALLY TO AFFECTED AREA ONCE DAILY. LEAVE IN PLACE FOR 12 HOURS, THEN REMOVE AND KEEP OFF FOR 12 HOURS.    meloxicam (Mobic) 15 mg tablet take 1 tablet by mouth once daily if needed with food for MODERATE PAIN ( PAIN SCALE 4-6 )    memantine (NAMENDA) 5 mg, oral, 2 times daily    montelukast (SINGULAIR) 10 mg, oral, Daily    omeprazole OTC (PriLOSEC OTC) 20 mg EC tablet 1 tablet, oral, Daily    propranolol LA (INDERAL LA) 60 mg, oral, Daily    sildenafil (VIAGRA)  mg, oral, As needed    topiramate (Topamax) 25 mg tablet TAKE 1-2 TABLETS AT BEDTIME    traZODone (DESYREL) 50 mg, oral, Nightly        Allergies   Allergen Reactions    Gabapentin Anxiety    Pollen Extracts Other     Runny nose        Past Surgical History:   Procedure Laterality Date    BACK SURGERY      CATARACT EXTRACTION      CT ANGIO NECK  05/12/2023    CT NECK ANGIO W AND WO IV CONTRAST 5/12/2023 U  CT    CT HEAD ANGIO W AND WO IV CONTRAST  05/12/2023    CT HEAD ANGIO W AND WO IV CONTRAST 5/12/2023 U CT    HERNIA REPAIR      MR HEAD ANGIO WO IV CONTRAST  05/09/2023    MR HEAD ANGIO WO IV CONTRAST 5/9/2023 U MRI    MR NECK ANGIO WO IV CONTRAST  05/09/2023    MR NECK ANGIO WO IV CONTRAST 5/9/2023 U MRI    OTHER SURGICAL HISTORY  01/07/2015    Esophagogastric Fundoplasty Laparoscopic For Paraesophageal Hernia Repair    OTHER SURGICAL HISTORY  03/05/2014    Shoulder Surgery Left    OTHER SURGICAL HISTORY      REFRACTIVE SURGERY  03/05/2014    Corneal LASIK    TONSILLECTOMY  11/18/2014    Tonsillectomy    TOTAL SHOULDER ARTHROPLASTY Right     reverse        Relevant Problems   Cardiac   (+) Abnormal EKG   (+) Hyperlipidemia   (+) Primary hypertension   (+) Thoracic back pain      Pulmonary   (+) Asthma (HHS-HCC)   (+) WALKER (obstructive sleep apnea)      Neuro   (+) Cerebrovascular accident (CVA) (Multi)   (+) Cervical radiculitis   (+) Cubital tunnel syndrome, right   (+) Lumbar radiculopathy   (+) Mixed Alzheimer's and vascular dementia (Multi)      GI   (+) Dysphagia   (+) Dysphagia, idiopathic   (+) Esophageal stricture   (+) GERD (gastroesophageal reflux disease)      /Renal   (+) BPH with obstruction/lower urinary tract symptoms   (+) Benign non-nodular prostatic hyperplasia with lower urinary tract symptoms   (+) Hyponatremia      Hematology   (+) Anemia, unspecified      Musculoskeletal   (+) Chronic pain syndrome   (+) Degenerative lumbar spinal stenosis   (+) Lumbar canal stenosis   (+) Lumbar scoliosis   (+) Lumbar stenosis with neurogenic claudication   (+) Myofascial pain syndrome, cervical   (+) Primary osteoarthritis of both knees   (+) Primary osteoarthritis of left knee   (+) Primary osteoarthritis of right knee      HEENT   (+) [place] (hard of hearing)   (+) Seasonal allergies      ID   (+) Erythrasma      Skin   (+) Eczema   (+) Skin rash       Active Ambulatory Problems     Diagnosis Date  Noted    Chronic pain of left knee 05/18/2023    Primary osteoarthritis of left knee 05/18/2023    H/O dizziness 05/18/2023    Vertebrobasilar artery insufficiency 05/18/2023    Swelling of both ankles 07/20/2023    Vertebral artery stenosis/occlusion, left 08/31/2023    Urinary frequency 08/31/2023    Primary hypertension 08/31/2023    Hyponatremia 08/31/2023    Abnormal EKG 09/29/2023    Acne 09/29/2023    Allergic rhinitis 09/29/2023    Anemia, unspecified 09/29/2023    Asthma ([facility]) 09/29/2023    Atrophy of muscle of right shoulder 09/29/2023    Back pain with radiation 09/29/2023    Rodriguez's esophagus 09/29/2023    Benign non-nodular prostatic hyperplasia with lower urinary tract symptoms 09/29/2023    Benign prostatic hyperplasia (BPH) with urinary urgency 09/29/2023    Greater trochanteric bursitis, left 09/29/2023    Cervical spondylolysis 09/29/2023    Cervical radiculitis 09/29/2023    Chronic insomnia 09/29/2023    Cognitive changes 09/29/2023    Cognitive complaints with normal neuropsychological exam 09/29/2023    Cubital tunnel syndrome, right 09/29/2023    Daytime somnolence 09/29/2023    Degenerative lumbar spinal stenosis 09/29/2023    Lumbar radiculopathy 09/29/2023    Lumbar stenosis with neurogenic claudication 09/29/2023    Dysfunction of both eustachian tubes 09/29/2023    Dysphagia 09/29/2023    Eczema 09/29/2023    Elevated serum creatinine 09/29/2023    Erythrasma 09/29/2023    Essential tremor 09/29/2023    Tremor 05/09/2023    Facet arthropathy, cervical 09/29/2023    Myofascial pain syndrome, cervical 09/29/2023    Nodule of skin of neck 09/29/2023    Foot injury 09/29/2023    Gastritis 09/29/2023    GERD (gastroesophageal reflux disease) 09/29/2023    Hearing decreased 09/29/2023    Hoarseness of voice 09/29/2023    Hyperglycemia 09/29/2023    Hyperlipidemia 09/29/2023    Hypoalbuminemia 09/29/2023    GPC (giant papillary conjunctivitis) 01/06/2018    Impaired fasting glucose  09/29/2023    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 09/29/2023    Laceration of hand 09/29/2023    Lumbar scoliosis 09/29/2023    Male erectile disorder 09/29/2023    Memory loss 09/29/2023    Cerebrovascular accident (CVA) (Multi) 09/29/2023    Mixed Alzheimer's and vascular dementia (Multi) 09/29/2023    TIA (transient ischemic attack) 09/29/2023    Mass of neck 09/29/2023    Nocturia 09/29/2023    Olecranon bursitis of left elbow 09/29/2023    Other specified abnormal findings of blood chemistry 09/29/2023    Poor balance 09/29/2023    Primary osteoarthritis of both knees 09/29/2023    Prostatitis 09/29/2023    Rotator cuff arthropathy of right shoulder 09/29/2023    Sacroiliac joint dysfunction of both sides 09/29/2023    Dislocation of shoulder joint 09/29/2023    Serrated polyp of colon 09/29/2023    Shoulder injury, initial encounter 09/29/2023    Shoulder pain, right 09/29/2023    Status post LASIK surgery 01/06/2018    Syncope 09/29/2023    Thoracic back pain 09/29/2023    Unsteady gait 09/29/2023    Vocal cord weakness 09/29/2023    Esophageal stricture 09/29/2023    Bowing of true vocal cord 09/29/2023    Actinic keratosis 07/07/2017    Aphasia 09/29/2023    [place] (hard of hearing) 05/09/2023    Incipient cataract 01/06/2018    WALKER (obstructive sleep apnea) 05/09/2023    Bleeding from wound 09/29/2023    BMI 25.0-25.9,adult 09/29/2023    Cervicalgia 09/29/2023    Dog bite of finger 09/29/2023    Dog bite of right hand 09/29/2023    Dog bite, hand 09/29/2023    Dysphagia, idiopathic 09/29/2023    Elevated ferritin 09/29/2023    History of esophageal stricture 09/29/2023    Fall, accidental 09/29/2023    Finger abrasion 09/29/2023    History of hiatal hernia 09/29/2023    Increased urinary frequency 09/29/2023    Lumbar canal stenosis 09/29/2023    Lumbar strain 09/29/2023    Primary osteoarthritis of right knee 09/29/2023    Seasonal allergies 09/29/2023    Skin rash 09/29/2023     Other nonthrombocytopenic purpura (CMS-HCC) 11/08/2023    Urge incontinence of urine 11/17/2023    Chronic pain of both knees 03/21/2024    Chronic pain syndrome 03/21/2024    BPH with obstruction/lower urinary tract symptoms 04/03/2024     Resolved Ambulatory Problems     Diagnosis Date Noted    Insomnia 09/29/2023    Sleep apnea 09/29/2023    Obstructive sleep apnea 11/15/2023    BMI 24.0-24.9, adult 11/15/2023    Chest wall hematoma, right, initial encounter 01/13/2024     Past Medical History:   Diagnosis Date    Arthritis     Cataract     Cerebral vascular accident (Multi) 05/09/2023    COVID-19 11/15/2022    Esophageal obstruction 05/15/2022    Hard of hearing     Hearing aid worn     Hypertension     Memory impairment     Olecranon bursitis, left elbow 04/26/2016    Personal history of diseases of the blood and blood-forming organs and certain disorders involving the immune mechanism 05/17/2022    Personal history of other diseases of the digestive system 11/13/2014    Personal history of other diseases of the digestive system 01/14/2021    Personal history of other specified conditions 11/29/2018    Rash and other nonspecific skin eruption 11/02/2021    Salmonella enteritis     Vertebral artery stenosis        Clinical information reviewed:                   NPO Detail:    No data recorded     Physical Exam    Airway  Mallampati: II  TM distance: >3 FB  Neck ROM: full     Cardiovascular - normal exam     Dental - normal exam  Comments: Seven and eight crowns   Pulmonary - normal exam     Abdominal        Breathing feels fine today.     Anesthesia Plan    History of general anesthesia?: yes  History of complications of general anesthesia?: no    ASA 2     The patient is not a current smoker.    intravenous induction   Anesthetic plan and risks discussed with patient and spouse.    Plan discussed with CRNA.

## 2024-04-26 NOTE — DISCHARGE INSTRUCTIONS
Medication  Please take the medications as prescribed by your doctor. Finish the entire antibiotic prescription.  Take pain medication as directed by your doctor. Tylenol or non-steroidal anti-inflammatory medications (such as Aleve®) should relieve mild pain and discomfort. Take pyridium as needed for burning.  It will make your urine look orange.  Resume the usual medications you took before surgery unless instructed otherwise. Do not take blood thinners for one week or as directed by your physician.  Please take your antibiotics as prescribed.    Activity  Take it easy for the first 48 hours after the procedure. Do not drive or operate dangerous equipment for 48 hours following anesthesia.  You may be able to resume non-strenuous activities after 48 hours unless otherwise directed by your physician.  Avoid strenuous exercise, heavy lifting, bike riding, and yard work for two weeks, as the vibrations and movement may cause bleeding.  No sexual activity for two weeks after surgery.    Catheter  You should remove your catheter on Sunday 4/28/24. There are 20cc in the balloon keeping the catheter in your bladder. Once you remove the 20cc of fluid, the catheter can then be removed. Wash around the catheter with soap and water and rinse well.    Diet and Fluid  Avoid coffee, tea, carbonated beverages, alcoholic beverages, citrus juices, spicy foods and smoking for the first 3 days following surgery.  Increase your intake of fluids, particularly water. 24 - 48 ounces over your usual daily fluid intake is typically recommended. Limit fluids after 6 pm.    Bowels  Do not strain when having a bowel movement. Expect irregular bowel habits until fully recovered. Increase fiber in your diet. You may need a stool softener or laxative during the first two weeks of your recovery.    Expected Signs and Symptoms  You may experience urinary urgency and/or frequency for the first month following surgery. This is normal. Talk to your  doctor to discuss medications that may relieve this.  You may have a small amount of bleeding with urination on occasion. This may be accompanied with small blood clots. This is normal and should be relieved by increasing your fluid intake.  You may experience some mild burning and discomfort during urination. This is normal and should subside in one to two weeks.    When to Call Your Doctor  Please call the office immediately if any of the following symptoms appear:  Bright red bleeding in urine with a heavy blood clot.  Fever over 101 degrees F. (38 degrees C.)  Inability to urinate for more than 4 hours.  Feeling of bladder fullness that does not go away after urinating.  Severe pain at any time.  If you have any questions regarding your preparation for or recovery from the [facility] Procedure.

## 2024-04-26 NOTE — ANESTHESIA PROCEDURE NOTES
Airway  Date/Time: 4/26/2024 9:54 AM  Urgency: elective    Airway not difficult    Staffing  Performed: CRNA   Authorized by: Jeremy Castro MD    Performed by: RAFAEL Baugh-HONG  Patient location during procedure: OR    Indications and Patient Condition  Indications for airway management: anesthesia and airway protection  Spontaneous ventilation: present  Sedation level: deep  Preoxygenated: yes  Patient position: sniffing  MILS maintained throughout  Mask difficulty assessment: 1 - vent by mask    Final Airway Details  Final airway type: supraglottic airway      Successful airway: classic  Size 4     Number of attempts at approach: 1  Number of other approaches attempted: 0    Additional Comments  LMA lubricated. Atraumatic insertion.

## 2024-04-26 NOTE — OP NOTE
Greenlight Photoselective Vaporization of Prostate Operative Note     Date: 2024  OR Location: [facility]    Name: Hank Rick, : 1945, Age: 78 y.o., MRN: [id number], Sex: male    Diagnosis  Pre-op Diagnosis     * Urinary frequency [R35.0]     * BPH with obstruction/lower urinary tract symptoms [N40.1, N13.8] Post-op Diagnosis     * Urinary frequency [R35.0]     * BPH with obstruction/lower urinary tract symptoms [N40.1, N13.8]     Procedures  Greenlight Photoselective Vaporization of Prostate  07972 - [place] LASER VAPORIZATION OF PROSTATE FOR URINE FLOW      Surgeons      * Olga Alaniz - Primary    Resident/Fellow/Other Assistant:  Surgeons and Role:     * Jim Curiel MD - Resident - Assisting    Procedure Summary  Anesthesia: General  ASA: II  Anesthesia Staff: Anesthesiologist: Jreemy Castro MD  CRNA: RAFAEL Baugh-CRNA  Estimated Blood Loss: 5mL  Intra-op Medications: Administrations occurring from 1025 to 1150 on 24:  * No intraprocedure medications in log *           Anesthesia Record               Intraprocedure I/O Totals          Intake    lactated Ringer's infusion 20.00 mL    Total Intake 20 mL          Specimen: No specimens collected     Staff:   Circulator: Breanna Rojas RN  Scrub Person: Lilly Slater         Drains and/or Catheters:   Urethral Catheter Coude 22 Fr. (Active)       Tourniquet Times:         Implants:     Findings: Significant residual adenoma anteriorly and by Veru    Indications: Hank Rick is an 78 y.o. male who is having surgery for Urinary frequency [R35.0]  BPH with obstruction/lower urinary tract symptoms [N40.1, N13.8].     The patient was seen in the preoperative area. The risks, benefits, complications, treatment options, non-operative alternatives, expected recovery and outcomes were discussed with the patient. The possibilities of reaction to medication, pulmonary aspiration, injury to surrounding structures, bleeding, recurrent  infection, the need for additional procedures, failure to diagnose a condition, and creating a complication requiring transfusion or operation were discussed with the patient. The patient concurred with the proposed plan, giving informed consent.  The site of surgery was properly noted/marked if necessary per policy. The patient has been actively warmed in preoperative area. Preoperative antibiotics have been ordered and given within 1 hours of incision. Venous thrombosis prophylaxis have been ordered including bilateral sequential compression devices    Procedure Details: After obtaining informed consent, the patient was brought to the operating room, placed on the operating table in a supine position. Preoperative time-out was performed.  Preoperative urine culture was reviewed.  The patient received antibiotic intraoperatively for operative prophylaxis. Pt underwent general anesthesia without complication.  The patient was repositioned into the dorsal lithotomy position and prepped and draped in the usual sterile fashion.  A 22-Guamanian continuous-flow laser cystoscope was inserted into the  bladder via the urethra atraumatically using a visual obturator, pan cystoscopy was performed.  There were no abnormal tumors or lesions noted in the bladder.  Bladder was heavily trabeculated.  Patient had significant defect due to previous enucleation procedure.  However there was significant amount of residual adenoma that remained anteriorly.  Bilateral ureteral orifices were identified in the normal orthotopic position.  The scope was then replaced into the prostatic fossa.   A 180 watt GreenLight laser was then inserted.  We then proceeded to vaporize the prostatic tissue.  Obstructing anterior tissue and lateral tissue by Veru was vaporized successfully.  Care was taken at our proximal landmark to visualize the ureteral orifices at all times so that they would be excluded from our vaporization.    At the conclusion of  the procedure, there was a large TUR-like defect urinary channel that was wide open.  The ureteral orifices could be identified orthotopically, again outside of our vaporization and unharmed.  The external sphincter was also noted to be intact.  At the conclusion of the procedure, again the patient's bladder was emptied.  The cystoscope was removed from the bladder, and a single-lumen catheter was then placed and 20 cc of sterile water were placed in the balloon with return of clear pink-tinged urine.  The patient was then awoken from general anesthesia, taken out of the lithotomy, and transferred to the PACU in stable condition.    Complications:  None; patient tolerated the procedure well.    Disposition: PACU - hemodynamically stable.  Condition: stable         Additional Details:     Attending Attestation: I was present and scrubbed for the entire procedure.    Olga Alaniz  Phone Number: [phone]

## 2024-04-26 NOTE — ANESTHESIA POSTPROCEDURE EVALUATION
Patient: Hank Rick    Procedure Summary       Date: 04/26/24 Room / Location: [facility] 02 / [facility]    Anesthesia Start: 0949 Anesthesia Stop: 1037    Procedure: Greenlight Photoselective Vaporization of Prostate Diagnosis:       Urinary frequency      BPH with obstruction/lower urinary tract symptoms      (Urinary frequency [R35.0])      (BPH with obstruction/lower urinary tract symptoms [N40.1, N13.8])    Surgeons: Olga Alaniz MD Responsible Provider: Jeremy Castro MD    Anesthesia Type: general ASA Status: 2            Anesthesia Type: general    Vitals Value Taken Time   /61 04/26/24 1102   Temp 36.3 °C (97.3 °F) 04/26/24 1102   Pulse 48 04/26/24 1102   Resp 16 04/26/24 1102   SpO2 92 % 04/26/24 1102       Anesthesia Post Evaluation    Patient location during evaluation: bedside  Patient participation: complete - patient participated  Level of consciousness: awake  Pain score: 3  Pain management: adequate  Multimodal analgesia pain management approach  Airway patency: patent  Two or more strategies used to mitigate risk of obstructive sleep apnea  Cardiovascular status: acceptable  Respiratory status: acceptable  Hydration status: acceptable  Postoperative Nausea and Vomiting: none        No notable events documented.

## 2024-04-29 ENCOUNTER — OFFICE VISIT (OUTPATIENT)
Dept: PRIMARY CARE | Facility: CLINIC | Age: 79
End: 2024-04-29
Payer: MEDICARE

## 2024-04-29 VITALS
WEIGHT: 191.8 LBS | BODY MASS INDEX: 25.3 KG/M2 | OXYGEN SATURATION: 96 % | HEART RATE: 62 BPM | SYSTOLIC BLOOD PRESSURE: 131 MMHG | DIASTOLIC BLOOD PRESSURE: 78 MMHG

## 2024-04-29 DIAGNOSIS — M17.0 PRIMARY OSTEOARTHRITIS OF BOTH KNEES: ICD-10-CM

## 2024-04-29 DIAGNOSIS — I10 HYPERTENSION, UNSPECIFIED TYPE: ICD-10-CM

## 2024-04-29 DIAGNOSIS — M25.561 CHRONIC PAIN OF BOTH KNEES: Primary | Chronic | ICD-10-CM

## 2024-04-29 DIAGNOSIS — M25.562 CHRONIC PAIN OF BOTH KNEES: Primary | Chronic | ICD-10-CM

## 2024-04-29 DIAGNOSIS — G89.29 CHRONIC PAIN OF BOTH KNEES: Primary | Chronic | ICD-10-CM

## 2024-04-29 PROCEDURE — 20610 DRAIN/INJ JOINT/BURSA W/O US: CPT | Performed by: INTERNAL MEDICINE

## 2024-04-29 PROCEDURE — 99215 OFFICE O/P EST HI 40 MIN: CPT | Performed by: INTERNAL MEDICINE

## 2024-04-29 PROCEDURE — 3078F DIAST BP <80 MM HG: CPT | Performed by: INTERNAL MEDICINE

## 2024-04-29 PROCEDURE — 1160F RVW MEDS BY RX/DR IN RCRD: CPT | Performed by: INTERNAL MEDICINE

## 2024-04-29 PROCEDURE — 1159F MED LIST DOCD IN RCRD: CPT | Performed by: INTERNAL MEDICINE

## 2024-04-29 PROCEDURE — 1036F TOBACCO NON-USER: CPT | Performed by: INTERNAL MEDICINE

## 2024-04-29 PROCEDURE — 3075F SYST BP GE 130 - 139MM HG: CPT | Performed by: INTERNAL MEDICINE

## 2024-04-29 RX ORDER — TRIAMCINOLONE ACETONIDE 40 MG/ML
40 INJECTION, SUSPENSION INTRA-ARTICULAR; INTRAMUSCULAR ONCE
Status: COMPLETED | OUTPATIENT
Start: 2024-04-29 | End: 2024-04-29

## 2024-04-29 RX ORDER — OXYCODONE AND ACETAMINOPHEN 5; 325 MG/1; MG/1
TABLET ORAL
COMMUNITY
End: 2024-05-01 | Stop reason: SDUPTHER

## 2024-04-29 RX ADMIN — TRIAMCINOLONE ACETONIDE 40 MG: 40 INJECTION, SUSPENSION INTRA-ARTICULAR; INTRAMUSCULAR at 14:04

## 2024-04-29 RX ADMIN — TRIAMCINOLONE ACETONIDE 40 MG: 40 INJECTION, SUSPENSION INTRA-ARTICULAR; INTRAMUSCULAR at 14:03

## 2024-04-29 ASSESSMENT — ENCOUNTER SYMPTOMS: CONSTITUTIONAL NEGATIVE: 1

## 2024-04-29 NOTE — PROGRESS NOTES
Patient ID: Hank Rick is a 78 y.o. male who presents for Knee Pain (Requesting shots in both knees), lab results, Dizziness (Not dizzy now, but felt dizzy after a procedure on Friday), and Med Refill.    /78   Pulse 62   Wt 87 kg (191 lb 12.8 oz)   SpO2 96%   BMI 25.30 kg/m²     HPI      PT HERE FOR CORTISONE INJECTION   IN BOTH KNEES     HE HAS SEVERE OA BOTH KNEES     HE HAD LAST CORTISONE INJECTION ON 11/2023   CORTISONE INJECTIONS HELPED     Subjective     Review of Systems   Constitutional: Negative.    Musculoskeletal:         BILATERAL KNEE PAIN   PAIN SCALE 5-7/10  HURTS TO WALK   NO BUCKLING OF THE AFFECTED KNEE  NO LOCKING SENSATION IN EITHER KNEES   NO SWELLING OF THESE AFFECTED KNEES    All other systems reviewed and are negative.      Objective     Physical Exam  Vitals and nursing note reviewed.   Musculoskeletal:         General: No swelling, tenderness or deformity.      Comments: BOTH KNEES FLEXION/EXTENSION PAINFUL   COARSE CREPITATIONS NOTED   NO ERYTHEMA , NO WARTHM          Lab Results   Component Value Date    WBC 6.9 04/19/2024    HGB 12.4 (L) 04/19/2024    HCT 39.2 (L) 04/19/2024    MCV 97 04/19/2024     04/19/2024           Problem List Items Addressed This Visit       Primary osteoarthritis of both knees    Chronic pain of both knees - Primary           A/P         PT AGREED WITH CORTISONE SHOT BOTH KNEES           Patient ID: Hank Rick is a 78 y.o. male.    Joint Injection Large/Arthrocentesis: bilateral knee on 4/29/2024 1:55 PM  Indications: pain (BOTH KNEES )  Details: 22 G needle, anterolateral (BOTH KNEES ) approach (guidance: UNDER CLINICAL GUIDANCE )  Outcome: tolerated well, no immediate complications    With patient's consent, and after sterilizing the skin of both knees which included right and left knee, with alcohol swab x 4 adequately  And spraying with topical anesthetic to numb the skin of both knees, injection Kenalog 40 mg + 1 cc 1%  lidocaine  Injected into each knees, patient tolerated the procedure very well, no complications noted, pain is immediately relieved

## 2024-05-01 ENCOUNTER — TELEPHONE (OUTPATIENT)
Dept: PRIMARY CARE | Facility: CLINIC | Age: 79
End: 2024-05-01
Payer: MEDICARE

## 2024-05-01 DIAGNOSIS — G89.29 CHRONIC PAIN OF BOTH KNEES: Primary | ICD-10-CM

## 2024-05-01 DIAGNOSIS — M25.562 CHRONIC PAIN OF BOTH KNEES: Primary | ICD-10-CM

## 2024-05-01 DIAGNOSIS — M25.561 CHRONIC PAIN OF BOTH KNEES: Primary | ICD-10-CM

## 2024-05-01 RX ORDER — AMLODIPINE BESYLATE 2.5 MG/1
5 TABLET ORAL DAILY
Qty: 180 TABLET | Refills: 2 | Status: SHIPPED | OUTPATIENT
Start: 2024-05-01

## 2024-05-01 RX ORDER — OXYCODONE AND ACETAMINOPHEN 5; 325 MG/1; MG/1
1 TABLET ORAL EVERY 8 HOURS PRN
Qty: 15 TABLET | Refills: 0 | Status: SHIPPED | OUTPATIENT
Start: 2024-05-01 | End: 2024-05-06

## 2024-05-01 NOTE — TELEPHONE ENCOUNTER
Pt. Was seen yesterday. States thought you were going to refill his Oxycodone. Can you please refill.

## 2024-05-10 ENCOUNTER — APPOINTMENT (OUTPATIENT)
Dept: SLEEP MEDICINE | Facility: CLINIC | Age: 79
End: 2024-05-10
Payer: MEDICARE

## 2024-05-23 DIAGNOSIS — N13.8 BPH WITH OBSTRUCTION/LOWER URINARY TRACT SYMPTOMS: Primary | ICD-10-CM

## 2024-05-23 DIAGNOSIS — N40.1 BPH WITH OBSTRUCTION/LOWER URINARY TRACT SYMPTOMS: Primary | ICD-10-CM

## 2024-05-28 ENCOUNTER — LAB (OUTPATIENT)
Dept: LAB | Facility: LAB | Age: 79
End: 2024-05-28
Payer: MEDICARE

## 2024-05-28 DIAGNOSIS — N13.8 BPH WITH OBSTRUCTION/LOWER URINARY TRACT SYMPTOMS: ICD-10-CM

## 2024-05-28 DIAGNOSIS — N40.1 BPH WITH OBSTRUCTION/LOWER URINARY TRACT SYMPTOMS: ICD-10-CM

## 2024-05-28 PROCEDURE — 87086 URINE CULTURE/COLONY COUNT: CPT

## 2024-05-29 LAB — BACTERIA UR CULT: NORMAL

## 2024-06-04 ENCOUNTER — OFFICE VISIT (OUTPATIENT)
Dept: UROLOGY | Facility: CLINIC | Age: 79
End: 2024-06-04
Payer: MEDICARE

## 2024-06-04 VITALS
TEMPERATURE: 97.4 F | WEIGHT: 187 LBS | SYSTOLIC BLOOD PRESSURE: 127 MMHG | DIASTOLIC BLOOD PRESSURE: 77 MMHG | BODY MASS INDEX: 24.78 KG/M2 | HEIGHT: 73 IN

## 2024-06-04 DIAGNOSIS — R35.0 URINARY FREQUENCY: Primary | ICD-10-CM

## 2024-06-04 LAB
POC APPEARANCE, URINE: CLEAR
POC BILIRUBIN, URINE: NEGATIVE
POC BLOOD, URINE: ABNORMAL
POC COLOR, URINE: ABNORMAL
POC GLUCOSE, URINE: NEGATIVE MG/DL
POC KETONES, URINE: NEGATIVE MG/DL
POC LEUKOCYTES, URINE: ABNORMAL
POC NITRITE,URINE: NEGATIVE
POC PH, URINE: 7 PH
POC PROTEIN, URINE: NEGATIVE MG/DL
POC SPECIFIC GRAVITY, URINE: 1.01
POC UROBILINOGEN, URINE: 0.2 EU/DL

## 2024-06-04 PROCEDURE — 81002 URINALYSIS NONAUTO W/O SCOPE: CPT | Performed by: PHYSICIAN ASSISTANT

## 2024-06-04 PROCEDURE — 3074F SYST BP LT 130 MM HG: CPT | Performed by: PHYSICIAN ASSISTANT

## 2024-06-04 PROCEDURE — 99024 POSTOP FOLLOW-UP VISIT: CPT | Performed by: PHYSICIAN ASSISTANT

## 2024-06-04 PROCEDURE — 3078F DIAST BP <80 MM HG: CPT | Performed by: PHYSICIAN ASSISTANT

## 2024-06-04 RX ORDER — TAMSULOSIN HYDROCHLORIDE 0.4 MG/1
0.4 CAPSULE ORAL DAILY
Qty: 30 CAPSULE | Refills: 11 | Status: SHIPPED | OUTPATIENT
Start: 2024-06-04 | End: 2025-06-04

## 2024-06-04 ASSESSMENT — ENCOUNTER SYMPTOMS
CONSTITUTIONAL NEGATIVE: 1
PSYCHIATRIC NEGATIVE: 1
ALLERGIC/IMMUNOLOGIC NEGATIVE: 1
EYES NEGATIVE: 1
CARDIOVASCULAR NEGATIVE: 1
RESPIRATORY NEGATIVE: 1
MUSCULOSKELETAL NEGATIVE: 1
HEMATOLOGIC/LYMPHATIC NEGATIVE: 1
FREQUENCY: 1
NEUROLOGICAL NEGATIVE: 1
ENDOCRINE NEGATIVE: 1
GASTROINTESTINAL NEGATIVE: 1

## 2024-06-04 NOTE — PROGRESS NOTES
Subjective   Patient ID: Hank Rick is a 78 y.o. male who presents for No chief complaint on file..  HPI  Patient is a 77 yo male with nocturia managed well with cpap, BPH w/ LUTS, nocturia s/p HoLEP with Dr. Ohara 4/7/22, persistent nocturia tried and failed Oxybutynine, vesicare, trospium, toviaz, detrol and myrbetriq recently had a PVP 4/26/24 presents sooner than scheduled due to worsening symptoms    Patient reports worsening nocturia since . Pt He reports in the past it was 3-4 times a night, now it is 4-5 times a night.  He denies any urinary symptoms during the day. He denies urinary frequency or urgency. He denies slow stream ir straining with urination.   Patient has been having ongoing Nocturia for many years. He reports Botox helped fir a short period of time.     UA shows trace of blood and small leuks. Negative for nitrates.       Lab Results   Component Value Date    BUN 30 (H) 04/19/2024    CREATININE 1.19 04/19/2024    EGFR 63 04/19/2024     (L) 04/19/2024    K 4.0 04/19/2024     04/19/2024    CO2 23 04/19/2024    OSMOLALITY 259 (L) 08/21/2023    CALCIUM 8.9 04/19/2024      Lab Results   Component Value Date    WBC 6.9 04/19/2024    RBC 4.06 (L) 04/19/2024    HGB 12.4 (L) 04/19/2024    HCT 39.2 (L) 04/19/2024    MCV 97 04/19/2024    MCH 30.5 04/19/2024    MCHC 31.6 (L) 04/19/2024    RDW 12.2 04/19/2024     04/19/2024    MPV 10.1 05/09/2023      Recent urine culture negative.  Review of Systems   Constitutional: Negative.    HENT: Negative.     Eyes: Negative.    Respiratory: Negative.     Cardiovascular: Negative.    Gastrointestinal: Negative.    Endocrine: Negative.    Genitourinary:  Positive for frequency and urgency.   Musculoskeletal: Negative.    Skin: Negative.    Allergic/Immunologic: Negative.    Neurological: Negative.    Hematological: Negative.    Psychiatric/Behavioral: Negative.         Objective   Physical Exam  Constitutional:       General: He is not in  acute distress.     Appearance: Normal appearance.   HENT:      Head: Normocephalic and atraumatic.      Nose: Nose normal.      Mouth/Throat:      Mouth: Mucous membranes are moist.   Cardiovascular:      Rate and Rhythm: Normal rate.   Pulmonary:      Effort: Pulmonary effort is normal.   Abdominal:      General: Abdomen is flat.      Palpations: Abdomen is soft.   Musculoskeletal:      Cervical back: Normal range of motion.   Neurological:      Mental Status: He is alert.       Assessment/Plan     Nocturia  I discussed his symptoms most like worsened due to inflammation from recent PVP/   I discussed nocturia has been an ongoing issue for many years. Patient has tried and failed multiple medications  Patient reports sleep apnea is well controlled with cpap.   Patient is not a good candidate of DDAVP due to hx of low sodium.  I discussed trial of flomax since he is not emptying is bladder  Follow up in 1 months or sooner as needed         Kevyn Toney PA-C 06/04/24 9:31 AM

## 2024-06-19 DIAGNOSIS — E78.5 HYPERLIPIDEMIA, UNSPECIFIED HYPERLIPIDEMIA TYPE: ICD-10-CM

## 2024-06-19 DIAGNOSIS — Z86.79: ICD-10-CM

## 2024-06-19 DIAGNOSIS — G45.9 TIA (TRANSIENT ISCHEMIC ATTACK): ICD-10-CM

## 2024-06-20 RX ORDER — ATORVASTATIN CALCIUM 40 MG/1
40 TABLET, FILM COATED ORAL DAILY
Qty: 90 TABLET | Refills: 1 | Status: SHIPPED | OUTPATIENT
Start: 2024-06-20

## 2024-06-20 RX ORDER — CLOPIDOGREL BISULFATE 75 MG/1
75 TABLET ORAL DAILY
Qty: 90 TABLET | Refills: 1 | Status: SHIPPED | OUTPATIENT
Start: 2024-06-20

## 2024-06-24 DIAGNOSIS — G25.0 ESSENTIAL TREMOR: ICD-10-CM

## 2024-06-25 ENCOUNTER — OFFICE VISIT (OUTPATIENT)
Dept: NEUROLOGY | Facility: CLINIC | Age: 79
End: 2024-06-25
Payer: MEDICARE

## 2024-06-25 VITALS
DIASTOLIC BLOOD PRESSURE: 71 MMHG | SYSTOLIC BLOOD PRESSURE: 110 MMHG | BODY MASS INDEX: 24.91 KG/M2 | TEMPERATURE: 97.8 F | HEART RATE: 59 BPM | RESPIRATION RATE: 18 BRPM | WEIGHT: 188.8 LBS

## 2024-06-25 DIAGNOSIS — G31.84 MILD COGNITIVE IMPAIRMENT: Primary | ICD-10-CM

## 2024-06-25 DIAGNOSIS — F02.80 ALZHEIMER DISEASE (MULTI): ICD-10-CM

## 2024-06-25 DIAGNOSIS — G31.84 MCI (MILD COGNITIVE IMPAIRMENT): ICD-10-CM

## 2024-06-25 DIAGNOSIS — G30.9 ALZHEIMER DISEASE (MULTI): ICD-10-CM

## 2024-06-25 PROCEDURE — 1036F TOBACCO NON-USER: CPT | Performed by: PSYCHIATRY & NEUROLOGY

## 2024-06-25 PROCEDURE — 99215 OFFICE O/P EST HI 40 MIN: CPT | Performed by: PSYCHIATRY & NEUROLOGY

## 2024-06-25 PROCEDURE — 3074F SYST BP LT 130 MM HG: CPT | Performed by: PSYCHIATRY & NEUROLOGY

## 2024-06-25 PROCEDURE — 1126F AMNT PAIN NOTED NONE PRSNT: CPT | Performed by: PSYCHIATRY & NEUROLOGY

## 2024-06-25 PROCEDURE — 3078F DIAST BP <80 MM HG: CPT | Performed by: PSYCHIATRY & NEUROLOGY

## 2024-06-25 RX ORDER — MEMANTINE HYDROCHLORIDE 10 MG/1
10 TABLET ORAL 2 TIMES DAILY
Qty: 240 TABLET | Refills: 2 | Status: SHIPPED | OUTPATIENT
Start: 2024-06-25 | End: 2025-06-25

## 2024-06-25 ASSESSMENT — ENCOUNTER SYMPTOMS
OCCASIONAL FEELINGS OF UNSTEADINESS: 1
LOSS OF SENSATION IN FEET: 0
DEPRESSION: 0

## 2024-06-25 ASSESSMENT — PAIN SCALES - GENERAL: PAINLEVEL: 0-NO PAIN

## 2024-06-25 NOTE — PROGRESS NOTES
[place], [place]      Department of Neurology  [facility]    F/U Consultation    PCP: Dr. Sonu Larose        2024  Re: Hank Rick   : 1945           MRN [id number]    CC: 77yo man in eval of memory loss.  Info from pt, wife (ISAMAR Smith), and EMR.  HPI: : Covid 2022 then memory diffic uyen names.  ET worse in Rt hand, better now. :  No losing things, or way-finding diffic, but feels “in a fog,” w/ dizzy “unsteadiness” uyen in shower since Covid; no falls, more dozing.  :  More “upbeat,” less memory diffic.  Wife sees less “out-of-it” off NTN.  In driving he is less focused, no accidents.  : Recovers from Rt shoulder surgery, memory better, wife not convinced but agrees word-finding better.  2023:  April L4-5-6 surg spacer insertx2 but pain recurs after lifting.  Last Tu disoriented driving home, got lost.  At home wife asked, pt respd, “I don't think I have any…then staring and slurring.”  Dxd TIA, “no damage,” d/cd 2d later on HTN Rx.  Next day dizzy in shower, wife found unconscious on floor, called [facility], d/cd home on antiHTN.  Pt lifted too much, exac LBP, shuffling gait (can't lift feet).  Driving too slow/fast, unsteady shuffling.  Rxd ASA after 2nd Chandrika stroke alert, doing well at home.  :  Fell in bedrm hosp dx Covid, w/o head strike, that week “talking gibberish,” legs heavy.  : Light-headed w/ initial WU=769/70, then 120/70, notices a little bit, an occasional event, had work-out this AM.  He tells me of mildly unsteady and lightheaded is usual for him.  :  Looks well, rises and walks w/o diffic, does pilates and enjoys it.  He thinks his memory is good, the memantine has helped. Not probs w/ memantine.  Sleep cxd by 4-5 toBR overnight, 1st procedure.  Drives.  Med Hx: NKAD; intol NTN (weird dreams)  Rx: plavix=75, ecASA81,     [id number],        [id number],     [id number],   CPAP,      [id number]+10tid,  [id number],  trxngmpco95,  diclofenac gel,   flonase pn, astelin pn,         iyqurlmb84,        [id number]   H/O: Depress, EssTrm, WALKER, EsophStrict, GERD, BPH, salmo enteritis, h hernia, rash  S/P:  LumbarSpacer (2023), corneal Lasik,  RotCuff,   tonsillx,  EGFD  FHx: Dad colon CA; Mom MS, killed by truck,  2 bro, 1 estranged,  2 kids (1 step, 2 gkids)  PSHx: [employer], marriedx3, ucuvinb68d; retired, prison, OOBtoBRx2, still drives  ROS: Skin-Skel Rt Lstenosis Cardio-pulm seasonal wheeze,GI Barretts   Neuro:  RtRadic   Exam: XT=994/71, HR=59, RR=18, ju=808.8 lb   Lungs w/ clear, RRR w/o MRG, Nl carotids, Abdo+BS;  +1 ankle edema   Neuro: MS: calm/engaging  not anxious or sad   CN:  FEOM H&V w/o ptosis, nl saccs/ptosis,  intact pursuit, face symm    Motor: strong & steady  mild nl tone   w/o UE drift  or invol mvmts      Sens:  -Romberg, symm sway <1cm, misbalance      Coordin: FT fast w/ reg pace bilat        Gait: nl pace, 1 step turn, unstable tandem, little symm armswing   Cognitive: RH  Language: wnl comprehension, spont stmts wnl,   3-5 words phrases, some full sentences    w/o paraphasias   Prev:  MoCA = 27/30 c/w not impaired  GDS: 0/15 c/w no mood disorder  Labs (to 4-):  wbc=6.5, hct=39.6, uya=165  qac=349, bun/crt=30/1.19, GFR=69, AST=16, ALT=17, ALkP=67, fT4=1.3 ,TSH=0.99     Rhbo=440, HDL=39.7, Chol/hdl=3.8 LDL=83 vldl=29, sq=025  MMA=90 Akbaaj99=XH   jaza=991,  PT=9.7, INR=1.0   EKG (23-Aug-2023): Sinus Inocente=48, AGwH=649     (1-): Sinus Inocente=47, FGnN=038  LVH wideQRS r/o septal infarct  Cspine MRI (8-): 1. 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 Hyperintens C5-C7, ? injury/venous/bursitis       MMA=90 Kfainf74=DX   oinr=366,  INR=1.0   Neuropsych Eval (14-Sep-2022): …isolated mild weakness of attn/exec func.  Other exec func and memory, lang, vis spat, vis construc and mood well preserved.  Brain MRI (27-Jun-2022): 1. No infarct. 2) mod brain vol  loss. 3. Scattered, patchy, & confluent WM changes bilat and brainstem, likely sml vsl changes. 4. Punctate T2 bright in subinsular, BGs, int caps, thalami c/w prom perivasc spaces or small remote lacunes.  Brain MRI/A (9-May-2023) MRI: No acute changes.  GRE hypointense signal Lt>Rt GP extends to bilat med cerebellar peduncles w/ mild Lt encephalomal (degen vs old hem). Mod genl parenchy vol loss & WM change.  MRA: Redemo small Lt vert tapers intracranially.   Brain MRI/A (23-Aug-2023): 1. No acute abnl or mass.  2. Stable chronic findings including prob chronic sml vsl changes in white matter infarcts, overall marked in extent.  Sleep Study (12-May-2023): WALKER on CPAP not well-tolerated, considering Inspire  A & P:  Post-Covid memory diffic, uyen names.  S/P lumbar vert spacer cxd by over-exertion and worse then two episodes of acute neuro change dxd TIAs.  Genl exam unremarkable.  Neuro exam w/ gait instab and variable symm hand tremor.  Cognitive exam w/ intact language despite TIA related dysphasias.  Prev neuropsych w/ exec dysfunc.  Labs w/ wbc=3.9à8.8 mildly irregular sleep.  MRI (June2022) w/ genl gyral atrophy, bilat ant periventric WM changes.  MRI (May2023) old bilat GPàpeduncles; MRA dimunitive Lt vert tapering intracranially.      Covid x2 “hit,” ?antiHTN drop BP? in car & hot shower.  Told no new lesions.  Chronic LBP dominated the recurrent stroke alerts w/ MR evid of bilat GPàmidPeduncle old lesions, tight Lt vert raises sustained concern Re a cerebrovasculare etiology.  Otherwise DDx is PD?ET and I will request a MAURICIO scan to address that.  His chr depress might be exacerbated, but he tells me his memory/cogn is doing well, and I share that impression.  Regardless of how we got here, we are stopping the Inderal tid, staying w/ prev single dose LA.  I will wait for MAURICIO scan but I remain concerned that this is not ET, at least not exclusively.  My interpretation remains that his Lt Vert insuffic may  account for episodes speech arrest by direct feeding of deep Lt posterior thalamic or cortical perfusion possibly related to bradycardia.  Plavix might mitigate such effects but one must consider the potential utility of a pacemaker and I will refer him for Holter and possible cardiac pacermaker.  I will f/u in 3 months.   Thank you for allowing me to participate in the care of your patient.   Sincerely yours, Darek Sánchez M.D., Ph.D., Professor of Neurology

## 2024-06-26 RX ORDER — PROPRANOLOL HYDROCHLORIDE 60 MG/1
60 CAPSULE, EXTENDED RELEASE ORAL DAILY
Qty: 90 CAPSULE | Refills: 2 | Status: SHIPPED | OUTPATIENT
Start: 2024-06-26

## 2024-06-28 ENCOUNTER — APPOINTMENT (OUTPATIENT)
Dept: SLEEP MEDICINE | Facility: CLINIC | Age: 79
End: 2024-06-28
Payer: MEDICARE

## 2024-06-28 VITALS
SYSTOLIC BLOOD PRESSURE: 126 MMHG | HEART RATE: 58 BPM | OXYGEN SATURATION: 92 % | HEIGHT: 73 IN | DIASTOLIC BLOOD PRESSURE: 75 MMHG | TEMPERATURE: 97.6 F | BODY MASS INDEX: 24.89 KG/M2 | WEIGHT: 187.8 LBS

## 2024-06-28 DIAGNOSIS — G47.33 OSA (OBSTRUCTIVE SLEEP APNEA): ICD-10-CM

## 2024-06-28 DIAGNOSIS — J30.9 ALLERGIC RHINITIS, UNSPECIFIED SEASONALITY, UNSPECIFIED TRIGGER: Primary | ICD-10-CM

## 2024-06-28 PROCEDURE — 1036F TOBACCO NON-USER: CPT | Performed by: STUDENT IN AN ORGANIZED HEALTH CARE EDUCATION/TRAINING PROGRAM

## 2024-06-28 PROCEDURE — 1160F RVW MEDS BY RX/DR IN RCRD: CPT | Performed by: STUDENT IN AN ORGANIZED HEALTH CARE EDUCATION/TRAINING PROGRAM

## 2024-06-28 PROCEDURE — G2211 COMPLEX E/M VISIT ADD ON: HCPCS | Performed by: STUDENT IN AN ORGANIZED HEALTH CARE EDUCATION/TRAINING PROGRAM

## 2024-06-28 PROCEDURE — 99214 OFFICE O/P EST MOD 30 MIN: CPT | Performed by: STUDENT IN AN ORGANIZED HEALTH CARE EDUCATION/TRAINING PROGRAM

## 2024-06-28 PROCEDURE — 1159F MED LIST DOCD IN RCRD: CPT | Performed by: STUDENT IN AN ORGANIZED HEALTH CARE EDUCATION/TRAINING PROGRAM

## 2024-06-28 PROCEDURE — 3074F SYST BP LT 130 MM HG: CPT | Performed by: STUDENT IN AN ORGANIZED HEALTH CARE EDUCATION/TRAINING PROGRAM

## 2024-06-28 PROCEDURE — 95977 ALYS CPLX CN NPGT PRGRMG: CPT | Performed by: STUDENT IN AN ORGANIZED HEALTH CARE EDUCATION/TRAINING PROGRAM

## 2024-06-28 PROCEDURE — 3078F DIAST BP <80 MM HG: CPT | Performed by: STUDENT IN AN ORGANIZED HEALTH CARE EDUCATION/TRAINING PROGRAM

## 2024-06-28 ASSESSMENT — SLEEP AND FATIGUE QUESTIONNAIRES
HOW LIKELY ARE YOU TO NOD OFF OR FALL ASLEEP WHILE WATCHING TV: SLIGHT CHANCE OF DOZING
HOW LIKELY ARE YOU TO NOD OFF OR FALL ASLEEP WHILE SITTING AND READING: SLIGHT CHANCE OF DOZING
WORRIED_DISTRESSED_DUE_TO_SLEEP: SOMEWHAT
SLEEP_PROBLEM_NOTICEABLE_TO_OTHERS: SOMEWHAT
SATISFACTION_WITH_CURRENT_SLEEP_PATTERN: SATISFIED
HOW LIKELY ARE YOU TO NOD OFF OR FALL ASLEEP IN A CAR, WHILE STOPPED FOR A FEW MINUTES IN TRAFFIC: SLIGHT CHANCE OF DOZING
SLEEP_PROBLEM_INTERFERES_DAILY_ACTIVITIES: A LITTLE
HOW LIKELY ARE YOU TO NOD OFF OR FALL ASLEEP WHILE SITTING QUIETLY AFTER LUNCH WITHOUT ALCOHOL: WOULD NEVER DOZE
HOW LIKELY ARE YOU TO NOD OFF OR FALL ASLEEP WHILE LYING DOWN TO REST IN THE AFTERNOON WHEN CIRCUMSTANCES PERMIT: MODERATE CHANCE OF DOZING
HOW LIKELY ARE YOU TO NOD OFF OR FALL ASLEEP WHILE SITTING AND TALKING TO SOMEONE: SLIGHT CHANCE OF DOZING
HOW LIKELY ARE YOU TO NOD OFF OR FALL ASLEEP WHEN YOU ARE A PASSENGER IN A CAR FOR AN HOUR WITHOUT A BREAK: SLIGHT CHANCE OF DOZING
SITING INACTIVE IN A PUBLIC PLACE LIKE A CLASS ROOM OR A MOVIE THEATER: SLIGHT CHANCE OF DOZING
ESS-CHAD TOTAL SCORE: 8

## 2024-06-28 ASSESSMENT — ENCOUNTER SYMPTOMS
CONSTITUTIONAL NEGATIVE: 1
CARDIOVASCULAR NEGATIVE: 1
RESPIRATORY NEGATIVE: 1
NEUROLOGICAL NEGATIVE: 1
PSYCHIATRIC NEGATIVE: 1

## 2024-06-28 NOTE — ASSESSMENT & PLAN NOTE
Discussed about the proper techniques of using flonase  Patient voiced understanding  Continue Flonase and azelastine  Ok to continue to use nasal strip as needed.

## 2024-06-28 NOTE — PROGRESS NOTES
Patient: Hank Rick    [id number]  : 1945 -- AGE 78 y.o.    Provider: Stan Lopez MD     Location [facility]   Service Date: 2024              [facility]  Followup Visit Note    HISTORY OF PRESENT ILLNESS     HISTORY OF PRESENT ILLNESS   Hank Rick is a 78 y.o. male with h/o WALKER who presents to a [facility] for followup.     Assessment and plan from last visit: 2024    Mr. Rick is a 78 y.o. male and he returns in followup to the [facility] for WALKER.     # WALKER  - he had a HSAT that showed mod WALKER with 4% AHI around 25 and 3% AHI around 38.  - started on CPAP, diffuculty tolerating. Mask discomfort and trouble with leak  - Patient was implanted with HGNS therapy for WALKER on 2024 by Dr. Bob.  - activated in clinic 3/8/2024     Incoming settings: 0.8-1.8 (@1.0v)  FT: 1.9v  Outgoing setting 1.7-2.4 (@1.9v)  Start Delay: 30 min, Pause: 15 min, Therapy length: 9 hr        Disposition: Return to clinic in 2  months- at [facility].      Parker Watson  Sleep Fellow     Case Discussed with Attending, Dr. Lopez.     Current History    On today's visit, the patient reports doing well with the INSPIRE therapy.  Snoring is resolved.  He still wakes up about 3-4 times for bathroom and he is still working with urology for his BPH issues.  Still has some nasal congestion issues    RLS Followup:   none.    Daytime Symptoms    Patient reports DAYTIME SYMPTOMS: no daytime symptoms  Patient denies daytime symptoms including: Denies: excessive daytime sleepiness    Naps: No  Fatigue: denies feeling fatigue    ESS: 8  JESSIE: 6    REVIEW OF SYSTEMS     REVIEW OF SYSTEMS  Review of Systems   Constitutional: Negative.    HENT: Negative.     Respiratory: Negative.     Cardiovascular: Negative.    Genitourinary: Negative.    Skin: Negative.    Neurological: Negative.    Psychiatric/Behavioral: Negative.            ALLERGIES AND MEDICATIONS     ALLERGIES  Allergies   Allergen Reactions    Gabapentin Anxiety    Pollen Extracts Other     Runny nose       MEDICATIONS: He has a current medication list which includes the following prescription(s): acetaminophen - Take 2 tablets (1,300 mg) by mouth once daily as needed. Do not crush, chew, or split, albuterol - Inhale 2 puffs. Every 4 to 6 hours as needed as directed, amlodipine - Take 2 tablets (5 mg) by mouth once daily. take 2 tablets by mouth once daily, aspirin - Take 1 tablet (81 mg) by mouth once daily. Please re start on 01/17/2024, atorvastatin - Take 1 tablet (40 mg) by mouth once daily, azelastine - Administer 2 sprays into each nostril 2 times a day, clopidogrel - take 1 tablet by mouth once daily, diclofenac sodium - Apply topically 2 times a day as needed, fexofenadine - Take 1 tablet (180 mg) by mouth once daily, fluticasone - Administer 1-2 sprays into each nostril once daily, memantine - Take 1 tablet (10 mg) by mouth 2 times a day, montelukast - Take 1 tablet (10 mg) by mouth once daily, omeprazole otc - Take 1 tablet (20 mg) by mouth once daily, propranolol la - Take 1 capsule (60 mg) by mouth once daily, sildenafil - Take 0.5-1 tablets ( mg) by mouth if needed for erectile dysfunction (1 hour prior to sexual activity (max 1 per day)), tamsulosin - Take 1 capsule (0.4 mg) by mouth once daily, topiramate - TAKE 1-2 TABLETS AT BEDTIME, and trazodone - Take 1 tablet (50 mg) by mouth once daily at bedtime.    PAST MEDICAL HISTORY : He  has a past medical history of Arthritis, Asthma ([facility]), Cataract, COVID-19 (11/15/2022), Esophageal obstruction (05/15/2022), Essential tremor (12/29/2022), GERD (gastroesophageal reflux disease), Hard of hearing, Hearing aid worn, Hyperlipidemia, Hypertension, Memory impairment, Olecranon bursitis, left elbow (04/26/2016), Personal history of diseases of the blood and blood-forming organs and certain disorders  "involving the immune mechanism (05/17/2022), Personal history of other diseases of the digestive system (11/13/2014), Personal history of other diseases of the digestive system (01/14/2021), Personal history of other specified conditions (11/29/2018), Rash and other nonspecific skin eruption (11/02/2021), Salmonella enteritis, Sleep apnea, TIA (transient ischemic attack), and Vertebral artery stenosis.    PAST SURGICAL HISTORY: He  has a past surgical history that includes Other surgical history (01/07/2015); Tonsillectomy (11/18/2014); Refractive surgery (03/05/2014); Other surgical history (03/05/2014); MR angio head wo IV contrast (05/09/2023); MR angio neck wo IV contrast (05/09/2023); CT angio head w and wo IV contrast (05/12/2023); CT angio neck (05/12/2023); Other surgical history; Back surgery; Cataract extraction; Total shoulder arthroplasty (Right); and Hernia repair.     FAMILY HISTORY: No changes since previous visit. Otherwise non-contributory as charted.     SOCIAL HISTORY  He  reports that he has never smoked. He has never used smokeless tobacco. He reports current alcohol use of about 4.0 standard drinks of alcohol per week. He reports that he does not currently use drugs after having used the following drugs: Marijuana.       PHYSICAL EXAM     VITAL SIGNS: /75 (BP Location: Right arm, Patient Position: Sitting, BP Cuff Size: Adult)   Pulse 58   Temp 36.4 °C (97.6 °F) (Temporal)   Ht 1.854 m (6' 1\")   Wt 85.2 kg (187 lb 12.8 oz)   SpO2 92%   BMI 24.78 kg/m²      PREVIOUS WEIGHTS:  Wt Readings from Last 3 Encounters:   06/28/24 85.2 kg (187 lb 12.8 oz)   06/25/24 85.6 kg (188 lb 12.8 oz)   06/04/24 84.8 kg (187 lb)         RESULTS/DATA     Bicarbonate (mmol/L)   Date Value   04/19/2024 23   04/12/2024 27   01/14/2024 26     Iron (ug/dL)   Date Value   05/19/2022 140   06/16/2020 102     Iron Saturation (%)   Date Value   05/19/2022 54 (H)   06/16/2020 41     TIBC (ug/dL)   Date Value "   2022 257   2020 251     Ferritin (ug/L)   Date Value   2023 1,314 (H)   2022 1,073 (H)   2020 616 (H)       PAP Adherence  Not applicable    ASSESSMENT/PLAN     Mr. Rick is a 78 y.o. male and he returns in followup to the [facility] for WALKER.    Problem List, Orders, Assessment, Recommendations:  Problem List Items Addressed This Visit             ICD-10-CM    Allergic rhinitis - Primary J30.9     Discussed about the proper techniques of using flonase  Patient voiced understanding  Continue Flonase and azelastine  Ok to continue to use nasal strip as needed.         WALKER (obstructive sleep apnea) G47.33     Patient was implanted with HGNS therapy for WALKER on 2024 by Dr. Bob.    @ activation on 3/8/2024  Incision healing well, tongue motion normal  ST: 0.7v  FT: 1.0v  Outgoing setting 0.8-1.8 (@1.0v)  Start Delay: 30 min, Pause: 15 min, Therapy length: 9 hr    Visit on 2024  Incoming settings: 0.8-1.8 (@1.0v)  FT: 1.9v  Outgoing setting 1.7-2.4 (@1.9v)  Start Delay: 30 min, Pause: 15 min, Therapy length: 9 hr    On 2024  Incomin.7-2.4 (@ 2.0v)  Ougoing settin.1-3.0 (@ 2.4v)  Start delay: 20 min, pause: 15 min, therapy hour: 9 hr  ST: 0.8v, FT: 2.0v  Waveform looked good         Relevant Orders    Follow Up In Adult Sleep Medicine       Disposition    Return to clinic in 2 months

## 2024-06-28 NOTE — ASSESSMENT & PLAN NOTE
Patient was implanted with HGNS therapy for WALKER on 2024 by Dr. Bob.    @ activation on 3/8/2024  Incision healing well, tongue motion normal  ST: 0.7v  FT: 1.0v  Outgoing setting 0.8-1.8 (@1.0v)  Start Delay: 30 min, Pause: 15 min, Therapy length: 9 hr    Visit on 2024  Incoming settings: 0.8-1.8 (@1.0v)  FT: 1.9v  Outgoing setting 1.7-2.4 (@1.9v)  Start Delay: 30 min, Pause: 15 min, Therapy length: 9 hr    On 2024  Incomin.7-2.4 (@ 2.0v)  Ougoing settin.1-3.0 (@ 2.4v)  Start delay: 20 min, pause: 15 min, therapy hour: 9 hr  ST: 0.8v, FT: 2.0v  Waveform looked good

## 2024-07-03 ENCOUNTER — OFFICE VISIT (OUTPATIENT)
Dept: ORTHOPEDIC SURGERY | Facility: HOSPITAL | Age: 79
End: 2024-07-03
Payer: MEDICARE

## 2024-07-03 ENCOUNTER — HOSPITAL ENCOUNTER (OUTPATIENT)
Dept: RADIOLOGY | Facility: HOSPITAL | Age: 79
Discharge: HOME | End: 2024-07-03
Payer: MEDICARE

## 2024-07-03 ENCOUNTER — APPOINTMENT (OUTPATIENT)
Dept: UROLOGY | Facility: CLINIC | Age: 79
End: 2024-07-03
Payer: MEDICARE

## 2024-07-03 VITALS — HEIGHT: 72 IN | BODY MASS INDEX: 25.06 KG/M2 | WEIGHT: 185 LBS

## 2024-07-03 DIAGNOSIS — G56.20 ULNAR NEURITIS, UNSPECIFIED LATERALITY: ICD-10-CM

## 2024-07-03 DIAGNOSIS — M25.511 RIGHT SHOULDER PAIN, UNSPECIFIED CHRONICITY: ICD-10-CM

## 2024-07-03 PROCEDURE — 1159F MED LIST DOCD IN RCRD: CPT | Performed by: ORTHOPAEDIC SURGERY

## 2024-07-03 PROCEDURE — 99214 OFFICE O/P EST MOD 30 MIN: CPT | Performed by: ORTHOPAEDIC SURGERY

## 2024-07-03 PROCEDURE — 73030 X-RAY EXAM OF SHOULDER: CPT | Mod: RIGHT SIDE | Performed by: RADIOLOGY

## 2024-07-03 PROCEDURE — 73030 X-RAY EXAM OF SHOULDER: CPT | Mod: RT

## 2024-07-03 PROCEDURE — 1036F TOBACCO NON-USER: CPT | Performed by: ORTHOPAEDIC SURGERY

## 2024-07-03 NOTE — PROGRESS NOTES
Send 1 and half years status post reverse arthroplasty with revision.  He is dissatisfied with the current condition of his shoulder he gets a little bit of pain with usage no rest pain he cannot lift over 40 pounds and has difficulty with daily activities such as shaving with weakness interfering with his activities.    The patient is pleasant and cooperative.  The patient is alert and oriented ×3.  Auditory function is intact.  The patient is a good historian.  The patient is not in acute distress.  Eye exam significant for nonicteric sclera, intact ocular muscle movement.  Breathing is rhythmic symmetric and nonlabored.  Right radial pulse easily palpable active shoulder motion elevation 170 motor power in abduction 5/5 external rotation just past neutral and internal rotation to L1 5.  Motor power 3/5 internal rotation 5/5  and 5/5.  There is no pain on active or passive motion.    X-rays show components in good position with no evidence of loosening or loss of position    Right shoulder rotator cuff arthropathy    Options for treatment were discussed.  I recommended a formal consultation with physical therapy I believe his rehabilitation has been incomplete particularly with regard to rotation strength, and he should benefit significantly from the several more visits to PT.  I would like to reevaluate the patient in the 6 weeks after he started therapy.    In addition, the patient has ulnar neuritis and had been previously evaluated but it is continuing to bother him with tingling and numbness in his hand particularly the ulnar 2 digits.    I have referred him to Dr. rFanko Loomis for further evaluation.    This was dictated using voice recognition software and not corrected for grammatical or spelling errors.

## 2024-07-19 ENCOUNTER — OFFICE VISIT (OUTPATIENT)
Dept: CARDIOLOGY | Facility: HOSPITAL | Age: 79
End: 2024-07-19
Payer: MEDICARE

## 2024-07-19 ENCOUNTER — APPOINTMENT (OUTPATIENT)
Dept: CARDIOLOGY | Facility: HOSPITAL | Age: 79
End: 2024-07-19
Payer: MEDICARE

## 2024-07-19 VITALS
HEIGHT: 73 IN | WEIGHT: 192.3 LBS | SYSTOLIC BLOOD PRESSURE: 141 MMHG | DIASTOLIC BLOOD PRESSURE: 85 MMHG | BODY MASS INDEX: 25.49 KG/M2 | HEART RATE: 52 BPM

## 2024-07-19 DIAGNOSIS — I25.10 CORONARY ARTERY CALCIFICATION SEEN ON CT SCAN: ICD-10-CM

## 2024-07-19 DIAGNOSIS — I10 PRIMARY HYPERTENSION: ICD-10-CM

## 2024-07-19 DIAGNOSIS — E78.5 HYPERLIPIDEMIA, UNSPECIFIED HYPERLIPIDEMIA TYPE: Primary | ICD-10-CM

## 2024-07-19 DIAGNOSIS — R00.1 BRADYCARDIA: ICD-10-CM

## 2024-07-19 DIAGNOSIS — G31.84 MILD COGNITIVE IMPAIRMENT: ICD-10-CM

## 2024-07-19 PROCEDURE — 93010 ELECTROCARDIOGRAM REPORT: CPT | Performed by: INTERNAL MEDICINE

## 2024-07-19 PROCEDURE — 3077F SYST BP >= 140 MM HG: CPT | Performed by: INTERNAL MEDICINE

## 2024-07-19 PROCEDURE — 99203 OFFICE O/P NEW LOW 30 MIN: CPT | Performed by: INTERNAL MEDICINE

## 2024-07-19 PROCEDURE — 1159F MED LIST DOCD IN RCRD: CPT | Performed by: INTERNAL MEDICINE

## 2024-07-19 PROCEDURE — 99213 OFFICE O/P EST LOW 20 MIN: CPT | Performed by: INTERNAL MEDICINE

## 2024-07-19 PROCEDURE — 3079F DIAST BP 80-89 MM HG: CPT | Performed by: INTERNAL MEDICINE

## 2024-07-19 NOTE — PROGRESS NOTES
"Chief Complaint:   No chief complaint on file.     History Of Present Illness:    Hank Rick is a 79 y.o. male here regarding a slow HR. He has a history of mild cognitive impairment, coronary calcifications by CT (1/2024), hypertension, hyperlipidemia, GERD, esophageal stricture, CVA, asthma, tremor, WALKER, and BPH.    He reports feeling well. Denies any cardiac symptoms. Has had chronic bradycardia since being started on his propranolol years ago. He has been diagnosed with mild cognitive impairment and the question arose as to if his bradycardia was contributing to his symptoms. He reports symptom improvement since getting started on         Last Recorded Vitals:  Vitals:    07/19/24 1053   BP: 141/85   BP Location: Left arm   Weight: 87.2 kg (192 lb 4.8 oz)   Height: 1.854 m (6' 1\")             Allergies:  Gabapentin and Pollen extracts    Outpatient Medications:  Current Outpatient Medications   Medication Instructions    acetaminophen (TYLENOL 8 HOUR) 1,300 mg, oral, Daily PRN, Do not crush, chew, or split.    albuterol 90 mcg/actuation inhaler 2 puffs, inhalation, Every 4 to 6 hours as needed as directed    amLODIPine (NORVASC) 5 mg, oral, Daily, take 2 tablets by mouth once daily    aspirin 81 mg, oral, Daily, Please re start on 01/17/2024    atorvastatin (LIPITOR) 40 mg, oral, Daily    azelastine (Astelin) 137 mcg (0.1 %) nasal spray 2 sprays, Each Nostril, 2 times daily    clopidogrel (PLAVIX) 75 mg, oral, Daily    diclofenac sodium (Voltaren) 1 % gel gel Topical, 2 times daily PRN    fexofenadine (Allegra) 180 mg tablet 1 tablet, oral, Daily    fluticasone (Flonase) 50 mcg/actuation nasal spray 1-2 sprays, Each Nostril, Daily    memantine (NAMENDA) 10 mg, oral, 2 times daily    montelukast (SINGULAIR) 10 mg, oral, Daily    omeprazole OTC (PriLOSEC OTC) 20 mg EC tablet 1 tablet, oral, Daily    propranolol LA (INDERAL LA) 60 mg, oral, Daily    sildenafil (VIAGRA)  mg, oral, As needed    tamsulosin " (FLOMAX) 0.4 mg, oral, Daily    topiramate (Topamax) 25 mg tablet TAKE 1-2 TABLETS AT BEDTIME    traZODone (DESYREL) 50 mg, oral, Nightly         Physical Exam:  Gen Well nourished late septuagenarian male sitting up in NAD. Body mass index is 25.37 kg/m².  Eyes sclera nonicteric. Pupils normal size and symmetric.  Neck supple with no masses or thyromegaly.  CV myriam. No m/r/g appreciated. No JVD or leg edema. No carotid bruits appreciated.  Pulm Lungs clear with normal respiratory effort.  Skin No bruises rashes or jaundice. No induration or nodules.  Psych Appropriate affect and mood. Normal judgement and insight.  Neuro Alert and conversant. Grossly nonfocal.       I reviewed the patient's ECG - SB @ 52 bpm. IVCD. PRWP. LVH.    I reviewed most recent imaging / labs / and office notes as well as details of any recent hospitalizations or ED visits.      Assessment/Plan   1.  Bradycardia  Chronic since at least 2018 per record review. On propranolol which he takes for tremor and is the apparent culprit. Too mild to be contributing to his cognitive impairment in my estimation. No change in propranolol dose recommended.     2. Hypertension   BP suboptimal today but an outlier compared to his usual. On Norvasc which is to continue.    3. Hyperlipidemia / coronary calcification  On statin. Asymptomatic.       Followup prn        Abebe Baltazar MD

## 2024-07-23 ENCOUNTER — APPOINTMENT (OUTPATIENT)
Dept: UROLOGY | Facility: CLINIC | Age: 79
End: 2024-07-23
Payer: MEDICARE

## 2024-07-23 DIAGNOSIS — R35.0 URINARY FREQUENCY: Primary | ICD-10-CM

## 2024-07-23 LAB
POC APPEARANCE, URINE: CLEAR
POC BILIRUBIN, URINE: NEGATIVE
POC BLOOD, URINE: NEGATIVE
POC COLOR, URINE: YELLOW
POC GLUCOSE, URINE: NEGATIVE MG/DL
POC KETONES, URINE: NEGATIVE MG/DL
POC LEUKOCYTES, URINE: ABNORMAL
POC NITRITE,URINE: NEGATIVE
POC PH, URINE: 6.5 PH
POC PROTEIN, URINE: NEGATIVE MG/DL
POC SPECIFIC GRAVITY, URINE: 1.02
POC UROBILINOGEN, URINE: 1 EU/DL

## 2024-07-23 PROCEDURE — 81003 URINALYSIS AUTO W/O SCOPE: CPT | Performed by: PHYSICIAN ASSISTANT

## 2024-07-23 PROCEDURE — 99024 POSTOP FOLLOW-UP VISIT: CPT | Performed by: PHYSICIAN ASSISTANT

## 2024-07-23 ASSESSMENT — ENCOUNTER SYMPTOMS
PSYCHIATRIC NEGATIVE: 1
CARDIOVASCULAR NEGATIVE: 1
FREQUENCY: 1
EYES NEGATIVE: 1
NEUROLOGICAL NEGATIVE: 1
CONSTITUTIONAL NEGATIVE: 1
GASTROINTESTINAL NEGATIVE: 1
ALLERGIC/IMMUNOLOGIC NEGATIVE: 1
ENDOCRINE NEGATIVE: 1
RESPIRATORY NEGATIVE: 1
MUSCULOSKELETAL NEGATIVE: 1
HEMATOLOGIC/LYMPHATIC NEGATIVE: 1

## 2024-07-23 NOTE — PROGRESS NOTES
Subjective   Patient ID: Hank Rick is a 79 y.o. male who presents for No chief complaint on file..  HPI  Patient is a 79 yo male with nocturia managed well with cpap, BPH w/ LUTS, nocturia s/p HoLEP with Dr. Ohara 4/7/22, persistent nocturia tried and failed Oxybutynine, vesicare, trospium, toviaz, detrol and myrbetriq recently had a PVP 4/26/24 ,  persistent nocturia presnts for a follow up after trial of flomax.     Patient denies any improvement of  symptoms he reports persistent nocturia  4-5 times.  He denies any urinary symptoms during the day. He denies slow stream ir straining with urination.   Patient has been having ongoing Nocturia for many years. He reports Botox helped for a short period of time.   Patient admits to sleep apnea, but it is well controlled with a cpap    UA shows trace of leuks. Negative for nitrates.       Lab Results   Component Value Date    BUN 30 (H) 04/19/2024    CREATININE 1.19 04/19/2024    EGFR 63 04/19/2024     (L) 04/19/2024    K 4.0 04/19/2024     04/19/2024    CO2 23 04/19/2024    OSMOLALITY 259 (L) 08/21/2023    CALCIUM 8.9 04/19/2024      Lab Results   Component Value Date    WBC 6.9 04/19/2024    RBC 4.06 (L) 04/19/2024    HGB 12.4 (L) 04/19/2024    HCT 39.2 (L) 04/19/2024    MCV 97 04/19/2024    MCH 30.5 04/19/2024    MCHC 31.6 (L) 04/19/2024    RDW 12.2 04/19/2024     04/19/2024    MPV 10.1 05/09/2023      Recent urine culture negative.  Review of Systems   Constitutional: Negative.    HENT: Negative.     Eyes: Negative.    Respiratory: Negative.     Cardiovascular: Negative.    Gastrointestinal: Negative.    Endocrine: Negative.    Genitourinary:  Positive for frequency and urgency.   Musculoskeletal: Negative.    Skin: Negative.    Allergic/Immunologic: Negative.    Neurological: Negative.    Hematological: Negative.    Psychiatric/Behavioral: Negative.         Objective   Physical Exam  Constitutional:       General: He is not in acute  distress.     Appearance: Normal appearance.   HENT:      Head: Normocephalic and atraumatic.      Nose: Nose normal.      Mouth/Throat:      Mouth: Mucous membranes are moist.   Cardiovascular:      Rate and Rhythm: Normal rate.   Pulmonary:      Effort: Pulmonary effort is normal.   Abdominal:      General: Abdomen is flat.      Palpations: Abdomen is soft.   Musculoskeletal:      Cervical back: Normal range of motion.   Neurological:      Mental Status: He is alert.       Assessment/Plan     Nocturia  I advised a trial of Gemtesa. Samples given. I discussed it can take a month to see improvement of symptoms.   I discussed his symptoms most like worsened due to inflammation from recent PVP.  I discussed nocturia has been an ongoing issue for many years. Patient has tried and failed multiple medications and botox.  Patient reports sleep apnea is well controlled with cpap.   Patient is not a good candidate of DDAVP due to hx of low sodium.  Follow up with Dr. Alaniz as scheduled         Kevyn Toney PA-C 07/23/24 3:52 PM

## 2024-07-29 ENCOUNTER — APPOINTMENT (OUTPATIENT)
Dept: UROLOGY | Facility: CLINIC | Age: 79
End: 2024-07-29
Payer: MEDICARE

## 2024-07-29 VITALS — TEMPERATURE: 97.1 F | WEIGHT: 192 LBS | HEIGHT: 73 IN | BODY MASS INDEX: 25.45 KG/M2

## 2024-07-29 DIAGNOSIS — R35.0 URINARY FREQUENCY: Primary | ICD-10-CM

## 2024-07-29 DIAGNOSIS — N40.1 BPH WITH OBSTRUCTION/LOWER URINARY TRACT SYMPTOMS: ICD-10-CM

## 2024-07-29 DIAGNOSIS — N13.8 BPH WITH OBSTRUCTION/LOWER URINARY TRACT SYMPTOMS: ICD-10-CM

## 2024-07-29 PROCEDURE — G2211 COMPLEX E/M VISIT ADD ON: HCPCS | Performed by: UROLOGY

## 2024-07-29 PROCEDURE — 99214 OFFICE O/P EST MOD 30 MIN: CPT | Performed by: UROLOGY

## 2024-07-29 PROCEDURE — 1126F AMNT PAIN NOTED NONE PRSNT: CPT | Performed by: UROLOGY

## 2024-07-29 PROCEDURE — 51798 US URINE CAPACITY MEASURE: CPT | Performed by: UROLOGY

## 2024-07-29 PROCEDURE — 1036F TOBACCO NON-USER: CPT | Performed by: UROLOGY

## 2024-07-29 PROCEDURE — 1159F MED LIST DOCD IN RCRD: CPT | Performed by: UROLOGY

## 2024-07-29 ASSESSMENT — PAIN SCALES - GENERAL: PAINLEVEL: 0-NO PAIN

## 2024-07-29 NOTE — PROGRESS NOTES
Subjective   Hank Rick is a 79 y.o. male  with nocturia managed well with cpap, BPH w/ LUTS, nocturia s/p HoLEP with Dr. Ohara 4/7/22, persistent nocturia tried and failed Oxybutynine, vesicare, trospium, toviaz, detrol and myrbetriq as well as bladder botox, and recently had a PVP 4/26/24 to remove residual adenoma.    Patient presents today for 3 month post-op visit. He reports his urinary symptoms have significantly improved. His obstructive urinary symptoms have resolved. He was given a trial of Gemtesa for OAB on 7/23/2024 by Kevyn ONEILL. He states his urinary frequency and nocturia has improved.       Past Medical History:   Diagnosis Date    Arthritis     Asthma ([facility])     Cataract     COVID-19 11/15/2022    COVID-19 virus infection    Esophageal obstruction 05/15/2022    Esophageal stricture    Essential tremor 12/29/2022    Essential tremor    GERD (gastroesophageal reflux disease)     Hard of hearing     Hearing aid worn     Hyperlipidemia     Hypertension     Memory impairment     Olecranon bursitis, left elbow 04/26/2016    Olecranon bursitis of left elbow    Personal history of diseases of the blood and blood-forming organs and certain disorders involving the immune mechanism 05/17/2022    History of anemia    Personal history of other diseases of the digestive system 11/13/2014    History of gastroesophageal reflux (GERD)    Personal history of other diseases of the digestive system 01/14/2021    History of hiatal hernia    Personal history of other specified conditions 11/29/2018    History of urinary frequency    Rash and other nonspecific skin eruption 11/02/2021    Skin rash    Salmonella enteritis     Salmonella enteritis    Sleep apnea     TIA (transient ischemic attack)     Vertebral artery stenosis      Past Surgical History:   Procedure Laterality Date    BACK SURGERY      CATARACT EXTRACTION      CT ANGIO NECK  05/12/2023    CT NECK ANGIO W AND WO IV CONTRAST 5/12/2023 [facility] CT    CT  HEAD ANGIO W AND WO IV CONTRAST  05/12/2023    CT HEAD ANGIO W AND WO IV CONTRAST 5/12/2023 [facility] CT    HERNIA REPAIR      MR HEAD ANGIO WO IV CONTRAST  05/09/2023    MR HEAD ANGIO WO IV CONTRAST 5/9/2023 [facility]    MR NECK ANGIO WO IV CONTRAST  05/09/2023    MR NECK ANGIO WO IV CONTRAST 5/9/2023 [facility] MRI    OTHER SURGICAL HISTORY  01/07/2015    Esophagogastric Fundoplasty Laparoscopic For Paraesophageal Hernia Repair    OTHER SURGICAL HISTORY  03/05/2014    Shoulder Surgery Left    OTHER SURGICAL HISTORY      REFRACTIVE SURGERY  03/05/2014    Corneal LASIK    TONSILLECTOMY  11/18/2014    Tonsillectomy    TOTAL SHOULDER ARTHROPLASTY Right     reverse     Family History   Problem Relation Name Age of Onset    Multiple sclerosis Mother      Colon cancer Father       Current Outpatient Medications   Medication Sig Dispense Refill    acetaminophen (Tylenol 8 HOUR) 650 mg ER tablet Take 2 tablets (1,300 mg) by mouth once daily as needed. Do not crush, chew, or split.      albuterol 90 mcg/actuation inhaler Inhale 2 puffs. Every 4 to 6 hours as needed as directed      amLODIPine (Norvasc) 2.5 mg tablet Take 2 tablets (5 mg) by mouth once daily. take 2 tablets by mouth once daily 180 tablet 2    aspirin 81 mg EC tablet Take 1 tablet (81 mg) by mouth once daily. Please re start on 01/17/2024 30 tablet 0    atorvastatin (Lipitor) 40 mg tablet Take 1 tablet (40 mg) by mouth once daily. 90 tablet 1    azelastine (Astelin) 137 mcg (0.1 %) nasal spray Administer 2 sprays into each nostril 2 times a day. 30 mL 5    clopidogrel (Plavix) 75 mg tablet take 1 tablet by mouth once daily 90 tablet 1    diclofenac sodium (Voltaren) 1 % gel gel Apply topically 2 times a day as needed.      fexofenadine (Allegra) 180 mg tablet Take 1 tablet (180 mg) by mouth once daily.      fluticasone (Flonase) 50 mcg/actuation nasal spray Administer 1-2 sprays into each nostril once daily.      memantine (Namenda) 10 mg tablet Take 1 tablet (10 mg) by  mouth 2 times a day. 240 tablet 2    montelukast (Singulair) 10 mg tablet Take 1 tablet (10 mg) by mouth once daily. 90 tablet 2    omeprazole OTC (PriLOSEC OTC) 20 mg EC tablet Take 1 tablet (20 mg) by mouth once daily.      propranolol LA (Inderal LA) 60 mg 24 hr capsule Take 1 capsule (60 mg) by mouth once daily. 90 capsule 2    sildenafil (Viagra) 100 mg tablet Take 0.5-1 tablets ( mg) by mouth if needed for erectile dysfunction (1 hour prior to sexual activity (max 1 per day)). 30 tablet 6    tamsulosin (Flomax) 0.4 mg 24 hr capsule Take 1 capsule (0.4 mg) by mouth once daily. 30 capsule 11    topiramate (Topamax) 25 mg tablet TAKE 1-2 TABLETS AT BEDTIME 60 tablet 11    traZODone (Desyrel) 50 mg tablet Take 1 tablet (50 mg) by mouth once daily at bedtime. 90 tablet 2     No current facility-administered medications for this visit.     Allergies   Allergen Reactions    Gabapentin Anxiety    Pollen Extracts Other     Runny nose     Social History     Socioeconomic History    Marital status:      Spouse name: Not on file    Number of children: Not on file    Years of education: Not on file    Highest education level: Not on file   Occupational History    Not on file   Tobacco Use    Smoking status: Never    Smokeless tobacco: Never   Vaping Use    Vaping status: Never Used   Substance and Sexual Activity    Alcohol use: Yes     Alcohol/week: 4.0 standard drinks of alcohol     Types: 4 Shots of liquor per week     Comment: 3-4 per week    Drug use: Not Currently     Types: Marijuana     Comment: PAST USE 20 YEARS AGO    Sexual activity: Not on file   Other Topics Concern    Not on file   Social History Narrative    Not on file     Social Determinants of Health     Financial Resource Strain: Not on File (6/24/2024)    Received from [facility]    Financial Resource Strain     Financial Resource Strain: 0   Food Insecurity: Not on File (6/24/2024)    Received from [facility]    Food Insecurity      Food: 0   Transportation Needs: Not on File (2024)    Received from [facility]    Transportation Needs     Transportation: 0   Physical Activity: Not on File (2024)    Received from [facility]    Physical Activity     Physical Activity: 0   Stress: Not on File (2024)    Received from [facility]    Stress     Stress: 0   Social Connections: Not on File (2024)    Received from [facility]    Social Connections     Social Connections and Isolation: 0   Intimate Partner Violence: Not on file   Housing Stability: Not on File (2024)    Received from [facility]    Housing Stability     Housin       Review of Systems  Pertinent items are noted in HPI.    Objective       Lab Review  Lab Results   Component Value Date    WBC 6.9 2024    RBC 4.06 (L) 2024    HGB 12.4 (L) 2024    HCT 39.2 (L) 2024     2024      Lab Results   Component Value Date    BUN 30 (H) 2024    CREATININE 1.19 2024      Lab Results   Component Value Date    PSA 0.62 2022   Non residual PVR = 91ml   IPSS 6 and 4    Assessment/Plan   Diagnoses and all orders for this visit:  Urinary frequency  -     Measure post void residual  BPH with obstruction/lower urinary tract symptoms      BPH with LUTS s/p HoLEP in  now s/p PVP on 2024    We will order Gemetsa. We discussed the risks of Gemtesa which include incomplete bladder emptying, sinus pain, dry mouth, sore throat, joint pain, diarrhea, constipation, bloating, and anxiety. Patient understands and desires to proceed.    Follow up virtually in 1 month.     All questions were answered to the patient's satisfaction. Patient agrees with the plan and wishes to proceed. Follow-up will be scheduled appropriately.     E&M visit today is associated with current or anticipated ongoing medical care services related to a patient's single, serious condition or a complex condition.     Scribed for Dr. Alaniz by Flaquita Araiza.  I , Dr Alaniz, have personally reviewed and agreed with the information entered by the Virtual Scribe.

## 2024-07-31 ENCOUNTER — TELEPHONE (OUTPATIENT)
Dept: PRIMARY CARE | Facility: CLINIC | Age: 79
End: 2024-07-31
Payer: MEDICARE

## 2024-07-31 DIAGNOSIS — J45.909 ASTHMA, UNSPECIFIED ASTHMA SEVERITY, UNSPECIFIED WHETHER COMPLICATED, UNSPECIFIED WHETHER PERSISTENT (HHS-HCC): ICD-10-CM

## 2024-07-31 DIAGNOSIS — G89.4 CHRONIC PAIN SYNDROME: Primary | ICD-10-CM

## 2024-07-31 RX ORDER — TRAMADOL HYDROCHLORIDE 50 MG/1
50 TABLET ORAL EVERY 8 HOURS PRN
Qty: 15 TABLET | Refills: 0 | Status: SHIPPED | OUTPATIENT
Start: 2024-07-31 | End: 2024-08-05

## 2024-07-31 NOTE — TELEPHONE ENCOUNTER
Pt called stating that he was given a oxycodone prescription but he would like to switch to tramadol

## 2024-08-01 DIAGNOSIS — R35.0 URINARY FREQUENCY: ICD-10-CM

## 2024-08-01 RX ORDER — MIRABEGRON 50 MG/1
50 TABLET, EXTENDED RELEASE ORAL DAILY
Qty: 30 TABLET | Refills: 11 | Status: SHIPPED | OUTPATIENT
Start: 2024-08-01 | End: 2025-08-01

## 2024-08-02 DIAGNOSIS — R35.0 URINARY FREQUENCY: ICD-10-CM

## 2024-08-06 ENCOUNTER — OFFICE VISIT (OUTPATIENT)
Dept: ORTHOPEDIC SURGERY | Facility: HOSPITAL | Age: 79
End: 2024-08-06
Payer: MEDICARE

## 2024-08-06 VITALS — BODY MASS INDEX: 25.45 KG/M2 | WEIGHT: 192 LBS | HEIGHT: 73 IN

## 2024-08-06 DIAGNOSIS — G56.20 ULNAR NEURITIS, UNSPECIFIED LATERALITY: ICD-10-CM

## 2024-08-06 DIAGNOSIS — Z01.818 PREOP TESTING: Primary | ICD-10-CM

## 2024-08-06 PROCEDURE — 1159F MED LIST DOCD IN RCRD: CPT | Performed by: ORTHOPAEDIC SURGERY

## 2024-08-06 PROCEDURE — 1036F TOBACCO NON-USER: CPT | Performed by: ORTHOPAEDIC SURGERY

## 2024-08-06 PROCEDURE — 99214 OFFICE O/P EST MOD 30 MIN: CPT | Performed by: ORTHOPAEDIC SURGERY

## 2024-08-06 ASSESSMENT — PAIN - FUNCTIONAL ASSESSMENT: PAIN_FUNCTIONAL_ASSESSMENT: 0-10

## 2024-08-06 ASSESSMENT — PAIN SCALES - GENERAL: PAINLEVEL_OUTOF10: 5 - MODERATE PAIN

## 2024-08-06 NOTE — PROGRESS NOTES
79-year-old right-hand-dominant gentleman referred by Dr. Palumbo for evaluation of right hand ring and small finger numbness and tingling.  He had a reverse total shoulder done in February 2023 and noticed that postoperatively while wearing a sling the symptoms started to develop.  He saw a chiropractor a few times and treatment there offered minimal relief.  He notes that his symptoms are worse with resting the elbow on a hard surface or with prolonged elbow flexion.  He has been referred back to physical therapy by Dr. Sebastian for improvement of his range of motion of the shoulder following his replacement last year.  No history of diabetes.  He does have a history of spinal stenosis.  He is on Plavix because of a prior TIA.  He was seen by cardiology earlier this summer for chronic bradycardia.  No EKG test was performed at that visit but he was given an overall good report and instructed to follow-up as needed with cardiology.    Patient's self reported past medical history, medications, allergies, surgical history, family and social history as well as a 10 point review of systems has been documented in the new patient intake form and scanned into the patient's electronic medical record. Pertinent findings are documented in the HPI.    Physical Examination Findings:  Constitutional: Appears well-developed and well-nourished.  Head: Normocephalic and atraumatic.  Eyes: Pupils are equal and round.  Cardiovascular: Intact distal pulses.   Respiratory: Effort normal. No respiratory distress.  Neurologic: Alert and oriented to person, place, and time.  Skin: Skin is warm and dry.  Hematologic / Lymphatic: No lymphedema, lymphangitis.  Psychiatric: normal mood and affect. Behavior is normal.   Musculoskeletal: Right upper extremity semination reveals normal appearance.  I am able to move his shoulder to 90 degrees of abduction and external rotation.  Positive Tinel sign ulnar nerve cubital tunnel.  Positive elbow  flexion compression test.  No evidence of ulnar nerve instability.  No intrinsic muscle atrophy of the hand.  No clawing.  Negative Wartenberg's sign.  Good intrinsic muscle strength.  Diminished sensation in ring and small finger.    Review of EMG study done in April 2021 demonstrated a nonlocalizable ulnar neuropathy near the elbow.    Impression: Right cubital tunnel syndrome.    Plan: Diagnosis is clear based on physical examination and symptoms.  Confirmed by EMG study several years ago.  We have discussed the natural history of this condition.  Fortunately he does not have any motor issues at this point but this could develop if he ignores this problem.  Recommend avoidance of prolonged elbow flexion and resting the elbow on a hard surface.  Offered the patient ulnar nerve decompression with possible anterior transposition.  Discussed anticipated postoperative course.  He indicates that he would like to proceed with surgery for this problem.  We will have him obtain an EKG and basic blood work prior to surgery.  He has been instructed to hold his Plavix 5 days prior to surgery.    We discussed risks, benefits, alternatives and anticipated post op course including time away from work and activities following surgical treatment for the patient's condition. This is major surgery with identifiable risks. No guarantees for success have been provided. The patient has expressed understanding and has elected to pursue operative treatment.        For Surgical Planning:  Diagnosis: Right cubital tunnel syndrome  Procedure: Right elbow ulnar nerve decompression with possible anterior transposition  CPT: 43265  Anesthesia: General  Duration: 1 hour  Special Equipment Needed: None  Medical Notes / PM / DM / PAT needed?:  PAT requested, Plavix to be held 5 days  Location: Any  Initial Post Operative Visit: 2 weeks    Franko Loomis MD    [facility] [employer]  Department of  Orthopaedic Surgery  Chief of Hand and Upper Extremity Surgery  [facility]    Dictation performed with the use of voice recognition software. Syntax and grammatical errors may exist.

## 2024-08-06 NOTE — LETTER
August 6, 2024     Sonu Larose MD  [address]  [address]  [facility] [address]    Patient: Hank Rick   YOB: 1945   Date of Visit: 8/6/2024       Dear Dr. Sonu Larose MD:    Thank you for referring Hank Rick to me for evaluation. Below are my notes for this consultation.  If you have questions, please do not hesitate to call me. I look forward to following your patient along with you.       Sincerely,     Franko Loomis MD      CC: Daniel Sebastian MD  ______________________________________________________________________________________    79-year-old right-hand-dominant gentleman referred by Dr. Palumbo for evaluation of right hand ring and small finger numbness and tingling.  He had a reverse total shoulder done in February 2023 and noticed that postoperatively while wearing a sling the symptoms started to develop.  He saw a chiropractor a few times and treatment there offered minimal relief.  He notes that his symptoms are worse with resting the elbow on a hard surface or with prolonged elbow flexion.  He has been referred back to physical therapy by Dr. Sebastian for improvement of his range of motion of the shoulder following his replacement last year.  No history of diabetes.  He does have a history of spinal stenosis.  He is on Plavix because of a prior TIA.  He was seen by cardiology earlier this summer for chronic bradycardia.  No EKG test was performed at that visit but he was given an overall good report and instructed to follow-up as needed with cardiology.    Patient's self reported past medical history, medications, allergies, surgical history, family and social history as well as a 10 point review of systems has been documented in the new patient intake form and scanned into the patient's electronic medical record. Pertinent findings are documented in the HPI.    Physical Examination Findings:  Constitutional: Appears well-developed and well-nourished.  Head:  Normocephalic and atraumatic.  Eyes: Pupils are equal and round.  Cardiovascular: Intact distal pulses.   Respiratory: Effort normal. No respiratory distress.  Neurologic: Alert and oriented to person, place, and time.  Skin: Skin is warm and dry.  Hematologic / Lymphatic: No lymphedema, lymphangitis.  Psychiatric: normal mood and affect. Behavior is normal.   Musculoskeletal: Right upper extremity semination reveals normal appearance.  I am able to move his shoulder to 90 degrees of abduction and external rotation.  Positive Tinel sign ulnar nerve cubital tunnel.  Positive elbow flexion compression test.  No evidence of ulnar nerve instability.  No intrinsic muscle atrophy of the hand.  No clawing.  Negative Wartenberg's sign.  Good intrinsic muscle strength.  Diminished sensation in ring and small finger.    Review of EMG study done in April 2021 demonstrated a nonlocalizable ulnar neuropathy near the elbow.    Impression: Right cubital tunnel syndrome.    Plan: Diagnosis is clear based on physical examination and symptoms.  Confirmed by EMG study several years ago.  We have discussed the natural history of this condition.  Fortunately he does not have any motor issues at this point but this could develop if he ignores this problem.  Recommend avoidance of prolonged elbow flexion and resting the elbow on a hard surface.  Offered the patient ulnar nerve decompression with possible anterior transposition.  Discussed anticipated postoperative course.  He indicates that he would like to proceed with surgery for this problem.  We will have him obtain an EKG and basic blood work prior to surgery.  He has been instructed to hold his Plavix 5 days prior to surgery.    We discussed risks, benefits, alternatives and anticipated post op course including time away from work and activities following surgical treatment for the patient's condition. This is major surgery with identifiable risks. No guarantees for success have  been provided. The patient has expressed understanding and has elected to pursue operative treatment.        For Surgical Planning:  Diagnosis: Right cubital tunnel syndrome  Procedure: Right elbow ulnar nerve decompression with possible anterior transposition  CPT: 83673  Anesthesia: General  Duration: 1 hour  Special Equipment Needed: None  Medical Notes / PM / DM / PAT needed?:  PAT requested, Plavix to be held 5 days  Location: Any  Initial Post Operative Visit: 2 weeks    Franko Loomis MD    [facility] [employer]  Department of Orthopaedic Surgery  Chief of Hand and Upper Extremity Surgery  [facility]    Dictation performed with the use of voice recognition software. Syntax and grammatical errors may exist.

## 2024-08-09 RX ORDER — ALBUTEROL SULFATE 90 UG/1
INHALANT RESPIRATORY (INHALATION)
Qty: 25.5 G | Refills: 1 | Status: SHIPPED | OUTPATIENT
Start: 2024-08-09

## 2024-08-19 ENCOUNTER — APPOINTMENT (OUTPATIENT)
Dept: PRIMARY CARE | Facility: CLINIC | Age: 79
End: 2024-08-19
Payer: MEDICARE

## 2024-08-19 DIAGNOSIS — M48.062 LUMBAR STENOSIS WITH NEUROGENIC CLAUDICATION: ICD-10-CM

## 2024-08-19 DIAGNOSIS — G47.00 INSOMNIA, UNSPECIFIED TYPE: ICD-10-CM

## 2024-08-19 DIAGNOSIS — G89.29 OTHER CHRONIC PAIN: Primary | Chronic | ICD-10-CM

## 2024-08-19 DIAGNOSIS — M43.02 CERVICAL SPONDYLOLYSIS: ICD-10-CM

## 2024-08-19 DIAGNOSIS — F51.04 CHRONIC INSOMNIA: ICD-10-CM

## 2024-08-19 DIAGNOSIS — M54.16 LUMBAR RADICULOPATHY: ICD-10-CM

## 2024-08-19 PROCEDURE — 1159F MED LIST DOCD IN RCRD: CPT | Performed by: INTERNAL MEDICINE

## 2024-08-19 PROCEDURE — 1036F TOBACCO NON-USER: CPT | Performed by: INTERNAL MEDICINE

## 2024-08-19 PROCEDURE — 1160F RVW MEDS BY RX/DR IN RCRD: CPT | Performed by: INTERNAL MEDICINE

## 2024-08-19 PROCEDURE — 99212 OFFICE O/P EST SF 10 MIN: CPT | Performed by: INTERNAL MEDICINE

## 2024-08-19 PROCEDURE — 93005 ELECTROCARDIOGRAM TRACING: CPT

## 2024-08-19 RX ORDER — TRAZODONE HYDROCHLORIDE 50 MG/1
50 TABLET ORAL NIGHTLY
Qty: 90 TABLET | Refills: 2 | Status: SHIPPED | OUTPATIENT
Start: 2024-08-19

## 2024-08-19 RX ORDER — TRAMADOL HYDROCHLORIDE 50 MG/1
50 TABLET ORAL EVERY 8 HOURS PRN
Qty: 15 TABLET | Refills: 0 | Status: SHIPPED | OUTPATIENT
Start: 2024-08-19 | End: 2024-08-24

## 2024-08-19 ASSESSMENT — ENCOUNTER SYMPTOMS: BACK PAIN: 1

## 2024-08-19 NOTE — PROGRESS NOTES
Subjective   Patient ID: Hank Rick is a 79 y.o. male who presents for No chief complaint on file..    HPI       TELEHEALTH VISIT WITH THE PT     CHRONIC PAIN   WITH CERVICAL RADICULOPATYHY/  MYELOPATHY       LUMBAR CANAL STENOSIS   WITH NEUROGENIC CLAUDICATION     TRAMADOL HAS BEEN HELPING WITH THE PT   NONM SMOKER   NO HX OF ILLICIT DRUG USE     NO HX OF SIGNIFICANT ALCOHOL USE     PT HAS CHRONIC INSOMNIA   NEED TRAZODONE PRESCRIPTION FILLED     Review of Systems:  Constitutional: No fever or chills  Cardiovascular: no chest pain, no palpitations and no syncope.   Respiratory: no cough, no shortness of breath during exertion and no shortness of breath at rest.   Gastrointestinal: no abdominal pain, no nausea and no vomiting.  Neuro: No Headache, no dizziness    Physical Exam:   Constitutional: Alert and in no acute distress. Well developed, well nourished.   Head and Face: Head and face: Normal.     Eyes: Normal external exam.    Ears, Nose, Mouth, and Throat: External inspection of ears and nose: Normal.  Hearing: Normal.   Neck: No neck mass was observed. Supple.  Pulmonary: No respiratory distress.    Musculoskeletal: Range of motion: Normal.     Skin: Normal skin color and pigmentation, normal skin turgor, and no rash.    Neurologic: Coordination: Normal.    Psychiatric: Judgment and insight: Intact. Mood and affect: Normal.    Lab Results   Component Value Date    WBC 6.9 04/19/2024    HGB 12.4 (L) 04/19/2024    HCT 39.2 (L) 04/19/2024     04/19/2024    CHOL 151 08/31/2023    TRIG 144 08/31/2023    HDL 39.7 (A) 08/31/2023    ALT 17 04/19/2024    AST 16 04/19/2024     (L) 04/19/2024    K 4.0 04/19/2024     04/19/2024    CREATININE 1.19 04/19/2024    BUN 30 (H) 04/19/2024    CO2 23 04/19/2024    TSH 0.99 04/19/2024    PSA 0.62 12/05/2022    INR 1.0 04/12/2024    HGBA1C 5.6 08/22/2023       ECG 12 lead (Clinic Performed)  Sinus bradycardia  Left axis deviation  Incomplete left bundle  branch block  Minimal voltage criteria for LVH, may be normal variant  Abnormal ECG  When compared with ECG of 13-JAN-2024 05:56,  Incomplete left bundle branch block is now Present  Minimal criteria for Septal infarct are no longer Present      Assessment/Plan   Diagnoses and all orders for this visit:  Other chronic pain  -     traMADol (Ultram) 50 mg tablet; Take 1 tablet (50 mg) by mouth every 8 hours if needed for severe pain (7 - 10) (PRN PAIN) for up to 5 days.  Insomnia, unspecified type  -     traZODone (Desyrel) 50 mg tablet; Take 1 tablet (50 mg) by mouth once daily at bedtime.  Cervical spondylolysis  Lumbar stenosis with neurogenic claudication  Lumbar radiculopathy  Chronic insomnia    1.       OARRS REVIEWED , NO RED FLAG   TRAMADOL 50MG 1 PILL THREE TIMES A DFAY FOR PAIN QNTY 15   TRAZODONE  50MG 1 PILL AT NIGHT QNTY 90 1 REFILL       This Medical recommendation has been made based on the Telephonic/Video conversation and the history is given by the patient, which I as a Physician and the patient also understand that there are limitations given the lack of an in-person Physical Exam. Patient will call back if symptoms don't improve.    Your yearly Physical is due in:   When you call the office for your yearly Physical, please ask them to inform me to order your blood work, so that you can get the fasting blood work before your appointment and we can discuss the results at your physical.      A Doctor is always available by phone when the office is closed. Please feel free to call for help with any problem that you feel shouldn't wait until the office re-opens.     Sonu Larose MD

## 2024-08-21 ENCOUNTER — APPOINTMENT (OUTPATIENT)
Dept: INTEGRATIVE MEDICINE | Facility: CLINIC | Age: 79
End: 2024-08-21
Payer: MEDICARE

## 2024-08-21 DIAGNOSIS — M48.061 DEGENERATIVE LUMBAR SPINAL STENOSIS: ICD-10-CM

## 2024-08-21 DIAGNOSIS — M54.59 OTHER LOW BACK PAIN: Primary | ICD-10-CM

## 2024-08-21 PROCEDURE — 99212 OFFICE O/P EST SF 10 MIN: CPT | Performed by: NURSE PRACTITIONER

## 2024-08-21 NOTE — PROGRESS NOTES
Patient presents today for a Medicare acupuncture referral visit.     PMH: other low back pain, spacers were put in at L4/L5 for spinal stenosis. Admits right leg sciatica going into right thigh and knee.     When did the pain first start? 3 years ago. What makes it worse? Bending, walking,  What makes it better? Sitting down, recliner, stretching. Current treatments: Corticosteroid injections stopped work, spacer surgery, PT/aquatic therapy.     -Did acupuncture in July with Vivian Ni \[facility]". Did 4-5 sessions.     Patient would like an acupuncture referral.     Acupuncture explained and diagnostic codes were reviewed based on patients insurance carrier.

## 2024-08-23 DIAGNOSIS — G31.84 MILD COGNITIVE IMPAIRMENT: ICD-10-CM

## 2024-08-23 DIAGNOSIS — R35.0 URINARY FREQUENCY: ICD-10-CM

## 2024-08-23 LAB
ATRIAL RATE: 52 BPM
P AXIS: 9 DEGREES
P OFFSET: 182 MS
P ONSET: 129 MS
PR INTERVAL: 180 MS
Q ONSET: 219 MS
QRS COUNT: 9 BEATS
QRS DURATION: 114 MS
QT INTERVAL: 452 MS
QTC CALCULATION(BAZETT): 420 MS
QTC FREDERICIA: 430 MS
R AXIS: -32 DEGREES
T AXIS: -15 DEGREES
T OFFSET: 445 MS
VENTRICULAR RATE: 52 BPM

## 2024-08-30 RX ORDER — MEMANTINE HYDROCHLORIDE 10 MG/1
10 TABLET ORAL 2 TIMES DAILY
Qty: 180 TABLET | Refills: 3 | Status: SHIPPED | OUTPATIENT
Start: 2024-08-30

## 2024-09-03 ENCOUNTER — APPOINTMENT (OUTPATIENT)
Dept: UROLOGY | Facility: CLINIC | Age: 79
End: 2024-09-03
Payer: MEDICARE

## 2024-09-03 DIAGNOSIS — R35.0 URINARY FREQUENCY: ICD-10-CM

## 2024-09-03 DIAGNOSIS — N13.8 BPH WITH OBSTRUCTION/LOWER URINARY TRACT SYMPTOMS: ICD-10-CM

## 2024-09-03 DIAGNOSIS — N40.1 BPH WITH OBSTRUCTION/LOWER URINARY TRACT SYMPTOMS: ICD-10-CM

## 2024-09-03 PROCEDURE — 99213 OFFICE O/P EST LOW 20 MIN: CPT | Performed by: UROLOGY

## 2024-09-03 PROCEDURE — G2211 COMPLEX E/M VISIT ADD ON: HCPCS | Performed by: UROLOGY

## 2024-09-03 NOTE — PROGRESS NOTES
Subjective     This visit was completed via telemedicine. All issues as below were discussed and addressed but no physical exam was performed unless allowed by visual confirmation. If it was felt that the patient should be evaluated in clinic, then they were directed there. Patient verbally consented to visit.      Hank Rick is a 79 y.o. male with nocturia managed well with cpap, BPH w/ LUTS, nocturia s/p HoLEP with Dr. Ohara 4/7/22, persistent nocturia tried and failed Oxybutynine, vesicare, trospium, toviaz, detrol and myrbetriq as well as bladder botox, and recently had a PVP 4/26/24 to remove residual adenoma. He was started on Gemtesa for OAB and presents today to assess his response to the medication.     Patient reports excellent response to Gemtesa. His nocturia is down to once a night. Urinary urgency and frequency is well controlled. Patient is overall satisfied.      Past Medical History:   Diagnosis Date    Arthritis     Asthma ([facility])     Cataract     COVID-19 11/15/2022    COVID-19 virus infection    Esophageal obstruction 05/15/2022    Esophageal stricture    Essential tremor 12/29/2022    Essential tremor    GERD (gastroesophageal reflux disease)     Hard of hearing     Hearing aid worn     Hyperlipidemia     Hypertension     Memory impairment     Olecranon bursitis, left elbow 04/26/2016    Olecranon bursitis of left elbow    Personal history of diseases of the blood and blood-forming organs and certain disorders involving the immune mechanism 05/17/2022    History of anemia    Personal history of other diseases of the digestive system 11/13/2014    History of gastroesophageal reflux (GERD)    Personal history of other diseases of the digestive system 01/14/2021    History of hiatal hernia    Personal history of other specified conditions 11/29/2018    History of urinary frequency    Rash and other nonspecific skin eruption 11/02/2021    Skin rash    Salmonella enteritis     Salmonella  enteritis    Sleep apnea     TIA (transient ischemic attack)     Vertebral artery stenosis      Past Surgical History:   Procedure Laterality Date    BACK SURGERY      CATARACT EXTRACTION      CT ANGIO NECK  05/12/2023    CT NECK ANGIO W AND WO IV CONTRAST 5/12/2023 [facility]    CT HEAD ANGIO W AND WO IV CONTRAST  05/12/2023    CT HEAD ANGIO W AND WO IV CONTRAST 5/12/2023 [facility] CT    HERNIA REPAIR      MR HEAD ANGIO WO IV CONTRAST  05/09/2023    MR HEAD ANGIO WO IV CONTRAST 5/9/2023 [facility] MRI    MR NECK ANGIO WO IV CONTRAST  05/09/2023    MR NECK ANGIO WO IV CONTRAST 5/9/2023 [facility] MRI    OTHER SURGICAL HISTORY  01/07/2015    Esophagogastric Fundoplasty Laparoscopic For Paraesophageal Hernia Repair    OTHER SURGICAL HISTORY  03/05/2014    Shoulder Surgery Left    OTHER SURGICAL HISTORY      REFRACTIVE SURGERY  03/05/2014    Corneal LASIK    TONSILLECTOMY  11/18/2014    Tonsillectomy    TOTAL SHOULDER ARTHROPLASTY Right     reverse     Family History   Problem Relation Name Age of Onset    Multiple sclerosis Mother      Colon cancer Father       Current Outpatient Medications   Medication Sig Dispense Refill    acetaminophen (Tylenol 8 HOUR) 650 mg ER tablet Take 2 tablets (1,300 mg) by mouth once daily as needed. Do not crush, chew, or split.      albuterol 90 mcg/actuation inhaler Inhale 2 puffs every 4 to 6 hours as needed as directed 25.5 g 1    amLODIPine (Norvasc) 2.5 mg tablet Take 2 tablets (5 mg) by mouth once daily. take 2 tablets by mouth once daily 180 tablet 2    aspirin 81 mg EC tablet Take 1 tablet (81 mg) by mouth once daily. Please re start on 01/17/2024 30 tablet 0    atorvastatin (Lipitor) 40 mg tablet Take 1 tablet (40 mg) by mouth once daily. 90 tablet 1    azelastine (Astelin) 137 mcg (0.1 %) nasal spray Administer 2 sprays into each nostril 2 times a day. 30 mL 5    clopidogrel (Plavix) 75 mg tablet take 1 tablet by mouth once daily 90 tablet 1    diclofenac sodium (Voltaren) 1 % gel gel Apply topically  2 times a day as needed.      fexofenadine (Allegra) 180 mg tablet Take 1 tablet (180 mg) by mouth once daily.      fluticasone (Flonase) 50 mcg/actuation nasal spray Administer 1-2 sprays into each nostril once daily.      memantine (Namenda) 10 mg tablet Take 1 tablet (10 mg) by mouth 2 times a day. 180 tablet 3    montelukast (Singulair) 10 mg tablet Take 1 tablet (10 mg) by mouth once daily. 90 tablet 2    omeprazole OTC (PriLOSEC OTC) 20 mg EC tablet Take 1 tablet (20 mg) by mouth once daily.      propranolol LA (Inderal LA) 60 mg 24 hr capsule Take 1 capsule (60 mg) by mouth once daily. 90 capsule 2    sildenafil (Viagra) 100 mg tablet Take 0.5-1 tablets ( mg) by mouth if needed for erectile dysfunction (1 hour prior to sexual activity (max 1 per day)). 30 tablet 6    topiramate (Topamax) 25 mg tablet TAKE 1-2 TABLETS AT BEDTIME 60 tablet 11    traZODone (Desyrel) 50 mg tablet Take 1 tablet (50 mg) by mouth once daily at bedtime. 90 tablet 2    vibegron 75 mg tablet Take 1 tablet (75 mg) by mouth early in the morning.. 90 tablet 3     No current facility-administered medications for this visit.     Allergies   Allergen Reactions    Gabapentin Anxiety    Pollen Extracts Other     Runny nose     Social History     Socioeconomic History    Marital status:      Spouse name: Not on file    Number of children: Not on file    Years of education: Not on file    Highest education level: Not on file   Occupational History    Not on file   Tobacco Use    Smoking status: Never    Smokeless tobacco: Never   Vaping Use    Vaping status: Never Used   Substance and Sexual Activity    Alcohol use: Yes     Alcohol/week: 4.0 standard drinks of alcohol     Types: 4 Shots of liquor per week     Comment: 3-4 per week    Drug use: Not Currently     Types: Marijuana     Comment: PAST USE 20 YEARS AGO    Sexual activity: Not on file   Other Topics Concern    Not on file   Social History Narrative    Not on file     Social  Determinants of Health     Financial Resource Strain: Not on File (2024)    [facility] from [facility]    Financial Resource Strain     Financial Resource Strain: 0   Food Insecurity: Not on File (2024)    Received from [facility]    Food Insecurity     Food: 0   Transportation Needs: Not on File (2024)    Received from [facility]    Transportation Needs     Transportation: 0   Physical Activity: Not on File (2024)    [facility] from [facility]    Physical Activity     Physical Activity: 0   Stress: Not on File (2024)    Received from [facility]    Stress     Stress: 0   Social Connections: Not on File (2024)    Received from [facility]    Social Connections     Social Connections and Isolation: 0   Intimate Partner Violence: Not on file   Housing Stability: Not on File (2024)    Received from [facility]    Housing Stability     Housin       Review of Systems  Pertinent items are noted in HPI.    Objective       Lab Review  Lab Results   Component Value Date    WBC 6.9 2024    RBC 4.06 (L) 2024    HGB 12.4 (L) 2024    HCT 39.2 (L) 2024     2024      Lab Results   Component Value Date    BUN 30 (H) 2024    CREATININE 1.19 2024      Lab Results   Component Value Date    PSA 0.62 2022           Assessment/Plan   There are no diagnoses linked to this encounter.  BPH with LUTS s/p HoLEP in  now s/p PVP on 2024     Patient has had excellent response to Gemtesa.     We will refill Gemtesa 75mg for 1 year.    Follow up virtually in 1 year.     All questions were answered to the patient's satisfaction. Patient agrees with the plan and wishes to proceed. Follow-up will be scheduled appropriately.     E&M visit today is associated with current or anticipated ongoing medical care services related to a patient's single, serious condition or a complex condition.    Scribed for Dr. Alaniz by Flaquita Araiza. I , Dr Alaniz,  have personally reviewed and agreed with the information entered by the Virtual Scribe.

## 2024-09-05 ENCOUNTER — EVALUATION (OUTPATIENT)
Dept: PHYSICAL THERAPY | Facility: CLINIC | Age: 79
End: 2024-09-05
Payer: MEDICARE

## 2024-09-05 DIAGNOSIS — M25.511 RIGHT SHOULDER PAIN, UNSPECIFIED CHRONICITY: ICD-10-CM

## 2024-09-05 DIAGNOSIS — M25.611 SHOULDER STIFFNESS, RIGHT: Primary | ICD-10-CM

## 2024-09-05 PROCEDURE — 97161 PT EVAL LOW COMPLEX 20 MIN: CPT | Mod: GP | Performed by: PHYSICAL THERAPIST

## 2024-09-05 PROCEDURE — 97110 THERAPEUTIC EXERCISES: CPT | Mod: GP | Performed by: PHYSICAL THERAPIST

## 2024-09-05 ASSESSMENT — PAIN - FUNCTIONAL ASSESSMENT: PAIN_FUNCTIONAL_ASSESSMENT: 0-10

## 2024-09-05 ASSESSMENT — ENCOUNTER SYMPTOMS
OCCASIONAL FEELINGS OF UNSTEADINESS: 1
LOSS OF SENSATION IN FEET: 0
DEPRESSION: 0

## 2024-09-05 ASSESSMENT — PAIN SCALES - GENERAL: PAINLEVEL_OUTOF10: 0 - NO PAIN

## 2024-09-05 NOTE — PROGRESS NOTES
Physical Therapy  Physical Therapy Orthopedic Evaluation    Patient Name: Hank Rick  MRN: [id number]  Today's Date: 9/5/2024  Time Calculation  Start Time: 0907  Stop Time: 0942  Time Calculation (min): 35 min      Insurance:  Visit number: 1 of MN  Authorization info: No auth needed  Insurance Type: Medicare  Medicare auth dates: 9/5/2024 - 12/4/2024      General:  Reason for visit: R shoulder pain (H/o R reverse TSA Feb 2023)  Referred by: Dr. Sebastian      Current Problem  1. Shoulder stiffness, right  Follow Up In Physical Therapy      2. Right shoulder pain, unspecified chronicity  Referral to Physical Therapy    Follow Up In Physical Therapy          Precautions: asthma, h/o R reverse total shoulder replacement in 1/2023 with revision due to dislocation in 2/2023, hiatal hernia 2015, patient is on Plavix (blood thinner), history of TIA May 2023, Alzheimers/vascular dementia        Medical History Form: Reviewed (scanned into chart)    Subjective:     Chief Complaint: Patient presents to clinic with R shoulder stiffness. He underwent R total shoulder replacement in Feb 2023, and completed therapy. He has had difficulty reaching behind his back and across his body since surgery. He has been working diligently on his HEP over the last year, but did not notice any improvement in his mobility therefore returned to his surgeon who recommended patient return to PT.     Patient does have back pain that he has been dealing with for some time. He does continue to have back pain despite short term relief with piliates, stretching, and aquatic exercises. He is scheduled to see Dr. Sanon next week for radiating symptoms.     Patient reports he us scheduled for ulnar nerve surgery at the end of October.     Patient denies chest pain, jaw pain, neck pain, shortness of breath, etc.     Onset Date: 2/2023  NAIN: Chronic    Current Condition:   Same    Pain:  Pain Assessment: 0-10  0-10 (Numeric) Pain Score: 0 - No  "pain      Relevant Information (PMH & Previous Tests/Imaging): xray; no hardware complication   Previous Interventions/Treatments: Physical Therapy    % of Prior Level of Function (PLOF): 75%  Patient previously independent with all ADLs, currently has difficulty with shaving, combing hair, throwing a ball, washing under the L arm, washing back   Exercise/Physical Activity: pilate's, swimming, strength training  Work/School: retired    Patients Living Environment: Reviewed and no concern    Primary Language: English    There are no spiritual/cultural practices/values/needs that are important to know    Patient's Goal(s) for Therapy: \"Improve ROM\"    Red Flags: Do you have any of the following? Yes- numbness and tingling along ulnar nerve distribution (reports he is scheduled to undergo surgery for this in late Oct 2024).   Fever/chills, unexplained weight changes, dizziness/fainting, unexplained change in bowel or bladder functions, unexplained malaise or muscle weakness, night pain/sweats, numbness or tingling    Objective:  Objective       ROM      Shoulder AROM (Degrees)      (R)  (L)  Flexion: 154  176   Abduction: 150  173     Functional ER: 76  74     Functional IR: 47  70            Elbow AROM (Degrees)  WNL         Strength Testing    Shoulder    (R)  (L)  Flexion: 4/5  5/5      Abduction: 4-/5  5/5     ER:  3/5  5/5     IR:  3/5  5/5         Elbow    (R)  (L)  Flexion: 3+/5  5/5       Extension: 3+/5  5/5         Posture: R shoulder elevated at rest, forward head       Radicular Symptoms: none    Outcome Measures:  Other Measures  Disability of Arm Shoulder Hand (DASH): 45.45     EDUCATION: home exercise program, plan of care, activity modifications, pain management, and injury pathology       Goals: Set and discussed today  Active       PT Problem       PT Goal 1       Start:  09/05/24    Expected End:  12/04/24       In 4 weeks, patient will demonstrate improved shoulder flexion ROM to at least 165  " degrees in order to reach over head into tall cabinets without difficulty.     In 4 weeks, patient will demonstrate improved L shoulder IR ROM to at least 60 degrees in order to wash his back.     In 4 weeks, patient will demonstrate independence with UE strengthening exercises in order to improve UE strength.    In 4 weeks, patient will demonstrate improved R shoulder endurance in order to hold his arm up to shave without difficulty.     By discharge, patient will improve QuickDASH score by 8.      By discharge, patient will be independent with a final HEP.               Plan of care was developed with input and agreement by the patient      Treatment Performed:  Access Code: [email or web address]  URL: [email or web address]  Date: 09/05/2024  Prepared by: Dali Bridges    Exercises  - Standing Shoulder Row with Anchored Resistance  - 1 x daily - 7 x weekly - 2 sets - 10 reps  - Shoulder extension with resistance - Neutral  - 1 x daily - 7 x weekly - 2 sets - 10 reps  - Standing Isometric Shoulder Internal Rotation at Doorway  - 1 x daily - 7 x weekly - 2 sets - 10 reps - 5 second hold  - Standing Isometric Shoulder External Rotation with Doorway  - 1 x daily - 7 x weekly - 2 sets - 10 reps - 5 second hold  - Supine Shoulder Flexion Extension AAROM with Dowel  - 1 x daily - 7 x weekly - 2 sets - 10 reps - 5 second hold  - Standing Bilateral Shoulder Internal Rotation AAROM with Dowel  - 1 x daily - 7 x weekly - 2 sets - 10 reps - 5 second hold      Assessment: Patient presents with R shoulder ROM and strength limtiations, resulting in limited participation in pain-free ADLs and inability to perform at their prior level of function. Pt would benefit from physical therapy to address the impairments found & listed previously in the objective section in order to return to safe and pain-free ADLs and prior level of function.       Clinical Presentation: Stable and/or uncomplicated characteristics      Plan:      Planned Interventions include: therapeutic exercise, self-care home management, manual therapy, therapeutic activities, gait training, neuromuscular coordination, vasopneumatic, dry needling, aquatic therapy  Frequency: 1-2 x Week  Duration: 6 Weeks  Rehab Potential/Prognosis: Fair d/t surgical history      Dali Bridges, PT

## 2024-09-06 ENCOUNTER — APPOINTMENT (OUTPATIENT)
Dept: SLEEP MEDICINE | Facility: CLINIC | Age: 79
End: 2024-09-06
Payer: MEDICARE

## 2024-09-06 VITALS
DIASTOLIC BLOOD PRESSURE: 85 MMHG | SYSTOLIC BLOOD PRESSURE: 142 MMHG | WEIGHT: 195.4 LBS | HEART RATE: 61 BPM | HEIGHT: 73 IN | BODY MASS INDEX: 25.9 KG/M2 | TEMPERATURE: 96.5 F

## 2024-09-06 DIAGNOSIS — J30.9 ALLERGIC RHINITIS, UNSPECIFIED SEASONALITY, UNSPECIFIED TRIGGER: ICD-10-CM

## 2024-09-06 DIAGNOSIS — G47.30 SLEEP APNEA, UNSPECIFIED TYPE: Primary | ICD-10-CM

## 2024-09-06 DIAGNOSIS — G47.33 OSA (OBSTRUCTIVE SLEEP APNEA): ICD-10-CM

## 2024-09-06 PROCEDURE — 1160F RVW MEDS BY RX/DR IN RCRD: CPT | Performed by: STUDENT IN AN ORGANIZED HEALTH CARE EDUCATION/TRAINING PROGRAM

## 2024-09-06 PROCEDURE — 3079F DIAST BP 80-89 MM HG: CPT | Performed by: STUDENT IN AN ORGANIZED HEALTH CARE EDUCATION/TRAINING PROGRAM

## 2024-09-06 PROCEDURE — 3077F SYST BP >= 140 MM HG: CPT | Performed by: STUDENT IN AN ORGANIZED HEALTH CARE EDUCATION/TRAINING PROGRAM

## 2024-09-06 PROCEDURE — G2211 COMPLEX E/M VISIT ADD ON: HCPCS | Performed by: STUDENT IN AN ORGANIZED HEALTH CARE EDUCATION/TRAINING PROGRAM

## 2024-09-06 PROCEDURE — 95977 ALYS CPLX CN NPGT PRGRMG: CPT | Performed by: STUDENT IN AN ORGANIZED HEALTH CARE EDUCATION/TRAINING PROGRAM

## 2024-09-06 PROCEDURE — 99214 OFFICE O/P EST MOD 30 MIN: CPT | Performed by: STUDENT IN AN ORGANIZED HEALTH CARE EDUCATION/TRAINING PROGRAM

## 2024-09-06 PROCEDURE — 1159F MED LIST DOCD IN RCRD: CPT | Performed by: STUDENT IN AN ORGANIZED HEALTH CARE EDUCATION/TRAINING PROGRAM

## 2024-09-06 PROCEDURE — 1036F TOBACCO NON-USER: CPT | Performed by: STUDENT IN AN ORGANIZED HEALTH CARE EDUCATION/TRAINING PROGRAM

## 2024-09-06 ASSESSMENT — ENCOUNTER SYMPTOMS
CONSTITUTIONAL NEGATIVE: 1
PSYCHIATRIC NEGATIVE: 1
RESPIRATORY NEGATIVE: 1
CARDIOVASCULAR NEGATIVE: 1
NEUROLOGICAL NEGATIVE: 1

## 2024-09-06 NOTE — PROGRESS NOTES
Patient: Hank Rick    [id number]  : 1945 -- AGE 79 y.o.    Provider: Stan Lopez MD     Location [facility]   Service Date: 2024              [facility]  Followup Visit Note    HISTORY OF PRESENT ILLNESS     HISTORY OF PRESENT ILLNESS   Hank Rick is a 79 y.o. male with h/o WALKER who presents to a [facility] for followup.     Assessment and plan from last visit: 2024    Mr. Rick is a 78 y.o. male and he returns in followup to the [facility] for WALKER.     Problem List, Orders, Assessment, Recommendations:  Problem List Items Addressed This Visit               ICD-10-CM     Allergic rhinitis - Primary J30.9       Discussed about the proper techniques of using flonase  Patient voiced understanding  Continue Flonase and azelastine  Ok to continue to use nasal strip as needed.           WALKER (obstructive sleep apnea) G47.33       Patient was implanted with HGNS therapy for WALKER on 2024 by Dr. Bob.     @ activation on 3/8/2024  Incision healing well, tongue motion normal  ST: 0.7v  FT: 1.0v  Outgoing setting 0.8-1.8 (@1.0v)  Start Delay: 30 min, Pause: 15 min, Therapy length: 9 hr     Visit on 2024  Incoming settings: 0.8-1.8 (@1.0v)  FT: 1.9v  Outgoing setting 1.7-2.4 (@1.9v)  Start Delay: 30 min, Pause: 15 min, Therapy length: 9 hr     On 2024  Incomin.7-2.4 (@ 2.0v)  Ougoing settin.1-3.0 (@ 2.4v)  Start delay: 20 min, pause: 15 min, therapy hour: 9 hr  ST: 0.8v, FT: 2.0v  Waveform looked good           Relevant Orders     Follow Up In Adult Sleep Medicine         Disposition     Return to clinic in 2 months    Current History    On today's visit, the patient reports that he has been doing fine.  No issues with the therapy.  Using it nightly.  Snoring is almost completely resolved.  He has stepped up the therapy amplitude without issues.    RLS Followup:    none.    Daytime Symptoms    Patient reports DAYTIME SYMPTOMS: no daytime symptoms  Patient denies daytime symptoms including: Denies: excessive daytime sleepiness    Naps: No  Fatigue: denies feeling fatigue    REVIEW OF SYSTEMS     REVIEW OF SYSTEMS  Review of Systems   Constitutional: Negative.    HENT: Negative.     Respiratory: Negative.     Cardiovascular: Negative.    Genitourinary: Negative.    Skin: Negative.    Neurological: Negative.    Psychiatric/Behavioral: Negative.           ALLERGIES AND MEDICATIONS     ALLERGIES  Allergies   Allergen Reactions    Gabapentin Anxiety    Pollen Extracts Other     Runny nose       MEDICATIONS: He has a current medication list which includes the following prescription(s): acetaminophen - Take 2 tablets (1,300 mg) by mouth once daily as needed. Do not crush, chew, or split, albuterol - Inhale 2 puffs every 4 to 6 hours as needed as directed, amlodipine - Take 2 tablets (5 mg) by mouth once daily. take 2 tablets by mouth once daily, atorvastatin - Take 1 tablet (40 mg) by mouth once daily, azelastine - Administer 2 sprays into each nostril 2 times a day, clopidogrel - take 1 tablet by mouth once daily, fexofenadine - Take 1 tablet (180 mg) by mouth once daily, fluticasone - Administer 1-2 sprays into each nostril once daily, memantine - Take 1 tablet (10 mg) by mouth 2 times a day, montelukast - Take 1 tablet (10 mg) by mouth once daily, omeprazole otc - Take 1 tablet (20 mg) by mouth once daily, propranolol la - Take 1 capsule (60 mg) by mouth once daily, sildenafil - Take 0.5-1 tablets ( mg) by mouth if needed for erectile dysfunction (1 hour prior to sexual activity (max 1 per day)), topiramate - TAKE 1-2 TABLETS AT BEDTIME, trazodone - Take 1 tablet (50 mg) by mouth once daily at bedtime, vibegron - Take 1 tablet (75 mg) by mouth early in the morning., and aspirin - Take 1 tablet (81 mg) by mouth once daily. Please re start on 01/17/2024.    PAST MEDICAL  "HISTORY : He  has a past medical history of Arthritis, Asthma ([facility]), Cataract, COVID-19 (11/15/2022), Esophageal obstruction (05/15/2022), Essential tremor (12/29/2022), GERD (gastroesophageal reflux disease), Hard of hearing, Hearing aid worn, Hyperlipidemia, Hypertension, Memory impairment, Olecranon bursitis, left elbow (04/26/2016), Personal history of diseases of the blood and blood-forming organs and certain disorders involving the immune mechanism (05/17/2022), Personal history of other diseases of the digestive system (11/13/2014), Personal history of other diseases of the digestive system (01/14/2021), Personal history of other specified conditions (11/29/2018), Rash and other nonspecific skin eruption (11/02/2021), Salmonella enteritis, Sleep apnea, TIA (transient ischemic attack), and Vertebral artery stenosis.    PAST SURGICAL HISTORY: He  has a past surgical history that includes Other surgical history (01/07/2015); Tonsillectomy (11/18/2014); Refractive surgery (03/05/2014); Other surgical history (03/05/2014); MR angio head wo IV contrast (05/09/2023); MR angio neck wo IV contrast (05/09/2023); CT angio head w and wo IV contrast (05/12/2023); CT angio neck (05/12/2023); Other surgical history; Back surgery; Cataract extraction; Total shoulder arthroplasty (Right); and Hernia repair.     FAMILY HISTORY: No changes since previous visit. Otherwise non-contributory as charted.     SOCIAL HISTORY  He  reports that he has never smoked. He has never used smokeless tobacco. He reports current alcohol use of about 4.0 standard drinks of alcohol per week. He reports that he does not currently use drugs after having used the following drugs: Marijuana.       PHYSICAL EXAM     VITAL SIGNS: /85 (BP Location: Right arm, Patient Position: Sitting, BP Cuff Size: Adult)   Pulse 61   Temp 35.8 °C (96.5 °F) (Temporal)   Ht 1.854 m (6' 1\")   Wt 88.6 kg (195 lb 6.4 oz)   BMI 25.78 kg/m²      PREVIOUS " WEIGHTS:  Wt Readings from Last 3 Encounters:   24 88.6 kg (195 lb 6.4 oz)   24 87.1 kg (192 lb)   24 87.1 kg (192 lb)         RESULTS/DATA     Bicarbonate (mmol/L)   Date Value   2024 23   2024 27   2024 26     Iron (ug/dL)   Date Value   2022 140   2020 102     Iron Saturation (%)   Date Value   2022 54 (H)   2020 41     TIBC (ug/dL)   Date Value   2022 257   2020 251     Ferritin (ug/L)   Date Value   2023 1,314 (H)   2022 1,073 (H)   2020 616 (H)       PAP Adherence  Not applicable    ASSESSMENT/PLAN     Mr. Rick is a 79 y.o. male and he returns in followup to the [facility] for WALKER.    Problem List, Orders, Assessment, Recommendations:  Problem List Items Addressed This Visit             ICD-10-CM    Allergic rhinitis J30.9     Better control now  Continue Flonase and azelastine  Ok to continue to use nasal strip as needed.         WALKER (obstructive sleep apnea) G47.33     Patient was implanted with HGNS therapy for WALKER on 2024 by Dr. Bob.    @ activation on 3/8/2024  Incision healing well, tongue motion normal  ST: 0.7v  FT: 1.0v  Outgoing setting 0.8-1.8 (@1.0v)  Start Delay: 30 min, Pause: 15 min, Therapy length: 9 hr    Visit on 2024  Incoming settings: 0.8-1.8 (@1.0v)  FT: 1.9v  Outgoing setting 1.7-2.4 (@1.9v)  Start Delay: 30 min, Pause: 15 min, Therapy length: 9 hr    On 2024  Incomin.7-2.4 (@ 2.0v)  Ougoing settin.1-3.0 (@ 2.4v)  Start delay: 20 min, pause: 15 min, therapy hour: 9 hr  ST: 0.8v, FT: 2.0v  Waveform looked good    On 2024  Good usage: 60hr/wk  Incomin.1-3.0 (@ 2.7v)  Ougoing settin.5-2.9 (@ 2.7v)  Start delay: 20 min, pause: 15 min, therapy hour: 9 hr  ST: 1.0v, FT: 2.0v  Waveform looked good    We will send for a sleep study (HSAT) to evaluate therapy effectiveness          Other Visit Diagnoses         Codes    Sleep apnea,  unspecified type    -  Primary G47.30    Relevant Orders    Home sleep apnea test (HSAT)            Disposition    Will call with sleep study results and determine F/U plan

## 2024-09-06 NOTE — ASSESSMENT & PLAN NOTE
Patient was implanted with HGNS therapy for WALKER on 2024 by Dr. Bob.    @ activation on 3/8/2024  Incision healing well, tongue motion normal  ST: 0.7v  FT: 1.0v  Outgoing setting 0.8-1.8 (@1.0v)  Start Delay: 30 min, Pause: 15 min, Therapy length: 9 hr    Visit on 2024  Incoming settings: 0.8-1.8 (@1.0v)  FT: 1.9v  Outgoing setting 1.7-2.4 (@1.9v)  Start Delay: 30 min, Pause: 15 min, Therapy length: 9 hr    On 2024  Incomin.7-2.4 (@ 2.0v)  Ougoing settin.1-3.0 (@ 2.4v)  Start delay: 20 min, pause: 15 min, therapy hour: 9 hr  ST: 0.8v, FT: 2.0v  Waveform looked good    On 2024  Good usage: 60hr/wk  Incomin.1-3.0 (@ 2.7v)  Ougoing settin.5-2.9 (@ 2.7v)  Start delay: 20 min, pause: 15 min, therapy hour: 9 hr  ST: 1.0v, FT: 2.0v  Waveform looked good    We will send for a sleep study (HSAT) to evaluate therapy effectiveness

## 2024-09-10 ENCOUNTER — APPOINTMENT (OUTPATIENT)
Dept: ORTHOPEDIC SURGERY | Facility: CLINIC | Age: 79
End: 2024-09-10
Payer: MEDICARE

## 2024-09-10 ENCOUNTER — OFFICE VISIT (OUTPATIENT)
Dept: ORTHOPEDIC SURGERY | Facility: CLINIC | Age: 79
End: 2024-09-10
Payer: MEDICARE

## 2024-09-10 DIAGNOSIS — M48.061 SPINAL STENOSIS OF LUMBAR REGION, UNSPECIFIED WHETHER NEUROGENIC CLAUDICATION PRESENT: Primary | ICD-10-CM

## 2024-09-10 PROCEDURE — 1159F MED LIST DOCD IN RCRD: CPT | Performed by: PHYSICIAN ASSISTANT

## 2024-09-10 PROCEDURE — 1036F TOBACCO NON-USER: CPT | Performed by: PHYSICIAN ASSISTANT

## 2024-09-10 PROCEDURE — 99213 OFFICE O/P EST LOW 20 MIN: CPT | Performed by: PHYSICIAN ASSISTANT

## 2024-09-10 NOTE — PROGRESS NOTES
Hank returns today for follow-up appointment.  He is a previous patient of Dr. Sanon, he was last seen in November 2023 where they were reviewing his MRI and discussing surgery versus nonsurgical options.  At that time they did discuss that he would like to exhaust all nonsurgical management.    We are now almost a year later and the patient states that his symptoms are definitely starting to change.  He is now exhibiting equal low back and lower extremity symptoms at this point.  He states that they are worse in the morning with radiculopathy and bilateral hip pain.  He states that the symptoms in the leg have started to get worse and interfere with his daily activity over the last 5 months.  He does have limited range of motion, he states he can only walk now about a quarter of a mile.    From a conservative standpoint, he has done acupuncture, Pilates, water therapy, land therapy, gabapentin, Mobic, oxycodone, tramadol all with minimal improvement.    The patient and his wife had questions regarding the surgery and expected healing timeframe and I did answer those to the best my knowledge.  I did encourage the patient though to make an appointment to discuss surgical steps with Dr. Sanon.  They were given the next available appointment to do so.  They would like to try to get this done in the next month or so.    I reviewed the complete 30-point review of systems that was documented on the scanned patient intake form.  All other systems are non-contributory except as defined in history of present illness.    This note was dictated using speech recognition software and was not corrected for spelling or grammatical errors

## 2024-09-11 ENCOUNTER — TELEPHONE (OUTPATIENT)
Dept: PRIMARY CARE | Facility: CLINIC | Age: 79
End: 2024-09-11
Payer: MEDICARE

## 2024-09-12 ENCOUNTER — TREATMENT (OUTPATIENT)
Dept: PHYSICAL THERAPY | Facility: CLINIC | Age: 79
End: 2024-09-12
Payer: MEDICARE

## 2024-09-12 DIAGNOSIS — M25.611 SHOULDER STIFFNESS, RIGHT: ICD-10-CM

## 2024-09-12 DIAGNOSIS — M25.511 RIGHT SHOULDER PAIN, UNSPECIFIED CHRONICITY: ICD-10-CM

## 2024-09-12 DIAGNOSIS — G89.29 OTHER CHRONIC PAIN: Primary | ICD-10-CM

## 2024-09-12 PROCEDURE — 97110 THERAPEUTIC EXERCISES: CPT | Mod: GP | Performed by: PHYSICAL THERAPIST

## 2024-09-12 NOTE — PROGRESS NOTES
Physical Therapy  Physical Therapy Treatment Note    Patient Name: Hank Rick  MRN: [id number]  Today's Date: 9/12/2024  Time Calculation  Start Time: 0905  Stop Time: 0940  Time Calculation (min): 35 min    Insurance:  Visit number: 2 of MN  Authorization info: No auth  Insurance Type: Medicare  Medicare auth dates: 9/5/2024 - 12/4/2024     General:  Reason for visit: R shoulder pain (H/o R reverse TSA Feb 2023)  Referred by: Dr. Sebastian    Current Problem  1. Right shoulder pain, unspecified chronicity  Follow Up In Physical Therapy      2. Shoulder stiffness, right  Follow Up In Physical Therapy          Precautions: asthma, h/o R reverse total shoulder replacement in 1/2023 with revision due to dislocation in 2/2023, hiatal hernia 2015, patient is on Plavix (blood thinner), history of TIA May 2023, Alzheimers/vascular dementia       Subjective:     Patient reports his shoulder does tend to feel better when he does his exercises. No pain reported upon arrival but he does note occasional stiffness.     Pain   0/10    Performing HEP?: Yes      Objective:   Shoulder hiking with shoulder flexion (able to correct with visual feedback from mirror)      Treatment Performed:  - UBE x 3' fwd/back  - cybex rows 5# 2 x 12  - cybex shoulder extension 2.5# 2 x 12   - shoulder ER iso at wall 2 x 12   - shoulder IR iso at wall 2 x 12  - cane flexion in standing x 20   - cane IR in standing x 20  - seated elbow flexion (4#) 2 x 12   - flex/scap/abd x 10 ea (mirror feedback)  - wrist flex/ext with 2# DB 2 x 10 each         HEP Access Code: [email or web address]   Exercises  - Standing Shoulder Row with Anchored Resistance  - 1 x daily - 7 x weekly - 2 sets - 10 reps  - Shoulder extension with resistance - Neutral  - 1 x daily - 7 x weekly - 2 sets - 10 reps  - Standing Isometric Shoulder Internal Rotation at Doorway  - 1 x daily - 7 x weekly - 2 sets - 10 reps - 5 second hold  - Standing Isometric Shoulder External Rotation with Doorway   - 1 x daily - 7 x weekly - 2 sets - 10 reps - 5 second hold  - Supine Shoulder Flexion Extension AAROM with Dowel  - 1 x daily - 7 x weekly - 2 sets - 10 reps - 5 second hold  - Standing Bilateral Shoulder Internal Rotation AAROM with Dowel  - 1 x daily - 7 x weekly - 2 sets - 10 reps - 5 second hold      Charges:  2- TE    Assessment:   The focus of today's session was on UE strengthening and ROM. The pt demonstrated excellent tolerance to the noted exercises today.No pain was reported during the treatment session today. The patient continues to present with limitations in UE strength at this time therefore would continue to benefit from skilled PT.    ROM does seen to be fairly appropriate for a reverse TSA therefore focused most treatment on improving strength. Patient did require visual feedback of a mirror to prevent shoulder hiking with flexion.         Plan:  Monitor response to treatment and progress strengthening exercises as tolerated.      Dali Bridges, PT

## 2024-09-15 RX ORDER — TRAMADOL HYDROCHLORIDE 50 MG/1
50 TABLET ORAL EVERY 8 HOURS PRN
Qty: 9 TABLET | Refills: 0 | Status: SHIPPED | OUTPATIENT
Start: 2024-09-15 | End: 2024-09-18

## 2024-09-19 ENCOUNTER — TREATMENT (OUTPATIENT)
Dept: PHYSICAL THERAPY | Facility: CLINIC | Age: 79
End: 2024-09-19
Payer: MEDICARE

## 2024-09-19 DIAGNOSIS — M25.611 SHOULDER STIFFNESS, RIGHT: ICD-10-CM

## 2024-09-19 DIAGNOSIS — M25.511 RIGHT SHOULDER PAIN, UNSPECIFIED CHRONICITY: ICD-10-CM

## 2024-09-19 PROCEDURE — 97110 THERAPEUTIC EXERCISES: CPT | Mod: GP | Performed by: PHYSICAL THERAPIST

## 2024-09-19 NOTE — PROGRESS NOTES
Physical Therapy  Physical Therapy Treatment Note    Patient Name: Hank Rick  MRN: [id number]  Today's Date: 9/19/2024  Time Calculation  Start Time: 0910  Stop Time: 0949  Time Calculation (min): 39 min    Insurance:  Visit number: 3 of MN  Authorization info: No auth  Insurance Type: Medicare  Medicare auth dates: 9/5/2024 - 12/4/2024     General:  Reason for visit: R shoulder pain (H/o R reverse TSA Feb 2023)  Referred by: Dr. Sebastian    Current Problem  1. Right shoulder pain, unspecified chronicity  Follow Up In Physical Therapy      2. Shoulder stiffness, right  Follow Up In Physical Therapy          Precautions: asthma, h/o R reverse total shoulder replacement in 1/2023 with revision due to dislocation in 2/2023, hiatal hernia 2015, patient is on Plavix (blood thinner), history of TIA May 2023, Alzheimers/vascular dementia       Subjective:   Patient reports his shoulder is gradually improving as he continues to perform his HEP regularly. Reports he has been adding in end range stretching on his own. At the end of the treatment session, patient reported a few very mild instances of dizziness. Reports he began tramadol a few days ago for his back pain; he feels that the tramadol lowers his blood pressure and heart rate, and can make him a little dizzy therefore only took 1/2 a pill this morning, and has not eaten breakfast.     Pain   0/10    Performing HEP?: Yes      Objective:   Shoulder hiking with shoulder flexion (able to correct with visual feedback from mirror)    Blood pressure at end of session: 106/78  Pulse: 68    Shoulder AROM (Degrees)                             (R)                    (L)  Flexion:            154                   176         Abduction:       150                   173                                 Functional ER:  76                     74                                   Functional IR:   47                     70           Treatment Performed:  - UBE x 3' fwd/back  -  cybex rows 5# 3 x 10  - cybex shoulder extension 2.5# 2 x 12   - shoulder ER- yellow tband 3 x 8  - cane flexion in standing 3 x 10 (with 3# weight attached)  - cane IR in standing x 20  - seated elbow flexion (4#) 2 x 12   - flex/scap/abd x 10 ea (mirror feedback)  - wrist flex/ext with 2# DB 2 x 10 each   - shoulder flexion stretch (ball up wall) x 20           HEP Access Code: [email or web address]   Exercises  - Standing Shoulder Row with Anchored Resistance  - 1 x daily - 7 x weekly - 2 sets - 10 reps  - Shoulder extension with resistance - Neutral  - 1 x daily - 7 x weekly - 2 sets - 10 reps  - Standing Isometric Shoulder Internal Rotation at Doorway  - 1 x daily - 7 x weekly - 2 sets - 10 reps - 5 second hold  - Standing Isometric Shoulder External Rotation with Doorway  - 1 x daily - 7 x weekly - 2 sets - 10 reps - 5 second hold  - Supine Shoulder Flexion Extension AAROM with Dowel  - 1 x daily - 7 x weekly - 2 sets - 10 reps - 5 second hold  - Standing Bilateral Shoulder Internal Rotation AAROM with Dowel  - 1 x daily - 7 x weekly - 2 sets - 10 reps - 5 second hold      Charges:  3- TE    Assessment:   Patient reported no dizziness or other similar symptoms upon sitting and drinking water at the end of the session. No other symptoms reported upon leaving the clinic. He reports an appropriate amount of shoulder soreness with the progression of today's exercises. Stressed importance of progressing strengthening exercises as opposed to pushing end range ROM exercises at home due to the limitations of his surgical procedure. Patient was in a stable condition with no symptoms upon leaving clinic. Encouraged patient to eat breakfast before PT sessions and upon returning home today. Patient should follow up with the prescribing MD for tramadol if he continues to experience low blood pressure.     Plan:  Monitor response to treatment and progress strengthening exercises as tolerated.      Dali Bridges, PT

## 2024-09-20 ENCOUNTER — APPOINTMENT (OUTPATIENT)
Dept: OTOLARYNGOLOGY | Facility: CLINIC | Age: 79
End: 2024-09-20
Payer: MEDICARE

## 2024-09-20 VITALS — BODY MASS INDEX: 25.71 KG/M2 | WEIGHT: 194 LBS | HEIGHT: 73 IN

## 2024-09-20 DIAGNOSIS — G47.33 OBSTRUCTIVE SLEEP APNEA: Primary | ICD-10-CM

## 2024-09-20 PROCEDURE — 1159F MED LIST DOCD IN RCRD: CPT | Performed by: OTOLARYNGOLOGY

## 2024-09-20 PROCEDURE — 1036F TOBACCO NON-USER: CPT | Performed by: OTOLARYNGOLOGY

## 2024-09-20 PROCEDURE — 99213 OFFICE O/P EST LOW 20 MIN: CPT | Performed by: OTOLARYNGOLOGY

## 2024-09-20 NOTE — PROGRESS NOTES
Chief Complaint   Patient presents with    Follow-up     EP- S/P INSPIRE      Date of Evaluation: 9/20/2024   HPI    Hank Rick is a 79 y.o. male for obstructive sleep apnea status post inspire January 2024.  He is thrilled with the result.  He is no longer snoring and is sleeping well.  He awakes refreshed.  He is scheduled for an HSAT.  He is working with Dr. Lopez.       Past Medical History:   Diagnosis Date    Arthritis     Asthma (HHS-HCC)     Cataract     COVID-19 11/15/2022    COVID-19 virus infection    Esophageal obstruction 05/15/2022    Esophageal stricture    Essential tremor 12/29/2022    Essential tremor    GERD (gastroesophageal reflux disease)     Hard of hearing     Hearing aid worn     Hyperlipidemia     Hypertension     Memory impairment     Olecranon bursitis, left elbow 04/26/2016    Olecranon bursitis of left elbow    Personal history of diseases of the blood and blood-forming organs and certain disorders involving the immune mechanism 05/17/2022    History of anemia    Personal history of other diseases of the digestive system 11/13/2014    History of gastroesophageal reflux (GERD)    Personal history of other diseases of the digestive system 01/14/2021    History of hiatal hernia    Personal history of other specified conditions 11/29/2018    History of urinary frequency    Rash and other nonspecific skin eruption 11/02/2021    Skin rash    Salmonella enteritis     Salmonella enteritis    Sleep apnea     TIA (transient ischemic attack)     Vertebral artery stenosis       Past Surgical History:   Procedure Laterality Date    BACK SURGERY      CATARACT EXTRACTION      CT ANGIO NECK  05/12/2023    CT NECK ANGIO W AND WO IV CONTRAST 5/12/2023 [facility]    CT HEAD ANGIO W AND WO IV CONTRAST  05/12/2023    CT HEAD ANGIO W AND WO IV CONTRAST 5/12/2023 [facility] CT    HERNIA REPAIR      MR HEAD ANGIO WO IV CONTRAST  05/09/2023    MR HEAD ANGIO WO IV CONTRAST 5/9/2023 [facility]    MR NECK ANGIO WO IV CONTRAST  " 05/09/2023    MR NECK ANGIO WO IV CONTRAST 5/9/2023 [facility]    OTHER SURGICAL HISTORY  01/07/2015    Esophagogastric Fundoplasty Laparoscopic For Paraesophageal Hernia Repair    OTHER SURGICAL HISTORY  03/05/2014    Shoulder Surgery Left    OTHER SURGICAL HISTORY      REFRACTIVE SURGERY  03/05/2014    Corneal LASIK    TONSILLECTOMY  11/18/2014    Tonsillectomy    TOTAL SHOULDER ARTHROPLASTY Right     reverse          Medications:   Current Outpatient Medications   Medication Instructions    acetaminophen (TYLENOL 8 HOUR) 1,300 mg, oral, Daily PRN, Do not crush, chew, or split.    albuterol 90 mcg/actuation inhaler Inhale 2 puffs every 4 to 6 hours as needed as directed    amLODIPine (NORVASC) 5 mg, oral, Daily, take 2 tablets by mouth once daily    aspirin 81 mg, oral, Daily, Please re start on 01/17/2024    atorvastatin (LIPITOR) 40 mg, oral, Daily    azelastine (Astelin) 137 mcg (0.1 %) nasal spray 2 sprays, Each Nostril, 2 times daily    clopidogrel (PLAVIX) 75 mg, oral, Daily    fexofenadine (Allegra) 180 mg tablet 1 tablet, oral, Daily    fluticasone (Flonase) 50 mcg/actuation nasal spray 1-2 sprays, Each Nostril, Daily    memantine (NAMENDA) 10 mg, oral, 2 times daily    montelukast (SINGULAIR) 10 mg, oral, Daily    omeprazole OTC (PriLOSEC OTC) 20 mg EC tablet 1 tablet, oral, Daily    propranolol LA (INDERAL LA) 60 mg, oral, Daily    sildenafil (VIAGRA)  mg, oral, As needed    topiramate (Topamax) 25 mg tablet TAKE 1-2 TABLETS AT BEDTIME    traZODone (DESYREL) 50 mg, oral, Nightly    vibegron 75 mg, oral, Daily        Allergies:  Allergies   Allergen Reactions    Gabapentin Anxiety    Pollen Extracts Other     Runny nose        Physical Exam:  Last Recorded Vitals  Height 1.854 m (6' 1\"), weight 88 kg (194 lb).  []General appearance: Well-developed, well-nourished in no acute distress, conversant with normal voice quality    Head/face: No erythema or edema or facial tenderness, and normal facial nerve " function bilaterally    External ear: Clear external auditory canals with normal pinnae  Tube status: N/A  Middle ear: Tympanic membranes intact and mobile, middle ears normal.  Tympanic membrane perforation: N/A  Mastoid bowl: N/A  Hearing: Normal conversational awareness at normal speech thresholds    Nose visualized using: Anterior rhinoscopy  Nasal dorsum: Nontraumatic midline appearance  Septum: Midline, nonobstructing  Inferior turbinates: Normal, pink  Secretions: Dry    Oral cavity and oropharynx: Normal  Teeth: Good condition  Floor of mouth: without lesions  Palate: Normal hard palate, soft palate and uvula  Oropharynx: Clear, no lesions present  Buccal mucosa: Normal without masses or lesions  Lips: Normal    Nasopharynx: Inadequate mirror exam secondary to gag/anatomy    Neck:  Salivary glands: Normal bilateral parotid and submandibular glands by inspection and palpation.  Non-thyroid masses: No palpable masses or significant lymphadenopathy  Trachea: Midline  Thyroid: No thyromegaly or palpable nodules  Temporomandibular joint: Nontender  Cervical range of motion: Normal    Neurologic exam: Alert and oriented x3, appropriate affect.  Cranial nerves II-XII normal bilaterally  Extraocular movement: Extraocular movement intact, normal gaze alignment    Incisions well healed    Hank was seen today for follow-up.  Diagnoses and all orders for this visit:  Obstructive sleep apnea (Primary)             PLAN  Continue working with Dr. Lopez.  Connie as needed    Ben Bob MD7

## 2024-09-24 ENCOUNTER — TREATMENT (OUTPATIENT)
Dept: PHYSICAL THERAPY | Facility: CLINIC | Age: 79
End: 2024-09-24
Payer: MEDICARE

## 2024-09-24 DIAGNOSIS — M25.511 RIGHT SHOULDER PAIN, UNSPECIFIED CHRONICITY: ICD-10-CM

## 2024-09-24 DIAGNOSIS — M25.611 SHOULDER STIFFNESS, RIGHT: ICD-10-CM

## 2024-09-24 PROCEDURE — 97110 THERAPEUTIC EXERCISES: CPT | Mod: GP

## 2024-09-24 NOTE — PROGRESS NOTES
Physical Therapy  Physical Therapy Treatment    Patient Name: Hank Rick  MRN: [id number]  Today's Date: 9/24/2024    Time Calculation  Start Time: 0730  Stop Time: 0810  Time Calculation (min): 40 min    Insurance:  Visit number: 4 of [place]  Authorization info: No auth  Insurance Type: Medicare  Medicare auth dates: 9/5/2024 - 12/4/2024     General:  Reason for visit: R shoulder pain (H/o R reverse TSA Feb 2023)  Referred by: Dr. Sebastian      Current Problem  1. Right shoulder pain, unspecified chronicity  Follow Up In Physical Therapy      2. Shoulder stiffness, right  Follow Up In Physical Therapy          Pain: 0/10    Subjective:   Subjective   Patient reports that he has been doing okay since last session.    HEP Compliance: Good    Objective:   Objective     Shoulder AROM (Degrees)                             (R)                    (L)  Flexion:            140                   176         Abduction:       150                   173                                 Functional ER:  76                     74                                   Functional IR:   47                     70           Treatments:     Therapeutic Exercise:                                 Shoulder flexion pulleys  Shoulder scaption pulleys  Shoulder IR AAROM w/ cane 2 x 20  Shoulder abduction eccentric w/ cane 2 x 20  Cybex rows 10# 2 x 20  Cybex pull downs 10# 2 x 20  Cybex triceps push downs 15# 2 x 20  Cybex biceps curls 10# 2 x 20  Seated scaption w/ 3# dbs 2 x 20  Seated Shoulder flexion w/ ER isometric w/ RTB 2 x 20  Seated shoulder ER w/ YTB 2 x 20  Seated modified shoulder press w/ 2# db 2 x 10    Charges: Therapeutic Exercise x 3    Assessment: The focus of the session was Strengthening, ROM, Stretching, and functional training. The pt demonstrated Good tolerance to the noted exercises today. The pt is demonstrated Fair progress in skilled rehab at this time. The pt is still limited in overall Strength, ROM, Flexibility, and Pain  at this time. The pt continues to be a good candidate for skilled PT, in order to further improve Strength, ROM, Flexibility, and Pain. Pt did not have any shortness of breath, dizziness during session. Performed some exercises in seated due to fatigue.         Plan: Continue to improve functional mobility, functional strength in order to increase participation in ADLs.         Bruno Walters, PT

## 2024-09-26 ENCOUNTER — TREATMENT (OUTPATIENT)
Dept: PHYSICAL THERAPY | Facility: CLINIC | Age: 79
End: 2024-09-26
Payer: MEDICARE

## 2024-09-26 DIAGNOSIS — M25.511 RIGHT SHOULDER PAIN, UNSPECIFIED CHRONICITY: Primary | ICD-10-CM

## 2024-09-26 DIAGNOSIS — J30.9 CHRONIC ALLERGIC RHINITIS: ICD-10-CM

## 2024-09-26 DIAGNOSIS — M25.611 SHOULDER STIFFNESS, RIGHT: ICD-10-CM

## 2024-09-26 PROCEDURE — 97110 THERAPEUTIC EXERCISES: CPT | Mod: GP

## 2024-09-26 RX ORDER — AZELASTINE 1 MG/ML
2 SPRAY, METERED NASAL 2 TIMES DAILY
Qty: 30 ML | Refills: 5 | Status: SHIPPED | OUTPATIENT
Start: 2024-09-26

## 2024-09-26 NOTE — PROGRESS NOTES
Physical Therapy  Physical Therapy Treatment    Patient Name: Hank Rick  MRN: [id number]  Today's Date: 9/26/2024    Time Calculation  Start Time: 0945  Stop Time: 1023  Time Calculation (min): 38 min    Insurance:  Visit number: 5 of [place]  Authorization info: No auth  Insurance Type: Medicare  Medicare auth dates: 9/5/2024 - 12/4/2024     General:  Reason for visit: R shoulder pain (H/o R reverse TSA Feb 2023)  Referred by: Dr. Sebastian      Current Problem  1. Right shoulder pain, unspecified chronicity  Follow Up In Physical Therapy      2. Shoulder stiffness, right  Follow Up In Physical Therapy          Pain: 0/10    Subjective:   Subjective   Patient reports that he has been doing well since last session. Notes no soreness after last session, even after also performing pilates and strength training independently.     HEP Compliance: Good    Objective:   Objective   Shoulder AROM WFL        Treatments:     Therapeutic Exercise:                                 Shoulder flexion wall slides 2 x 20  Shoulder abduction wall slides 2 x 15  Shoulder scaption wall slides 2 x 20  Shoulder horizontal abduction wall slides 2 x 20  Finger walking  Shoulder IR AAROM w/ cane 2 x 20  Shoulder IR w/ GTB 2 x 15   Shoulder ER w/ YTB 2 x 15   Ball on wall circles cw/ccw w/ 3# ball 2 x 20 each   Scaption w/ 2# ball 2 x 15   Bent over rows w/ 2# ball 2 x 20  Shoulder ER isometric 2 x 20 w/ 5 sec holds    Charges: Therapeutic Exercise x 3    Assessment: Continued with various strength, mobility exercises within tolerance. Pt was appropriately challenged with exercise progressions.       Plan: Continue with scapular, shoulder strengthening.        Bruno Walters, PT

## 2024-10-01 ENCOUNTER — TELEPHONE (OUTPATIENT)
Dept: PRIMARY CARE | Facility: CLINIC | Age: 79
End: 2024-10-01
Payer: MEDICARE

## 2024-10-01 DIAGNOSIS — M54.50 CHRONIC LOW BACK PAIN WITHOUT SCIATICA, UNSPECIFIED BACK PAIN LATERALITY: ICD-10-CM

## 2024-10-01 DIAGNOSIS — M54.2 CHRONIC NECK PAIN: Primary | ICD-10-CM

## 2024-10-01 DIAGNOSIS — G89.29 CHRONIC LOW BACK PAIN WITHOUT SCIATICA, UNSPECIFIED BACK PAIN LATERALITY: ICD-10-CM

## 2024-10-01 DIAGNOSIS — G89.29 CHRONIC NECK PAIN: Primary | ICD-10-CM

## 2024-10-01 RX ORDER — TRAMADOL HYDROCHLORIDE 50 MG/1
50 TABLET ORAL EVERY 8 HOURS PRN
Qty: 15 TABLET | Refills: 0 | Status: SHIPPED | OUTPATIENT
Start: 2024-10-01 | End: 2024-10-06

## 2024-10-01 NOTE — PROGRESS NOTES
Physical Therapy  Physical Therapy Treatment    Patient Name: Hank Rick  MRN: [id number]  Today's Date: 10/2/2024    Time Calculation  Start Time: 0832  Stop Time: 0913  Time Calculation (min): 41 min    Insurance:  Visit number: 6 of [place]  Authorization info: No auth  Insurance Type: Medicare  Medicare auth dates: 9/5/2024 - 12/4/2024     General:  Reason for visit: R shoulder pain (H/o R reverse TSA Feb 2023)  Referred by: Dr. Sebastian    Current Problem  1. Right shoulder pain, unspecified chronicity  Follow Up In Physical Therapy      2. Shoulder stiffness, right  Follow Up In Physical Therapy          Pain: 0/10    Subjective:   Subjective   Patient reports that he has been doing okay. Denies any current pain or soreness.    HEP Compliance: Fair    Objective:   Objective   SBA during standing exercises due to mild losses of balance        Treatments:     Therapeutic Exercise:                                 Serratus rolls with foam roller 2 x 20   Shoulder wall clocks w/ YTB 2 x 12  Shoulder flexion w/ ER isometric w/ YTB 2 x 15   Shoulder flexion cw/ccw ball on wall circles 2 x 20   Cybex rows w/ 17.5# 2 x 20  Cybex pull downs w/ 15# 2 x 20  Cybex high rows w/ 12.5# 2 x 20  Cybex ER walk outs w/ 2.5# 2 x 10   Cybex IR walk outs w/ 2.5# 2 x 10  Scaption w/ 2# dbs 2 x 15   Sit to stands w/ bicep curls w/ 2# dbs 2 x 10  Reviewed HEP  HEP Printout    Access Code: [email or web address]  URL: [email or web address]  Date: 10/02/2024  Prepared by: Bruno Walters    Exercises  - Scaption with Dumbbells  - 1 x daily - 7 x weekly - 2 sets - 20 reps  - Standing Bicep Curls Supinated with Dumbbells  - 1 x daily - 7 x weekly - 2 sets - 20 reps  - Shoulder Flexion Serratus Activation with Resistance  - 1 x daily - 7 x weekly - 2 sets - 20 reps  - Standing Wall Ball Circles with Mini Swiss Ball  - 1 x daily - 7 x weekly - 2 sets - 20 reps  - Wall Clock with Theraband  - 1 x daily - 7 x weekly - 2 sets - twice  around clock each arm hold  - Standing Shoulder Row with Anchored Resistance  - 1 x daily - 7 x weekly - 2 sets - 20 reps  - Shoulder Extension with Resistance  - 1 x daily - 7 x weekly - 2 sets - 20 reps    Charges: Therapeutic Exercise x 3    Assessment: Continued with various scapular, shoulder strengthening exercises within tolerance in order to improve overall functional ability. Pt continues to tolerate treatment well without complaints of pain. Educated pt on prognosis of continuing HEP independently to improve strength.       Plan: Progress toward discharge in two weeks with HEP to perform independently.        Bruno Walters, PT

## 2024-10-02 ENCOUNTER — TREATMENT (OUTPATIENT)
Dept: PHYSICAL THERAPY | Facility: CLINIC | Age: 79
End: 2024-10-02
Payer: MEDICARE

## 2024-10-02 DIAGNOSIS — M25.511 RIGHT SHOULDER PAIN, UNSPECIFIED CHRONICITY: Primary | ICD-10-CM

## 2024-10-02 DIAGNOSIS — M25.611 SHOULDER STIFFNESS, RIGHT: ICD-10-CM

## 2024-10-02 PROCEDURE — 97110 THERAPEUTIC EXERCISES: CPT | Mod: GP

## 2024-10-08 ENCOUNTER — TREATMENT (OUTPATIENT)
Dept: PHYSICAL THERAPY | Facility: CLINIC | Age: 79
End: 2024-10-08
Payer: MEDICARE

## 2024-10-08 ENCOUNTER — OFFICE VISIT (OUTPATIENT)
Dept: ORTHOPEDIC SURGERY | Facility: CLINIC | Age: 79
End: 2024-10-08
Payer: MEDICARE

## 2024-10-08 ENCOUNTER — HOSPITAL ENCOUNTER (OUTPATIENT)
Dept: RADIOLOGY | Facility: CLINIC | Age: 79
Discharge: HOME | End: 2024-10-08
Payer: MEDICARE

## 2024-10-08 DIAGNOSIS — M25.611 SHOULDER STIFFNESS, RIGHT: ICD-10-CM

## 2024-10-08 DIAGNOSIS — M48.061 SPINAL STENOSIS OF LUMBAR REGION, UNSPECIFIED WHETHER NEUROGENIC CLAUDICATION PRESENT: ICD-10-CM

## 2024-10-08 DIAGNOSIS — M25.511 RIGHT SHOULDER PAIN, UNSPECIFIED CHRONICITY: Primary | ICD-10-CM

## 2024-10-08 PROCEDURE — 1036F TOBACCO NON-USER: CPT | Performed by: ORTHOPAEDIC SURGERY

## 2024-10-08 PROCEDURE — 1159F MED LIST DOCD IN RCRD: CPT | Performed by: ORTHOPAEDIC SURGERY

## 2024-10-08 PROCEDURE — 97110 THERAPEUTIC EXERCISES: CPT | Mod: GP

## 2024-10-08 PROCEDURE — 72082 X-RAY EXAM ENTIRE SPI 2/3 VW: CPT | Performed by: RADIOLOGY

## 2024-10-08 PROCEDURE — 72082 X-RAY EXAM ENTIRE SPI 2/3 VW: CPT

## 2024-10-08 PROCEDURE — 99214 OFFICE O/P EST MOD 30 MIN: CPT | Performed by: ORTHOPAEDIC SURGERY

## 2024-10-08 NOTE — PROGRESS NOTES
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    He has ongoing back and bilateral leg pain.  He can walk about three quarters of a mile.  His pain is controlled in the morning with Tylenol and tramadol.    He is quite active and stretch lab and Pilates.    On exam, he has a slightly kyphotic posture.  His gait is stable.  His strength is intact.    We reviewed his imaging.  He does have stenosis at L3-4 and L4-5 and moderate stenosis up at L2-3.    Impression: He is symptomatic with back pain and some degree of lumbar radiculopathy.    He and his wife and I have discussed his situation at length.  Further surgery would be a significant undertaking.  It would require a revision decompression, removal of his interspinous spacers, and an instrumented fusion, at least from L2-5.  He is wondering if he could have a decompression only.  I would be very concerned about the possibility of postlaminectomy instability.    He and his wife are going to consider things and let us know if he would like to proceed.    ** Dictated with voice recognition software and not immediately reviewed for errors in grammar and/or spelling **

## 2024-10-08 NOTE — LETTER
October 8, 2024     Sonu Larose MD  [address]  [address]  [facility] [address]    Patient: Hank Rick   YOB: 1945   Date of Visit: 10/8/2024       Dear Dr. Sonu Larose MD:    Thank you for referring Hank Rick to me for evaluation. Below are my notes for this consultation.  If you have questions, please do not hesitate to call me. I look forward to following your patient along with you.       Sincerely,     Shad Sanon MD      CC: No Recipients  ______________________________________________________________________________________    Edmar LemonsMerline Returns.  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    He has ongoing back and bilateral leg pain.  He can walk about three quarters of a mile.  His pain is controlled in the morning with Tylenol and tramadol.    He is quite active and stretch lab and Pilates.    On exam, he has a slightly kyphotic posture.  His gait is stable.  His strength is intact.    We reviewed his imaging.  He does have stenosis at L3-4 and L4-5 and moderate stenosis up at L2-3.    Impression: He is symptomatic with back pain and some degree of lumbar radiculopathy.    He and his wife and I have discussed his situation at length.  Further surgery would be a significant undertaking.  It would require a revision decompression, removal of his interspinous spacers, and an instrumented fusion, at least from L2-5.  He is wondering if he could have a decompression only.  I would be very concerned about the possibility of postlaminectomy instability.    He and his wife are going to consider things and let us know if he would like to proceed.    ** Dictated with voice recognition software and not immediately reviewed for errors in grammar and/or spelling **

## 2024-10-08 NOTE — PROGRESS NOTES
Physical Therapy  Physical Therapy Treatment    Patient Name: Hank Rick  MRN: [id number]  Today's Date: 10/8/2024    Time Calculation  Start Time: 1115  Stop Time: 1153  Time Calculation (min): 38 min      Insurance:  Visit number: 7 of [place]  Authorization info: No auth  Insurance Type: Medicare  Medicare auth dates: 9/5/2024 - 12/4/2024     General:  Reason for visit: R shoulder pain (H/o R reverse TSA Feb 2023)  Referred by: Dr. Sebastian      Current Problem  1. Right shoulder pain, unspecified chronicity  Follow Up In Physical Therapy      2. Shoulder stiffness, right  Follow Up In Physical Therapy          Pain: 0/10    Subjective:   Subjective   Patient reports that he feels shoulder has been improving.     HEP Compliance: Good    Objective:   Objective   SBA during standing exercises         Treatments:     Therapeutic Exercise:                                 UBE F/R x 3 min each  Shoulder front raises w/ 2# ball 2 x 10  Shoulder scaption w/ 2# ball 2 x 10  Shoulder lateral raises w/ 2# ball 2 x 10  Shoulder flexion ball on wall cw/ccw circles 2 x 20  Shoulder abduction ball on wall cw/ccw circles 2 x 20  Shoulder horizontal abduction w/ RTB 2 x 15   Serratus slides w/ RTB 2 x 15  Foam roller slides 2 x 20  Cybex single arm rows w/ 5# 2 x 15   Cybex single arm pull downs w/ 5# 2 x 15   Triceps pushdowns w/ 10# 2 x 15   Bicep curls w/ 10# 2 x 15   Reviewed HEP    Access Code: [email or web address]    Charges: Therapeutic Exercise x 3    Assessment: Continued with shoulder, scapular strengthening exercises within pain tolerance. Pt did not have any pain and had mild difficulty with exercises prescribed. Pt is nearing PLOF.       Plan: Discharge by end of month with independent HEP.       Bruno Walters, PT

## 2024-10-10 ENCOUNTER — APPOINTMENT (OUTPATIENT)
Dept: PHYSICAL THERAPY | Facility: CLINIC | Age: 79
End: 2024-10-10
Payer: MEDICARE

## 2024-10-10 DIAGNOSIS — J45.909 ASTHMA, UNSPECIFIED ASTHMA SEVERITY, UNSPECIFIED WHETHER COMPLICATED, UNSPECIFIED WHETHER PERSISTENT (HHS-HCC): ICD-10-CM

## 2024-10-10 RX ORDER — ALBUTEROL SULFATE 90 UG/1
INHALANT RESPIRATORY (INHALATION)
Qty: 51 G | Refills: 1 | Status: SHIPPED | OUTPATIENT
Start: 2024-10-10

## 2024-10-14 ENCOUNTER — HOSPITAL ENCOUNTER (OUTPATIENT)
Dept: RADIOLOGY | Facility: HOSPITAL | Age: 79
Discharge: HOME | End: 2024-10-14
Payer: MEDICARE

## 2024-10-14 DIAGNOSIS — M48.061 SPINAL STENOSIS OF LUMBAR REGION, UNSPECIFIED WHETHER NEUROGENIC CLAUDICATION PRESENT: ICD-10-CM

## 2024-10-14 ASSESSMENT — ENCOUNTER SYMPTOMS: CONSTITUTIONAL NEGATIVE: 1

## 2024-10-14 NOTE — PROGRESS NOTES
Patient ID: Hank Rick is a 79 y.o. male who presents for Follow-up.    /57   Pulse 56   Wt 89.4 kg (197 lb)   SpO2 94%   BMI 25.99 kg/m²     HPI      HTN ON MEDICATIONS CONTROLLED   NO CP, NO SOB , NO PND , NO ORTHOPNEA  NO LEG SWELLING , NO PALPITATION , NO HEADACHE         OBSTRUCTIVE SLEEP APNEA   USING CPAP WORKING       HYPERLIPIDEMIA   ON STATIN       BARRETTES ESOPHAGUS   PER EGD DONE ON 05/17/2022  NO DYSPHAGIA , NO WEIGHT LOSS       VERTEBRAL ARTERY STENOSIS /OCCLUSION LEFT   STABLE ON CLOPIDOGREL     VERTEBROBASILAR ARTERY INSUFFICIENCY       PT HAVING SEVERE PAIN WITH HIS BACK   UNABLE TO FUNCTION   PAIN SCALE 7-8/10   TRAMADOL 50MG HALF TABLET   HELPS ALONG WITH TYLENOL 650MG   THOUGH HE IS ONLY TAKING 1-2 TYLENOL NEEDED   NOT TAKING TOPAMAX   NO EXTREMITY WEAKNESS   NO BLADDER OR BOWEL INCONTINENCE     NON SMOKER   NO HX OF ILLICIT DRUG USE     USES ALCOHOL SOCIALLY     PT SEEN DR SARAH CASTILLO   SPINE ORTHO RECOMMENDED   LAMINECTOMY , SPACER REMOVAL AND LUMBAR DISC FUSION L2-L5         Subjective     Review of Systems   Constitutional: Negative.    All other systems reviewed and are negative.      Objective     Physical Exam  Vitals and nursing note reviewed.   Neck:      Vascular: No carotid bruit.   Cardiovascular:      Rate and Rhythm: Normal rate and regular rhythm.      Pulses: Normal pulses.      Heart sounds: Normal heart sounds. No murmur heard.  Pulmonary:      Effort: Pulmonary effort is normal.      Breath sounds: Normal breath sounds.   Abdominal:      Palpations: There is no mass.   Musculoskeletal:      Right lower leg: No edema.      Left lower leg: No edema.      Comments: LUMBAR FLEXION /EXTENSION PAINFUL   LIMITED EXCURSION   NO L.S TENDERNESS   Skin:     Capillary Refill: Capillary refill takes more than 3 seconds.   Neurological:      General: No focal deficit present.      Mental Status: He is oriented to person, place, and time. Mental status is at baseline.    Psychiatric:         Mood and Affect: Mood normal.         Behavior: Behavior normal.         Thought Content: Thought content normal.         Judgment: Judgment normal.         Lab Results   Component Value Date    WBC 6.9 04/19/2024    HGB 12.4 (L) 04/19/2024    HCT 39.2 (L) 04/19/2024    MCV 97 04/19/2024     04/19/2024           Problem List Items Addressed This Visit       Vertebrobasilar artery insufficiency    Vertebral artery stenosis/occlusion, left    Primary hypertension - Primary    Rodriguez's esophagus    Lumbar stenosis with neurogenic claudication    Tremor    Hyperglycemia    Relevant Orders    Hemoglobin A1c    Hyperlipidemia    Relevant Orders    Lipid panel    Hypoalbuminemia    Mixed Alzheimer's and vascular dementia (Multi)    WALKER (obstructive sleep apnea)    Chronic pain syndrome     Other Visit Diagnoses       Screening for prostate cancer        Relevant Orders    Prostate Spec.Ag,Screen              A/P       TOPAMAX 50MG 1 TAB BID   METHOCARBAMOL 500MG 1 TAB THREE TIMES A DAY   TRAMADOL 50MG HALF TAB NEEDED FOR PAIN QNTY 45   OARRS REVIEWED NO RED FLAG   OPIOID NOTE FORM COMPLETED   URINE DRUG SCREENING TODAY         OARRS:  Sonu Larose MD on 10/15/2024  3:27 PM  I have personally reviewed the OARRS report for Hank Rick. I have considered the risks of abuse, dependence, addiction and diversion and I believe that it is clinically appropriate for Hank Rick to be prescribed this medication    Is the patient prescribed a combination of a benzodiazepine and opioid?  No    Last Urine Drug Screen / ordered today: Yes  No results found for this or any previous visit (from the past 8760 hour(s)).  Results are as expected.         Controlled Substance Agreement:  Date of the Last Agreement: 10/15/2024  Reviewed Controlled Substance Agreement including but not limited to the benefits, risks, and alternatives to treatment with a Controlled Substance  medication(s).    Opioids:  What is the patient's goal of therapy? TO HELP WITH PAIN   Is this being achieved with current treatment? YES    I have calculated the patient's Morphine Dose Equivalent (MED):   I have considered referral to Pain Management and/or a specialist, and do not feel it is necessary at this time.    I feel that it is clinically indicated to continue this current medication regimen after consideration of alternative therapies, and other non-opioid treatment.    Opioid Risk Screening:  No data recorded    Pain Assessment:  No data recorded    Advance Care Planning Note     Discussion Date: 10/15/24   Discussion Participants: patient    The patient wishes to discuss Advance Care Planning today and the following is a brief summary of our discussion.     Patient has capacity to make their own medical decisions: Yes  Health Care Agent/Surrogate Decision Maker documented in chart: Yes    Documents on file and valid:  Advance Directive/Living Will: No   Health Care Power of : No  Other:     Communication of Medical Status/Prognosis:        Communication of Treatment Goals/Options:      Discussed with the patient end-of-life choices patient is presently   DNR he does not have a living will or healthcare power of  he will think about getting it done will bring it back on next office visit whenever it is done so that I can scan it into the chart for the purpose of documentation    Treatment Decisions  Goals of Care: quality of life is prioritized; willing to accept low-burden treatments to achieve meaningful goals       Follow Up Plan    Team Members    Time Statement: Total face to face time spent on advance care planning was 5 minutes with 5 minutes spent in counseling, including the explanation.    Sonu Larose MD  10/15/2024 2:58 PM

## 2024-10-15 ENCOUNTER — APPOINTMENT (OUTPATIENT)
Dept: PRIMARY CARE | Facility: CLINIC | Age: 79
End: 2024-10-15
Payer: MEDICARE

## 2024-10-15 ENCOUNTER — TREATMENT (OUTPATIENT)
Dept: PHYSICAL THERAPY | Facility: CLINIC | Age: 79
End: 2024-10-15
Payer: MEDICARE

## 2024-10-15 ENCOUNTER — LAB (OUTPATIENT)
Dept: LAB | Facility: LAB | Age: 79
End: 2024-10-15
Payer: COMMERCIAL

## 2024-10-15 VITALS
DIASTOLIC BLOOD PRESSURE: 57 MMHG | WEIGHT: 197 LBS | SYSTOLIC BLOOD PRESSURE: 107 MMHG | BODY MASS INDEX: 25.99 KG/M2 | HEART RATE: 56 BPM | OXYGEN SATURATION: 94 %

## 2024-10-15 DIAGNOSIS — M62.830 LUMBAR PARASPINAL MUSCLE SPASM: ICD-10-CM

## 2024-10-15 DIAGNOSIS — F11.90 OPIOID USE: ICD-10-CM

## 2024-10-15 DIAGNOSIS — M25.511 RIGHT SHOULDER PAIN, UNSPECIFIED CHRONICITY: ICD-10-CM

## 2024-10-15 DIAGNOSIS — E88.09 HYPOALBUMINEMIA: ICD-10-CM

## 2024-10-15 DIAGNOSIS — I65.02 VERTEBRAL ARTERY STENOSIS/OCCLUSION, LEFT: ICD-10-CM

## 2024-10-15 DIAGNOSIS — R73.9 HYPERGLYCEMIA: ICD-10-CM

## 2024-10-15 DIAGNOSIS — Z12.5 SCREENING FOR PROSTATE CANCER: ICD-10-CM

## 2024-10-15 DIAGNOSIS — F01.50 MIXED ALZHEIMER'S AND VASCULAR DEMENTIA (MULTI): ICD-10-CM

## 2024-10-15 DIAGNOSIS — F02.80 MIXED ALZHEIMER'S AND VASCULAR DEMENTIA (MULTI): ICD-10-CM

## 2024-10-15 DIAGNOSIS — G89.4 CHRONIC PAIN SYNDROME: ICD-10-CM

## 2024-10-15 DIAGNOSIS — G47.33 OSA (OBSTRUCTIVE SLEEP APNEA): ICD-10-CM

## 2024-10-15 DIAGNOSIS — G45.0 VERTEBROBASILAR ARTERY INSUFFICIENCY: ICD-10-CM

## 2024-10-15 DIAGNOSIS — M25.611 SHOULDER STIFFNESS, RIGHT: ICD-10-CM

## 2024-10-15 DIAGNOSIS — I10 PRIMARY HYPERTENSION: Primary | Chronic | ICD-10-CM

## 2024-10-15 DIAGNOSIS — M48.062 LUMBAR STENOSIS WITH NEUROGENIC CLAUDICATION: ICD-10-CM

## 2024-10-15 DIAGNOSIS — E78.5 HYPERLIPIDEMIA, UNSPECIFIED HYPERLIPIDEMIA TYPE: ICD-10-CM

## 2024-10-15 DIAGNOSIS — K22.70 BARRETT'S ESOPHAGUS WITHOUT DYSPLASIA: ICD-10-CM

## 2024-10-15 DIAGNOSIS — G30.9 MIXED ALZHEIMER'S AND VASCULAR DEMENTIA (MULTI): ICD-10-CM

## 2024-10-15 DIAGNOSIS — R25.1 TREMOR: ICD-10-CM

## 2024-10-15 LAB
AMPHETAMINES UR QL SCN: NORMAL
BARBITURATES UR QL SCN: NORMAL
BZE UR QL SCN: NORMAL
CANNABINOIDS UR QL SCN: NORMAL
CHOLEST SERPL-MCNC: 133 MG/DL (ref 0–199)
CHOLESTEROL/HDL RATIO: 2.7
CREAT UR-MCNC: 54.8 MG/DL (ref 20–370)
EST. AVERAGE GLUCOSE BLD GHB EST-MCNC: 114 MG/DL
HBA1C MFR BLD: 5.6 %
HDLC SERPL-MCNC: 48.5 MG/DL
LDLC SERPL CALC-MCNC: 66 MG/DL
NON HDL CHOLESTEROL: 85 MG/DL (ref 0–149)
PCP UR QL SCN: NORMAL
PSA SERPL-MCNC: 0.47 NG/ML
TRIGL SERPL-MCNC: 91 MG/DL (ref 0–149)
VLDL: 18 MG/DL (ref 0–40)

## 2024-10-15 PROCEDURE — 36415 COLL VENOUS BLD VENIPUNCTURE: CPT

## 2024-10-15 PROCEDURE — 80358 DRUG SCREENING METHADONE: CPT

## 2024-10-15 PROCEDURE — 82570 ASSAY OF URINE CREATININE: CPT

## 2024-10-15 PROCEDURE — 83036 HEMOGLOBIN GLYCOSYLATED A1C: CPT

## 2024-10-15 PROCEDURE — 80061 LIPID PANEL: CPT

## 2024-10-15 PROCEDURE — 80307 DRUG TEST PRSMV CHEM ANLYZR: CPT

## 2024-10-15 PROCEDURE — 80368 SEDATIVE HYPNOTICS: CPT

## 2024-10-15 PROCEDURE — 80346 BENZODIAZEPINES1-12: CPT

## 2024-10-15 PROCEDURE — 80361 OPIATES 1 OR MORE: CPT

## 2024-10-15 PROCEDURE — 80365 DRUG SCREENING OXYCODONE: CPT

## 2024-10-15 PROCEDURE — 80373 DRUG SCREENING TRAMADOL: CPT

## 2024-10-15 PROCEDURE — 97110 THERAPEUTIC EXERCISES: CPT | Mod: GP | Performed by: PHYSICAL THERAPIST

## 2024-10-15 PROCEDURE — 80354 DRUG SCREENING FENTANYL: CPT

## 2024-10-15 PROCEDURE — G0103 PSA SCREENING: HCPCS

## 2024-10-15 RX ORDER — NALOXONE HYDROCHLORIDE 4 MG/.1ML
1 SPRAY NASAL AS NEEDED
Qty: 2 EACH | Refills: 0 | Status: SHIPPED | OUTPATIENT
Start: 2024-10-15

## 2024-10-15 RX ORDER — TOPIRAMATE 25 MG/1
25 TABLET ORAL 2 TIMES DAILY
Qty: 60 TABLET | Refills: 2 | Status: SHIPPED | OUTPATIENT
Start: 2024-10-15 | End: 2025-10-15

## 2024-10-15 RX ORDER — TRAMADOL HYDROCHLORIDE 50 MG/1
50 TABLET ORAL EVERY 8 HOURS PRN
Qty: 15 TABLET | Refills: 0 | Status: SHIPPED | OUTPATIENT
Start: 2024-10-15 | End: 2024-11-14

## 2024-10-15 RX ORDER — METHOCARBAMOL 500 MG/1
500 TABLET, FILM COATED ORAL 3 TIMES DAILY
Qty: 45 TABLET | Refills: 0 | Status: SHIPPED | OUTPATIENT
Start: 2024-10-15 | End: 2024-10-30

## 2024-10-15 ASSESSMENT — ENCOUNTER SYMPTOMS
DEPRESSION: 0
LOSS OF SENSATION IN FEET: 0
OCCASIONAL FEELINGS OF UNSTEADINESS: 0

## 2024-10-15 ASSESSMENT — PATIENT HEALTH QUESTIONNAIRE - PHQ9
2. FEELING DOWN, DEPRESSED OR HOPELESS: NOT AT ALL
1. LITTLE INTEREST OR PLEASURE IN DOING THINGS: NOT AT ALL
SUM OF ALL RESPONSES TO PHQ9 QUESTIONS 1 AND 2: 0

## 2024-10-15 NOTE — PROGRESS NOTES
Physical Therapy  Physical Therapy Treatment    Patient Name: Hank Rick  MRN: [id number]  Today's Date: 10/15/2024    Time Calculation  Start Time: 0737  Stop Time: 0815  Time Calculation (min): 38 min      Insurance:  Visit number: 8 of MN  Authorization info: No auth  Insurance Type: Medicare  Medicare auth dates: 9/5/2024 - 12/4/2024     General:  Reason for visit: R shoulder pain (H/o R reverse TSA Feb 2023)  Referred by: Dr. Sebastian      Current Problem  1. Right shoulder pain, unspecified chronicity  Follow Up In Physical Therapy      2. Shoulder stiffness, right  Follow Up In Physical Therapy          Pain: 0/10    Subjective:   Subjective   Patient reports that he feels shoulder has been improving. He has minimal shoulder pain (2/10), but his back pain is the worst. He is scheduled to undergo a lumbar MRI tomorrow.     HEP Compliance: Good    Objective:   Objective   SBA during standing exercises         Treatments:     Therapeutic Exercise:                                 - UBE F/R x 3 min each  - Shoulder front raises w/ 2# ball 2 x 10  - Shoulder scaption w/ 2# ball 2 x 10  - Shoulder lateral raises w/ 2# ball 2 x 10  - Shoulder flexion ball on wall cw/ccw circles 2 x 20  - Shoulder abduction ball on wall cw/ccw circles 2 x 20  - Shoulder horizontal abduction w/ RTB 2 x 15   - Serratus slides w/ RTB 2 x 15  - Foam roller slides 2 x 20  - Cybex single arm rows w/ 5# 2 x 15   - Cybex single arm pull downs w/ 5# 2 x 15   - Triceps pushdowns w/ 10# 2 x 15   - Bicep curls w/ 10# 2 x 15       Access Code: [email or web address]    Charges: Therapeutic Exercise x 3    Assessment:    Patient should be appropriate for discharge next visit. He requires minimal verbal cueing, is able to perform all of his HEP exercises independently, and has minimal to no functional limitations in regards to his shoulder.     Plan: Recheck with discharge likely next visit.        Dali Bridges, PT

## 2024-10-16 ENCOUNTER — HOSPITAL ENCOUNTER (OUTPATIENT)
Dept: RADIOLOGY | Facility: HOSPITAL | Age: 79
Discharge: HOME | End: 2024-10-16
Payer: MEDICARE

## 2024-10-16 ENCOUNTER — CLINICAL SUPPORT (OUTPATIENT)
Dept: SLEEP MEDICINE | Facility: CLINIC | Age: 79
End: 2024-10-16
Payer: MEDICARE

## 2024-10-16 DIAGNOSIS — G47.30 SLEEP APNEA, UNSPECIFIED TYPE: ICD-10-CM

## 2024-10-16 DIAGNOSIS — G47.33 OBSTRUCTIVE SLEEP APNEA (ADULT) (PEDIATRIC): ICD-10-CM

## 2024-10-16 PROCEDURE — 72148 MRI LUMBAR SPINE W/O DYE: CPT

## 2024-10-16 PROCEDURE — 72148 MRI LUMBAR SPINE W/O DYE: CPT | Performed by: RADIOLOGY

## 2024-10-16 PROCEDURE — 95806 SLEEP STUDY UNATT&RESP EFFT: CPT | Performed by: STUDENT IN AN ORGANIZED HEALTH CARE EDUCATION/TRAINING PROGRAM

## 2024-10-16 NOTE — PROGRESS NOTES
Type of Study: HOME SLEEP STUDY - NOMAD     The patient received equipment and instructions for use of the [facility] Nomad HSAT device. The patient was instructed how to apply the effort belts, cannula, thermistor. It was also explained how the Nomad and oximeter components work.  The patient was asked to record their sleep for at least a 6 hour period.     The patient was informed of their responsibility for the device and acknowledged this by signing the HSAT device contract. The patient was asked to return the device on 10/17/2024 between the hours of 8am to 3pm to the [facility] at [facility].     The patient was instructed to call 911 as usual for any medical- emergencies while at home.  The patient was also given a phone number for troubleshooting when using the device in case there were additional questions.

## 2024-10-17 ENCOUNTER — APPOINTMENT (OUTPATIENT)
Dept: INTEGRATIVE MEDICINE | Facility: CLINIC | Age: 79
End: 2024-10-17
Payer: MEDICARE

## 2024-10-17 ENCOUNTER — TREATMENT (OUTPATIENT)
Dept: PHYSICAL THERAPY | Facility: CLINIC | Age: 79
End: 2024-10-17
Payer: MEDICARE

## 2024-10-17 DIAGNOSIS — M25.511 RIGHT SHOULDER PAIN, UNSPECIFIED CHRONICITY: ICD-10-CM

## 2024-10-17 DIAGNOSIS — M54.59 OTHER LOW BACK PAIN: Primary | ICD-10-CM

## 2024-10-17 DIAGNOSIS — M25.611 SHOULDER STIFFNESS, RIGHT: ICD-10-CM

## 2024-10-17 PROCEDURE — 97110 THERAPEUTIC EXERCISES: CPT | Mod: GP | Performed by: PHYSICAL THERAPIST

## 2024-10-17 PROCEDURE — 97535 SELF CARE MNGMENT TRAINING: CPT | Mod: GP | Performed by: PHYSICAL THERAPIST

## 2024-10-17 NOTE — PROGRESS NOTES
Physical Therapy  Physical Therapy Treatment    Patient Name: Hank Rick  MRN: [id number]  Today's Date: 10/17/2024  Time Calculation  Start Time: 1505  Stop Time: 1529  Time Calculation (min): 24 min      Insurance:  Visit number: 9 of MN  Authorization info: No auth  Insurance Type: Medicare  Medicare auth dates: 9/5/2024 - 12/4/2024     General:  Reason for visit: R shoulder pain (H/o R reverse TSA Feb 2023)  Referred by: Dr. Sebastian      Current Problem  1. Right shoulder pain, unspecified chronicity  Follow Up In Physical Therapy      2. Shoulder stiffness, right  Follow Up In Physical Therapy          Pain: 0/10    Subjective:   Subjective   Patient reports he has made significant progress in his shoulder of the past few months. There are a few movements like reaching behind his back that can be difficult, but he understands that this may be due to the type of surgery he had. He does also notice some weakness in external rotation, but understands the exercises necessary to improve this weakness.     % of Prior Level of Function (PLOF): 90% (previously stated 75% at [facility]).     HEP Compliance: Good    Objective:   Objective    ROM     Shoulder AROM (Degrees)                             (R)                    (L)  Flexion:            160                   176         Abduction:       153                   173                                 Functional ER:  76                     74                                   Functional IR:   52                     70                                                                             Elbow AROM (Degrees)  WNL                                     Strength Testing     Shoulder                          (R)                    (L)  Flexion:            5-/5                   5/5                                             Abduction:       4/5                  5/5                                 ER:                    3+/5                   5/5                                  IR:                     3+/5                   5/5                                       Elbow                          (R)                    (L)  Flexion:            4/5                 5/5                                                         Extension:        4/5                 5/5                                       Posture: R shoulder elevated at rest, forward head         Radicular Symptoms: none     Outcome Measures:  Other Measures  Disability of Arm Shoulder Hand (DASH):   Eval: 45.45   DC: 22.73         Treatments:     Therapeutic Exercise:                                 - recheck  - objective measures   - reviewed HEP      Access Code: [email or web address]    Charges:  1- Self care  1- TE    Assessment:   Patient has made excellent progress towards his goals as demonstrated by an improvement in ROM, strength, and overall function. Patient has minimal to no functional limitations in regards to his shoulder at this time therefore is appropriate for discharge to a [facility].     Goals:  In 4 weeks, patient will demonstrate improved shoulder flexion ROM to at least 165  degrees in order to reach over head into tall cabinets without difficulty. - ROM- progressed towards 165, functionally, GOAL MET      In 4 weeks, patient will demonstrate improved L shoulder IR ROM to at least 60 degrees in order to wash his back. - progressed towards goal; primarily utilizes left hand      In 4 weeks, patient will demonstrate independence with UE strengthening exercises in order to improve UE strength.- GOAL MET      In 4 weeks, patient will demonstrate improved R shoulder endurance in order to hold his arm up to shave without difficulty. - GOAL MET      By discharge, patient will improve QuickDASH score by 8.  - GOAL MET      By discharge, patient will be independent with a final HEP.  - GOAL MET         Plan: Discharge to [facility].       Dali Bridges, PT

## 2024-10-17 NOTE — PATIENT INSTRUCTIONS
Pt tolerated tx well.  Immediate pt reported benefits of decreased pain.  Advised to engage in gentle activity following tx and hydrate.  Schedule 4-6 follow tx with re-evaluation

## 2024-10-17 NOTE — PROGRESS NOTES
Acupuncture Visit:     Subjective   Patient ID: aHnk Rick is a 79 y.o. male who presents for No chief complaint on file.  PT seeking support for low back pain.  He is taking Rx to take edge of pain.  Recent results from MRI showed increased degenration in L5/S1.  He prefers to lay on back and/or side.  Standing and walking without assistance trigger pain.  He can walk over a mile with assistance.  Sitting is fine but moving from sitting to standing is difficult.  He does experience some numbness of right side to knee.  Pain is described as dull constant ache.  The pain has migrated to hips instead of low back.          Session Information  Is this acupuncture treatment being billed to the patient's insurance company: Yes  This is visit number: 1  The patient has a total number of visits of: 12  Initial Acupuncture Treatment date: 10/17/24  Last Treatment date: 01/15/25  Name of Insurance Company: Medicare A/B  Name of Supervising Physician: Tashi  Visit Type: Follow-up visit         Review of Systems         Provider reviewed plan for the acupuncture session, precautions and contraindications. Patient/guardian/hospital staff has given consent to treat with full understanding of what to expect during the session. Before acupuncture began, provider explained to the patient to communicate at any time if the procedure was causing discomfort past their tolerance level. Patient agreed to advise acupuncturist. The acupuncturist counseled the patient on the risks of acupuncture treatment including pain, infection, bleeding, and no relief of pain. The patient was positioned comfortably. There was no evidence of infection at the site of needle insertions.    Objective   Physical Exam         Treatment Plan  Treatment Goals: Pain management    Acupuncture Treatment  Patient Position: Supine on a table, Lateral recumbent on a table  Needle Guage: 40 guage /.16/ Red seirin  Body Points: Without retention, With  retention  Body Points - Left: li4, ub62  Body Points - Bilateral: k 3, sp 4, 6, k 27  Body Points - Right: si3, lr3, gb 38. gb 30, km adrenal x2  Other Techniques Utilized: TDP Lamp, Topicals  Topicals Description: warming lotion with ravensara eo  Needle Count In: 17  Needle Count Out: 17  Needle Retention Time (min): 25 minutes  Total Face to Face Time (min): 30 minutes              Assessment/Plan

## 2024-10-18 LAB
1OH-MIDAZOLAM UR CFM-MCNC: <25 NG/ML
6MAM UR CFM-MCNC: <25 NG/ML
7AMINOCLONAZEPAM UR CFM-MCNC: <25 NG/ML
A-OH ALPRAZ UR CFM-MCNC: <25 NG/ML
ALPRAZ UR CFM-MCNC: <25 NG/ML
CHLORDIAZEP UR CFM-MCNC: <25 NG/ML
CLONAZEPAM UR CFM-MCNC: <25 NG/ML
CODEINE UR CFM-MCNC: <50 NG/ML
DIAZEPAM UR CFM-MCNC: <25 NG/ML
EDDP UR CFM-MCNC: <25 NG/ML
FENTANYL UR CFM-MCNC: <2.5 NG/ML
HYDROCODONE CTO UR CFM-MCNC: <25 NG/ML
HYDROMORPHONE UR CFM-MCNC: <25 NG/ML
LORAZEPAM UR CFM-MCNC: <25 NG/ML
METHADONE UR CFM-MCNC: <25 NG/ML
MIDAZOLAM UR CFM-MCNC: <25 NG/ML
MORPHINE UR CFM-MCNC: <50 NG/ML
NORDIAZEPAM UR CFM-MCNC: <25 NG/ML
NORFENTANYL UR CFM-MCNC: <2.5 NG/ML
NORHYDROCODONE UR CFM-MCNC: <25 NG/ML
NOROXYCODONE UR CFM-MCNC: <25 NG/ML
NORTRAMADOL UR-MCNC: >1000 NG/ML
OXAZEPAM UR CFM-MCNC: <25 NG/ML
OXYCODONE UR CFM-MCNC: <25 NG/ML
OXYMORPHONE UR CFM-MCNC: <25 NG/ML
TEMAZEPAM UR CFM-MCNC: <25 NG/ML
TRAMADOL UR CFM-MCNC: >1000 NG/ML
ZOLPIDEM UR CFM-MCNC: <25 NG/ML
ZOLPIDEM UR-MCNC: <25 NG/ML

## 2024-10-22 ENCOUNTER — TELEPHONE (OUTPATIENT)
Dept: PRIMARY CARE | Facility: CLINIC | Age: 79
End: 2024-10-22
Payer: MEDICARE

## 2024-10-22 DIAGNOSIS — R26.2 INABILITY TO WALK: Primary | ICD-10-CM

## 2024-10-24 ENCOUNTER — DOCUMENTATION (OUTPATIENT)
Dept: ORTHOPEDIC SURGERY | Facility: HOSPITAL | Age: 79
End: 2024-10-24
Payer: MEDICARE

## 2024-10-24 NOTE — PROGRESS NOTES
I spoke with Hank. I reviewed his MRI which does show ongoing stenosis at each level LIII four, L4 five and L5 S1. At L5 S1 there is a synovial cyst emanating from the facet joint contributing to significant central and lateral recess stenosis.    Again he is seeking out a second opinion at the [facility], at my recommendation.    Okay me updated on his progress.    ** Dictated with voice recognition software and not immediately reviewed for errors in grammar and/or spelling **

## 2024-10-25 ENCOUNTER — APPOINTMENT (OUTPATIENT)
Dept: PRIMARY CARE | Facility: CLINIC | Age: 79
End: 2024-10-25
Payer: MEDICARE

## 2024-11-01 ENCOUNTER — TELEPHONE (OUTPATIENT)
Dept: ORTHOPEDIC SURGERY | Facility: CLINIC | Age: 79
End: 2024-11-01

## 2024-11-01 NOTE — TELEPHONE ENCOUNTER
Copied from CRM #[id number]. Topic: Transfer to Department for Scheduling  >> Oct 31, 2024  4:32 PM [place] LOCO wrote:  CRM created and been sent to appropriate department.  Patient called to schedule surgery with Dr. Loomis. He stated his lv with him was 8/6/2024. Please contact to schedule.  [phone] (M)    Thank you

## 2024-11-02 DIAGNOSIS — F11.90 OPIOID USE: ICD-10-CM

## 2024-11-02 DIAGNOSIS — M62.830 LUMBAR PARASPINAL MUSCLE SPASM: ICD-10-CM

## 2024-11-02 DIAGNOSIS — M48.062 LUMBAR STENOSIS WITH NEUROGENIC CLAUDICATION: ICD-10-CM

## 2024-11-02 DIAGNOSIS — G89.4 CHRONIC PAIN SYNDROME: ICD-10-CM

## 2024-11-04 RX ORDER — METHOCARBAMOL 500 MG/1
500 TABLET, FILM COATED ORAL 3 TIMES DAILY
Qty: 45 TABLET | Refills: 0 | Status: SHIPPED | OUTPATIENT
Start: 2024-11-04 | End: 2024-11-19

## 2024-11-04 RX ORDER — TRAMADOL HYDROCHLORIDE 50 MG/1
50 TABLET ORAL EVERY 8 HOURS PRN
Qty: 90 TABLET | Refills: 0 | Status: SHIPPED | OUTPATIENT
Start: 2024-11-04 | End: 2024-12-04

## 2024-11-04 RX ORDER — NALOXONE HYDROCHLORIDE 4 MG/.1ML
1 SPRAY NASAL AS NEEDED
Qty: 1 EACH | Refills: 1 | Status: SHIPPED | OUTPATIENT
Start: 2024-11-04

## 2024-11-18 ENCOUNTER — DOCUMENTATION (OUTPATIENT)
Dept: PRIMARY CARE | Facility: CLINIC | Age: 79
End: 2024-11-18
Payer: MEDICARE

## 2024-11-18 DIAGNOSIS — I10 HYPERTENSION, UNSPECIFIED TYPE: ICD-10-CM

## 2024-11-18 DIAGNOSIS — E78.5 HYPERLIPIDEMIA, UNSPECIFIED HYPERLIPIDEMIA TYPE: ICD-10-CM

## 2024-11-18 RX ORDER — AMLODIPINE BESYLATE 2.5 MG/1
5 TABLET ORAL DAILY
Qty: 180 TABLET | Refills: 3 | Status: SHIPPED | OUTPATIENT
Start: 2024-11-18

## 2024-11-18 RX ORDER — ATORVASTATIN CALCIUM 40 MG/1
40 TABLET, FILM COATED ORAL DAILY
Qty: 90 TABLET | Refills: 3 | Status: SHIPPED | OUTPATIENT
Start: 2024-11-18

## 2024-12-02 ENCOUNTER — APPOINTMENT (OUTPATIENT)
Dept: INTEGRATIVE MEDICINE | Facility: CLINIC | Age: 79
End: 2024-12-02
Payer: COMMERCIAL

## 2024-12-12 ENCOUNTER — APPOINTMENT (OUTPATIENT)
Dept: INTEGRATIVE MEDICINE | Facility: CLINIC | Age: 79
End: 2024-12-12
Payer: COMMERCIAL

## 2024-12-12 DIAGNOSIS — M54.59 OTHER LOW BACK PAIN: Primary | ICD-10-CM

## 2024-12-12 PROCEDURE — 97811 ACUP 1/> W/O ESTIM EA ADD 15: CPT | Performed by: INTERNAL MEDICINE

## 2024-12-12 PROCEDURE — 97810 ACUP 1/> WO ESTIM 1ST 15 MIN: CPT | Performed by: INTERNAL MEDICINE

## 2024-12-12 NOTE — PROGRESS NOTES
Acupuncture Visit:     Subjective   Patient ID: Hank Rick is a 79 y.o. male who presents for No chief complaint on file.  PT reported a few days of sustained benefit post tx.  He has returned to baseline and possibly surpassed it.  In the interim of our treatments he has interfaced with a provider in [facility] who specialized in stem cell based treatment.  In order to take part, removal of spacers in spine which are are no pressing on the nerves is necessary.  January 3rd he will be evaluated for removal.    Previous:  PT seeking support for low back pain.  He is taking Rx to take edge of pain.  Recent results from MRI showed increased degenration in L5/S1.  He prefers to lay on back and/or side.  Standing and walking without assistance trigger pain.  He can walk over a mile with assistance.  Sitting is fine but moving from sitting to standing is difficult.  He does experience some numbness of right side to knee.  Pain is described as dull constant ache.  The pain has migrated to hips instead of low back.        Session Information  Is this acupuncture treatment being billed to the patient's insurance company: Yes  This is visit number: 2  The patient has a total number of visits of: 12  Initial Acupuncture Treatment date: 10/17/24  Last Treatment date: 01/15/25  Name of Insurance Company: Medicare A/B  Name of Supervising Physician: Karlo  Visit Type: Follow-up visit         Review of Systems         Provider reviewed plan for the acupuncture session, precautions and contraindications. Patient/guardian/hospital staff has given consent to treat with full understanding of what to expect during the session. Before acupuncture began, provider explained to the patient to communicate at any time if the procedure was causing discomfort past their tolerance level. Patient agreed to advise acupuncturist. The acupuncturist counseled the patient on the risks of acupuncture treatment including pain, infection,  bleeding, and no relief of pain. The patient was positioned comfortably. There was no evidence of infection at the site of needle insertions.    Objective   Physical Exam         Treatment Plan  Treatment Goals: Pain management    Acupuncture Treatment  Patient Position: Lateral recumbent on a table  Needle Guage: 40 guage /.16/ Red seirin, 42 guage /.14/ Lime green seirin, 36 guage /.20/ Blue seirin, 32 guage /.25/ Purple seirin  Body Points - Bilateral: 2nd rebeca, ub 23, 24, Hjj L4,5  Body Points - Right: si 3, ub 62, gb 41, sj5, lr 3, li 4, gb 30, troachnterx2, km adrenal x3  Other Techniques Utilized: TDP Lamp, Topicals  Topicals Description: warming lotion with ravensara eo  TDP Lamp Descripton: scot men  Needle Count In: 22  Needle Count Out: 22  Needle Retention Time (min): 25 minutes  Total Face to Face Time (min): 25 minutes              Assessment/Plan

## 2024-12-17 ENCOUNTER — OFFICE VISIT (OUTPATIENT)
Dept: NEUROLOGY | Facility: CLINIC | Age: 79
End: 2024-12-17
Payer: MEDICARE

## 2024-12-17 VITALS
RESPIRATION RATE: 18 BRPM | DIASTOLIC BLOOD PRESSURE: 66 MMHG | BODY MASS INDEX: 26.25 KG/M2 | HEART RATE: 52 BPM | WEIGHT: 199 LBS | SYSTOLIC BLOOD PRESSURE: 121 MMHG

## 2024-12-17 DIAGNOSIS — G31.84 MILD COGNITIVE IMPAIRMENT: ICD-10-CM

## 2024-12-17 PROCEDURE — 1159F MED LIST DOCD IN RCRD: CPT | Performed by: PSYCHIATRY & NEUROLOGY

## 2024-12-17 PROCEDURE — 3078F DIAST BP <80 MM HG: CPT | Performed by: PSYCHIATRY & NEUROLOGY

## 2024-12-17 PROCEDURE — 1126F AMNT PAIN NOTED NONE PRSNT: CPT | Performed by: PSYCHIATRY & NEUROLOGY

## 2024-12-17 PROCEDURE — 3074F SYST BP LT 130 MM HG: CPT | Performed by: PSYCHIATRY & NEUROLOGY

## 2024-12-17 PROCEDURE — 99215 OFFICE O/P EST HI 40 MIN: CPT | Performed by: PSYCHIATRY & NEUROLOGY

## 2024-12-17 RX ORDER — TRAMADOL HYDROCHLORIDE 50 MG/1
50 TABLET ORAL DAILY
COMMUNITY

## 2024-12-17 ASSESSMENT — PAIN SCALES - GENERAL: PAINLEVEL_OUTOF10: 0-NO PAIN

## 2024-12-17 NOTE — PROGRESS NOTES
[place], [place]         Department of Neurology     [facility]    F/U Consultation          PCP: Dr. Sonu Larose        2024     Re: Hank Rick   : 1945           MRN [id number]          CC: 77yo man in eval of memory loss.  Info from pt, wife (ISAMAR Smith), and EMR.     HPI: : Covid 2022 then memory diffic uyen names.  ET worse in Rt hand, better now. :  No losing things, or way-finding diffic, but feels “in a fog,” w/ dizzy “unsteadiness” uyen in shower since Covid; no falls, more dozing.  :  More “upbeat,” less memory diffic.  Wife sees less “out-of-it” off NTN.  In driving he is less focused, no accidents.  : Recovers from Rt shoulder surgery, memory better, wife not convinced but agrees word-finding better.  2023:  April L4-5-6 surg spacer insertx2 but pain recurs after lifting.  Last Tu disoriented driving home, got lost.  At home wife asked, pt respd, “I don’t think I have any…then staring and slurring.”  Dxd TIA, “no damage,” d/cd 2d later on HTN Rx.  Next day dizzy in shower, wife found unconscious on floor, called [facility], d/cd home on antiHTN.  Pt lifted too much, exac LBP, shuffling gait (can’t lift feet).  Driving too slow/fast, unsteady shuffling.  Rxd ASA after 2nd Chandrika stroke alert, doing well at home.  :  Fell in bedrm hosp dx Covid, w/o head strike, that week “talking gibberish,” legs heavy.  : Light-headed w/ initial WW=092/70, then 120/70, notices a little bit, an occasional event, had work-out this AM.  He tells me of mildly unsteady and lightheaded is usual for him.  :  Looks well, rises and walks w/o diffic, does pilates and enjoys it.  He thinks his memory is good, the memantine has helped. Not probs w/ memantine.  Sleep cxd by 4-5 to BR overnight, 1st procedure.  Drives.  :  Exploring alternative approaches to his chronic LBP.   Planning further LBP surgery in  [place].     Med Hx: intol NTN (anx, weird dreams),  pollenàrunny nose     Rx: [id number], ecASA81,      akypuanrqmeYG68+10tid,  amlodipine2.5,  tejewkvlkvfvp96, yrahpubav57 (qD?bid),     ftlwecm54-77GU,    ytvfjjkiu54WMzsg,   CPAP,       APAP-[id number],  xkhebqizt85,  diclofenac gel,   flonase pn, dxvdxjs759, wjjtinavi60,      astelin pn,     chxzkkws47,   loygetufc48,   leizj167pcs,    narcan prn              H/O: depress, EssTrm,  WALKER, EsophStrict, GERD,  BPH,  salmo enteritis,  h hernia,  rash     S/P:  LumbarSpacer (2023), corneal Lasik,  RotCuff,   tonsillx,  EGFD     FHx: Dad colon CA; Mom MS, killed by truck,  2 bro, 1 estranged,  2 kids (1 step, 2 gkids)     PSHx: [employer], marriedx3, novbvnf26h; retired, MICHA, OOBtoBRx2, still drives     ROS: Skin-Skel Rt Lstenosis Cardio-pulm seasonal wheeze,GI Barretts   Neuro:  RtRadic      Exam: HV=751/66, HR=52, RR=18, pw=111.8 lb     Lungs w/ clear, RRR w/o MRG, Nl carotids, Abdo+BS;  +1 ankle edema      Neuro: MS: calm/engaging  not anxious or sad     CN:? FEOM H&V w/o ptosis, nl saccs/ptosis,  intact pursuit, face symm      Motor: strong & steady  mild nl tone   w/o UE drift  or invol mvmts        Sens:  -Romberg, symm sway <1cm, misbalance        Coordin: FT fast w/ reg pace bilat          Gait: nl pace, 1 step turn, unstable tandem, little symm armswing      Cognitive: RH  Language: wnl comprehension, spont stmts wnl,      3-5 words phrases, some full sentences    w/o paraphasias      Prev:  MoCA = 27/30 c/w not impaired  GDS: 0/15 c/w no mood disorder     Labs (to 10-):  wbc=6.5, hct=39.6, aww=830          A1c=5.6     bzb=571, bun/crt=30/1.19, GFR=69, AST=16, ALT=17, ALkP=67,  fT4=1.3, TSH=0.99        kxgd=902, HDL=48.5, nonHDL=85, chol/hdl=2.7,  LDL=66,  vldl=18,  TG=91     MMA=90 Xqagdu94=QO   xshk=470,  PT=9.7, INR=1.0 PSA=0.47     OOB Tracker (10-) shows only tramadol & metabolite      ApoE gtyp=ND     EKG (23-Aug-2023): Sinus Inocente=48, TTwF=885        (1-): Sinus Inocente=47, KYdF=914  LVH wideQRS r/o septal infarct     (7-): Sinus Inocente=52, YTnJu=499,  Abnls=LAD,  Nonspec intravent block, miniLVH     Cspine MRI (8-): 1. 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 Hyperintens C5-C7, ? injury/venous/bursitis          MMA=90 Bkndox68=OW   hven=849,  INR=1.0      Neuropsych Eval (14-Sep-2022): …isolated mild weakness of attn/exec func.  Other exec func and memory, lang, vis spat, vis construc and mood well preserved.     Brain MRI (27-Jun-2022): 1. No infarct. 2) mod brain vol loss. 3. Scattered, patchy, & confluent WM changes bilat and brainstem, likely sml vsl changes. 4. Punctate T2 bright in subinsular, BGs, int caps, thalami c/w prom perivasc spaces or small remote lacunes.     Brain MRI/A (9-May-2023) MRI: No acute changes.  GRE hypointense signal Lt>Rt GP extends to bilat med cerebellar peduncles w/ mild Lt encephalomal (degen vs old hem). Mod genl parenchy vol loss & WM change.  MRA: Redemo small Lt vert tapers intracranially.      Brain MRI/A (23-Aug-2023): 1. No acute abnl or mass.  2. Stable chronic findings including prob chronic sml vsl changes in white matter infarcts, overall marked in extent.     Sleep Study (12-May-2023): WALKER on CPAP not well-tolerated, considering Inspire     Brain DATscan (3-): Nl brain I-123 ioflupane (DaTscan) w/o evidence of PD Syn.     A & P:  Post-Covid memory diffic, uyen names.  S/P lumbar vert spacer cxd by over-exertion and worse then two episodes of acute neuro change dxd TIAs.  Genl exam unremarkable.  Neuro exam w/ gait instab and variable symm hand tremor.  Cognitive exam w/ intact language despite TIA related dysphasias.  Prev neuropsych w/ exec dysfunc.  Labs w/ wbc=3.9à8.8 mildly irregular sleep.  MRI (June2022) w/ genl gyral atrophy, bilat ant periventric WM changes.  MRI (May2023) old bilat GPàpeduncles; MRA dimunitive Lt vert tapering intracranially.        Covid x2 “hit,” ?antiHTN drop BP? in car & hot shower.   Told no new lesions.  Chronic LBP dominated the recurrent stroke alerts w/ MR evid of bilat GPàmidPeduncle old lesions, tight Lt vert raises concern Re a cerebrovasc etiology.   MAURICIO scan negative to address that.  His chr depress might be exacerbated, but he tells me memory/cogn going well, and I share that impression.  Regardless of how we got here, we are stopping the Inderal tid, staying w/ prev single dose LA.  MAURICIO scan eliminates the credibility of PD dx.  My interpretation remains that his Lt Vert insuffic accounts for episodic speech arrest by direct Lt post thalamic or cortical hypoperfusion cxd by bradycardia.  I will f/u in 3 months.     Thank you for allowing me to participate in the care of your patient.     Sincerely yours, Darek Sánchez M.D., Ph.D., Professor of Neurology

## 2024-12-19 ENCOUNTER — APPOINTMENT (OUTPATIENT)
Dept: INTEGRATIVE MEDICINE | Facility: CLINIC | Age: 79
End: 2024-12-19
Payer: COMMERCIAL

## 2024-12-24 ENCOUNTER — TELEPHONE (OUTPATIENT)
Dept: PRIMARY CARE | Facility: CLINIC | Age: 79
End: 2024-12-24

## 2024-12-24 DIAGNOSIS — G89.29 CHRONIC LOW BACK PAIN WITHOUT SCIATICA, UNSPECIFIED BACK PAIN LATERALITY: Primary | ICD-10-CM

## 2024-12-24 DIAGNOSIS — M54.50 CHRONIC LOW BACK PAIN WITHOUT SCIATICA, UNSPECIFIED BACK PAIN LATERALITY: Primary | ICD-10-CM

## 2024-12-24 RX ORDER — TRAMADOL HYDROCHLORIDE 50 MG/1
50 TABLET ORAL DAILY
Qty: 7 TABLET | Refills: 0 | Status: SHIPPED | OUTPATIENT
Start: 2024-12-24 | End: 2024-12-31

## 2024-12-24 NOTE — TELEPHONE ENCOUNTER
Pt called stating that he is in [place] and he left his tramadol and wants to know if you can send 7 tramadols to the pharmacy in [place]

## 2024-12-26 ENCOUNTER — APPOINTMENT (OUTPATIENT)
Dept: INTEGRATIVE MEDICINE | Facility: CLINIC | Age: 79
End: 2024-12-26
Payer: COMMERCIAL

## 2024-12-31 ENCOUNTER — OFFICE VISIT (OUTPATIENT)
Dept: URGENT CARE | Age: 79
End: 2024-12-31
Payer: MEDICARE

## 2024-12-31 VITALS
RESPIRATION RATE: 17 BRPM | SYSTOLIC BLOOD PRESSURE: 134 MMHG | OXYGEN SATURATION: 91 % | BODY MASS INDEX: 26.25 KG/M2 | DIASTOLIC BLOOD PRESSURE: 83 MMHG | HEART RATE: 58 BPM | WEIGHT: 199 LBS | TEMPERATURE: 98.3 F

## 2024-12-31 DIAGNOSIS — H00.14 CHALAZION OF LEFT UPPER EYELID: Primary | ICD-10-CM

## 2024-12-31 PROCEDURE — 99203 OFFICE O/P NEW LOW 30 MIN: CPT | Performed by: FAMILY MEDICINE

## 2024-12-31 PROCEDURE — 1036F TOBACCO NON-USER: CPT | Performed by: FAMILY MEDICINE

## 2024-12-31 PROCEDURE — 1159F MED LIST DOCD IN RCRD: CPT | Performed by: FAMILY MEDICINE

## 2024-12-31 PROCEDURE — 3075F SYST BP GE 130 - 139MM HG: CPT | Performed by: FAMILY MEDICINE

## 2024-12-31 PROCEDURE — 3079F DIAST BP 80-89 MM HG: CPT | Performed by: FAMILY MEDICINE

## 2024-12-31 PROCEDURE — 1160F RVW MEDS BY RX/DR IN RCRD: CPT | Performed by: FAMILY MEDICINE

## 2024-12-31 RX ORDER — ERYTHROMYCIN 5 MG/G
OINTMENT OPHTHALMIC 4 TIMES DAILY
Qty: 3.5 G | Refills: 0 | Status: SHIPPED | OUTPATIENT
Start: 2024-12-31 | End: 2025-01-07

## 2024-12-31 NOTE — PROGRESS NOTES
Subjective   Patient ID: Hank Rick is a 79 y.o. male. They present today with a chief complaint of Eye Problem (Patient here for stye on the left eye ).    History of Present Illness  HPI  As noted above.  He has no other complaints.  No vision changes.  Symptoms started 4 days ago.    Past Medical History  Allergies as of 12/31/2024 - Reviewed 12/31/2024   Allergen Reaction Noted    Gabapentin Anxiety 05/18/2023    Pollen extracts Other 07/25/2016       (Not in a hospital admission)       Past Medical History:   Diagnosis Date    Arthritis     Asthma     Cataract     COVID-19 11/15/2022    COVID-19 virus infection    Esophageal obstruction 05/15/2022    Esophageal stricture    Essential tremor 12/29/2022    Essential tremor    GERD (gastroesophageal reflux disease)     Hard of hearing     Hearing aid worn     Hyperlipidemia     Hypertension     Memory impairment     Olecranon bursitis, left elbow 04/26/2016    Olecranon bursitis of left elbow    Personal history of diseases of the blood and blood-forming organs and certain disorders involving the immune mechanism 05/17/2022    History of anemia    Personal history of other diseases of the digestive system 11/13/2014    History of gastroesophageal reflux (GERD)    Personal history of other diseases of the digestive system 01/14/2021    History of hiatal hernia    Personal history of other specified conditions 11/29/2018    History of urinary frequency    Rash and other nonspecific skin eruption 11/02/2021    Skin rash    Salmonella enteritis     Salmonella enteritis    Sleep apnea     TIA (transient ischemic attack)     Vertebral artery stenosis        Past Surgical History:   Procedure Laterality Date    BACK SURGERY      CATARACT EXTRACTION      CT ANGIO NECK  05/12/2023    CT NECK ANGIO W AND WO IV CONTRAST 5/12/2023 [facility]    CT HEAD ANGIO W AND WO IV CONTRAST  05/12/2023    CT HEAD ANGIO W AND WO IV CONTRAST 5/12/2023 [facility]    HERNIA REPAIR      MR HEAD  ANGIO WO IV CONTRAST  05/09/2023    MR HEAD ANGIO WO IV CONTRAST 5/9/2023 [facility] MRI    MR NECK ANGIO WO IV CONTRAST  05/09/2023    MR NECK ANGIO WO IV CONTRAST 5/9/2023 [facility] MRI    OTHER SURGICAL HISTORY  01/07/2015    Esophagogastric Fundoplasty Laparoscopic For Paraesophageal Hernia Repair    OTHER SURGICAL HISTORY  03/05/2014    Shoulder Surgery Left    OTHER SURGICAL HISTORY      REFRACTIVE SURGERY  03/05/2014    Corneal LASIK    TONSILLECTOMY  11/18/2014    Tonsillectomy    TOTAL SHOULDER ARTHROPLASTY Right     reverse        reports that he has never smoked. He has never used smokeless tobacco. He reports current alcohol use of about 4.0 standard drinks of alcohol per week. He reports that he does not currently use drugs after having used the following drugs: Marijuana.    Review of Systems  Review of Systems                               Objective    Vitals:    12/31/24 1542   BP: 134/83   BP Location: Left arm   Patient Position: Sitting   Pulse: 58   Resp: 17   Temp: 36.8 °C (98.3 °F)   TempSrc: Oral   SpO2: 91%   Weight: 90.3 kg (199 lb)     No LMP for male patient.    Physical Exam    Constitutional: Vital signs reviewed. Well developed and well nourished. Patient alert and without distress.     Head and Face: Normal, no orbital swelling.  Atraumatic.  No lesions.    Eyes: Pupils and irises normal. Pink conjunctivae, anicteric sclerae.  Positive for chalazion on the left upper eyelid, no signs of cellulitis.  No conjunctival injection.  Cornea clear.    Neurologic: Cortical function: Normal        Procedures    Point of Care Test & Imaging Results from this visit  No results found for this visit on 12/31/24.   No results found.    Diagnostic study results (if any) were reviewed by Emilee Solis.    Assessment/Plan   Allergies, medications, history, and pertinent labs/EKGs/Imaging reviewed by Emilee Solis.     Medical Decision Making  Erythromycin eye ointment prescribed, instructed  on how to use.  Potential side effect is blurriness, to be careful and mindful to avoid injuries.  Frequent handwashing especially before and after applying the eye ointment.  Definite follow-up with ophthalmology in 1 to 2 weeks.  Patient expressed understanding and agrees.    Orders and Diagnoses  Diagnoses and all orders for this visit:  Chalazion of left upper eyelid  -     erythromycin (Romycin) 5 mg/gram (0.5 %) ophthalmic ointment; Apply to left eye 4 times a day for 7 days. Apply Amount per Dose: 0.5 inch (~1 cm) per dose.      Medical Admin Record      Patient disposition: Home    Electronically signed by Emilee Solis  4:39 PM

## 2025-01-02 ENCOUNTER — APPOINTMENT (OUTPATIENT)
Dept: INTEGRATIVE MEDICINE | Facility: CLINIC | Age: 80
End: 2025-01-02
Payer: COMMERCIAL

## 2025-01-02 ENCOUNTER — DOCUMENTATION (OUTPATIENT)
Dept: PRIMARY CARE | Facility: CLINIC | Age: 80
End: 2025-01-02

## 2025-01-02 DIAGNOSIS — M54.59 OTHER LOW BACK PAIN: Primary | ICD-10-CM

## 2025-01-02 PROCEDURE — 97811 ACUP 1/> W/O ESTIM EA ADD 15: CPT | Performed by: INTERNAL MEDICINE

## 2025-01-02 PROCEDURE — 97810 ACUP 1/> WO ESTIM 1ST 15 MIN: CPT | Performed by: INTERNAL MEDICINE

## 2025-01-02 NOTE — PROGRESS NOTES
Called Mr. Rick to introduce Chronic care management services. Unable to reach. Left voicemail. Flyer sent via [employer] and mail.

## 2025-01-02 NOTE — PROGRESS NOTES
Acupuncture Visit:     Subjective   Patient ID: Hank Rick is a 79 y.o. male who presents for No chief complaint on file.  Pt reported similar outcomes to acupuncture treatment.  He is looking forward to evaluation and hopes it will be able to give direction to next steps.  Currently, he has usual 8/10 morning pain pattern.  Rx takes edge off.  Previous:  PT reported a few days of sustained benefit post tx.  He has returned to baseline and possibly surpassed it.  In the interim of our treatments he has interfaced with a provider in [facility] who specialized in stem cell based treatment.  In order to take part, removal of spacers in spine which are are no pressing on the nerves is necessary.  January 3rd he will be evaluated for removal.    Previous:  PT seeking support for low back pain.  He is taking Rx to take edge of pain.  Recent results from MRI showed increased degenration in L5/S1.  He prefers to lay on back and/or side.  Standing and walking without assistance trigger pain.  He can walk over a mile with assistance.  Sitting is fine but moving from sitting to standing is difficult.  He does experience some numbness of right side to knee.  Pain is described as dull constant ache.  The pain has migrated to hips instead of low back.                  Session Information  This is visit number: 3  The patient has a total number of visits of: 12  Initial Acupuncture Treatment date: 10/17/24  Last Treatment date: 01/15/25  Name of Insurance Company: Medicare A/B  Name of Supervising Physician: Chayo  Visit Type: Follow-up visit         Review of Systems         Provider reviewed plan for the acupuncture session, precautions and contraindications. Patient/guardian/hospital staff has given consent to treat with full understanding of what to expect during the session. Before acupuncture began, provider explained to the patient to communicate at any time if the procedure was causing discomfort past their tolerance  level. Patient agreed to advise acupuncturist. The acupuncturist counseled the patient on the risks of acupuncture treatment including pain, infection, bleeding, and no relief of pain. The patient was positioned comfortably. There was no evidence of infection at the site of needle insertions.    Objective   Physical Exam         Treatment Plan  Treatment Goals: Pain management    Acupuncture Treatment  Needle Guage: 40 guage /.16/ Red seirin, 42 guage /.14/ Lime green seirin, 32 guage /.25/ Purple seirin  Body Points - Left: li 4, si 3 sj 5  Body Points - Bilateral: 2nd rebeca, ub 23, 24, Hjj L4,5, 3, 2, 1  Body Points - Right: km adreanl x3, gb 41, ub 62, lr 3  Other Techniques Utilized: TDP Lamp, Topicals  Topicals Description: warming lotion with ravensara eo  TDP Lamp Descripton: scot men  Needle Count In: 26  Needle Count Out: 26  Needle Retention Time (min): 30 minutes  Total Face to Face Time (min): 25 minutes              Assessment/Plan

## 2025-01-06 ENCOUNTER — LAB (OUTPATIENT)
Dept: LAB | Facility: LAB | Age: 80
End: 2025-01-06
Payer: MEDICARE

## 2025-01-06 ENCOUNTER — APPOINTMENT (OUTPATIENT)
Dept: PRIMARY CARE | Facility: CLINIC | Age: 80
End: 2025-01-06
Payer: MEDICARE

## 2025-01-06 VITALS
HEIGHT: 73 IN | HEART RATE: 64 BPM | SYSTOLIC BLOOD PRESSURE: 110 MMHG | WEIGHT: 197.8 LBS | DIASTOLIC BLOOD PRESSURE: 68 MMHG | OXYGEN SATURATION: 92 % | BODY MASS INDEX: 26.21 KG/M2

## 2025-01-06 DIAGNOSIS — R14.3 FLATULENCE SYMPTOM: ICD-10-CM

## 2025-01-06 DIAGNOSIS — F01.50 MIXED ALZHEIMER'S AND VASCULAR DEMENTIA (MULTI): ICD-10-CM

## 2025-01-06 DIAGNOSIS — G47.33 OSA (OBSTRUCTIVE SLEEP APNEA): ICD-10-CM

## 2025-01-06 DIAGNOSIS — M54.50 CHRONIC LOW BACK PAIN WITHOUT SCIATICA, UNSPECIFIED BACK PAIN LATERALITY: ICD-10-CM

## 2025-01-06 DIAGNOSIS — D69.2 OTHER NONTHROMBOCYTOPENIC PURPURA: ICD-10-CM

## 2025-01-06 DIAGNOSIS — G89.29 CHRONIC LOW BACK PAIN WITHOUT SCIATICA, UNSPECIFIED BACK PAIN LATERALITY: ICD-10-CM

## 2025-01-06 DIAGNOSIS — Z86.79: ICD-10-CM

## 2025-01-06 DIAGNOSIS — G45.9 TIA (TRANSIENT ISCHEMIC ATTACK): ICD-10-CM

## 2025-01-06 DIAGNOSIS — G89.4 CHRONIC PAIN SYNDROME: Primary | Chronic | ICD-10-CM

## 2025-01-06 DIAGNOSIS — M48.062 SPINAL STENOSIS OF LUMBAR REGION WITH NEUROGENIC CLAUDICATION: ICD-10-CM

## 2025-01-06 DIAGNOSIS — M48.062 LUMBAR STENOSIS WITH NEUROGENIC CLAUDICATION: ICD-10-CM

## 2025-01-06 DIAGNOSIS — F02.80 MIXED ALZHEIMER'S AND VASCULAR DEMENTIA (MULTI): ICD-10-CM

## 2025-01-06 DIAGNOSIS — G30.9 MIXED ALZHEIMER'S AND VASCULAR DEMENTIA (MULTI): ICD-10-CM

## 2025-01-06 DIAGNOSIS — I10 PRIMARY HYPERTENSION: ICD-10-CM

## 2025-01-06 DIAGNOSIS — E78.5 HYPERLIPIDEMIA, UNSPECIFIED HYPERLIPIDEMIA TYPE: ICD-10-CM

## 2025-01-06 DIAGNOSIS — J30.9 ALLERGIC RHINITIS, UNSPECIFIED SEASONALITY, UNSPECIFIED TRIGGER: ICD-10-CM

## 2025-01-06 DIAGNOSIS — G20.C PARKINSONISM, UNSPECIFIED PARKINSONISM TYPE (MULTI): ICD-10-CM

## 2025-01-06 LAB
AMYLASE SERPL-CCNC: 51 U/L (ref 29–103)
GLIADIN PEPTIDE IGA SER IA-ACNC: <1 U/ML
LIPASE SERPL-CCNC: 23 U/L (ref 9–82)
TTG IGA SER IA-ACNC: <1 U/ML

## 2025-01-06 PROCEDURE — 1158F ADVNC CARE PLAN TLK DOCD: CPT | Performed by: INTERNAL MEDICINE

## 2025-01-06 PROCEDURE — 83516 IMMUNOASSAY NONANTIBODY: CPT

## 2025-01-06 PROCEDURE — G2211 COMPLEX E/M VISIT ADD ON: HCPCS | Performed by: INTERNAL MEDICINE

## 2025-01-06 PROCEDURE — 3074F SYST BP LT 130 MM HG: CPT | Performed by: INTERNAL MEDICINE

## 2025-01-06 PROCEDURE — 36415 COLL VENOUS BLD VENIPUNCTURE: CPT

## 2025-01-06 PROCEDURE — 1123F ACP DISCUSS/DSCN MKR DOCD: CPT | Performed by: INTERNAL MEDICINE

## 2025-01-06 PROCEDURE — 1160F RVW MEDS BY RX/DR IN RCRD: CPT | Performed by: INTERNAL MEDICINE

## 2025-01-06 PROCEDURE — 3078F DIAST BP <80 MM HG: CPT | Performed by: INTERNAL MEDICINE

## 2025-01-06 PROCEDURE — 1036F TOBACCO NON-USER: CPT | Performed by: INTERNAL MEDICINE

## 2025-01-06 PROCEDURE — 1159F MED LIST DOCD IN RCRD: CPT | Performed by: INTERNAL MEDICINE

## 2025-01-06 PROCEDURE — 82150 ASSAY OF AMYLASE: CPT

## 2025-01-06 PROCEDURE — 99214 OFFICE O/P EST MOD 30 MIN: CPT | Performed by: INTERNAL MEDICINE

## 2025-01-06 PROCEDURE — 83690 ASSAY OF LIPASE: CPT

## 2025-01-06 PROCEDURE — 89125 SPECIMEN FAT STAIN: CPT

## 2025-01-06 RX ORDER — MONTELUKAST SODIUM 10 MG/1
10 TABLET ORAL DAILY
Qty: 90 TABLET | Refills: 2 | Status: SHIPPED | OUTPATIENT
Start: 2025-01-06

## 2025-01-06 RX ORDER — CLOPIDOGREL BISULFATE 75 MG/1
75 TABLET ORAL DAILY
Qty: 90 TABLET | Refills: 1 | Status: SHIPPED | OUTPATIENT
Start: 2025-01-06

## 2025-01-06 RX ORDER — TOPIRAMATE 25 MG/1
25 TABLET ORAL 2 TIMES DAILY
Qty: 60 TABLET | Refills: 2 | Status: SHIPPED | OUTPATIENT
Start: 2025-01-06 | End: 2026-01-06

## 2025-01-06 ASSESSMENT — ENCOUNTER SYMPTOMS: CONSTITUTIONAL NEGATIVE: 1

## 2025-01-06 NOTE — PROGRESS NOTES
"Patient ID: Hank Rick is a 79 y.o. male who presents for Follow-up (GI issues. ).    /68   Pulse 64   Ht 1.854 m (6' 1\")   Wt 89.7 kg (197 lb 12.8 oz)   SpO2 92%   BMI 26.10 kg/m²     HPI      I am having itching  Most likely dry skin  No rash        Patient has chronic pain  Chronic lower back pain  He has significant lumbar canal stenosis  Pain scale is 7-8 out of 10  Tramadol 50 mg is not helping much  He is also taking topiramate 50 mg a day  It is affecting his daily ability to do things        He is having a lot of flatulence  No dietary indiscretion  No intolerance to milk or dairy products  No abdominal pain       No GERD symptoms        Subjective     Review of Systems   Constitutional: Negative.    All other systems reviewed and are negative.      Objective     Physical Exam  Vitals and nursing note reviewed.   Neck:      Vascular: No carotid bruit.   Cardiovascular:      Rate and Rhythm: Normal rate and regular rhythm.      Pulses: Normal pulses.      Heart sounds: Normal heart sounds. No murmur heard.  Pulmonary:      Effort: Pulmonary effort is normal.      Breath sounds: Normal breath sounds.   Abdominal:      Palpations: There is no mass.   Musculoskeletal:      Right lower leg: No edema.      Left lower leg: No edema.   Skin:     Capillary Refill: Capillary refill takes more than 3 seconds.   Neurological:      General: No focal deficit present.      Mental Status: He is oriented to person, place, and time. Mental status is at baseline.   Psychiatric:         Mood and Affect: Mood normal.         Thought Content: Thought content normal.         Judgment: Judgment normal.         Lab Results   Component Value Date    WBC 6.9 04/19/2024    HGB 12.4 (L) 04/19/2024    HCT 39.2 (L) 04/19/2024    MCV 97 04/19/2024     04/19/2024           Problem List Items Addressed This Visit       Primary hypertension    Hyperlipidemia    Mixed Alzheimer's and vascular dementia (Multi)     STABLE "          WALKER (obstructive sleep apnea)    Lumbar canal stenosis    Other nonthrombocytopenic purpura    Chronic pain syndrome - Primary    Parkinsonism, unspecified Parkinsonism type (Multi)     STABLE ON MEDICATION           Other Visit Diagnoses       Flatulence symptom        Relevant Orders    Celiac Panel    Fecal Fat, Qualitative    Amylase    Lipase              A/P        STOOL FECAL FAT  AMYLASE, LIPASE   CELIAC PANEL   OPIOID NOTE FORM COMPLETED   OPIOID URINE DRUG SCREEN UPTODATE   TRAMADOL 50MG 1 TABLET EVERY DAY   CONTROLLED SUBSTANCES AGREEMENT UPTODATE   CLOPIDOGREL 75MG 1 TABLET EVERY DAY FILLED   MONTELUKAST 10MG 1 TABLET EVERY DAY FILLED       OARRS:  Sonu Larose MD on 1/6/2025  8:44 AM  I have personally reviewed the OARRS report for Hank Rick. I have considered the risks of abuse, dependence, addiction and diversion and I believe that it is clinically appropriate for Hank Rick to be prescribed this medication    Is the patient prescribed a combination of a benzodiazepine and opioid?  No    Last Urine Drug Screen / ordered today: Yes  Recent Results (from the past 8760 hours)   Confirmation Opiate/Opioid/Benzo Prescription Compliance    Collection Time: 10/15/24  4:03 PM   Result Value Ref Range    Clonazepam <25 <25 ng/mL    7-Aminoclonazepam <25 <25 ng/mL    Alprazolam <25 <25 ng/mL    Alpha-Hydroxyalprazolam <25 <25 ng/mL    Midazolam <25 <25 ng/mL    Alpha-Hydroxymidazolam <25 <25 ng/mL    Chlordiazepoxide <25 <25 ng/mL    Diazepam <25 <25 ng/mL    Nordiazepam <25 <25 ng/mL    Temazepam <25 <25 ng/mL    Oxazepam <25 <25 ng/mL    Lorazepam <25 <25 ng/mL    Methadone <25 <25 ng/mL    EDDP <25 <25 ng/mL    6-Acetylmorphine <25 <25 ng/mL    Codeine <50 <50 ng/mL    Hydrocodone <25 <25 ng/mL    Hydromorphone <25 <25 ng/mL    Morphine  <50 <50 ng/mL    Norhydrocodone <25 <25 ng/mL    Noroxycodone <25 <25 ng/mL    Oxycodone <25 <25 ng/mL    Oxymorphone <25 <25 ng/mL    Fentanyl <2.5  <2.5 ng/mL    Norfentanyl <2.5 <2.5 ng/mL    Tramadol >1,000 (H) <50 ng/mL    O-Desmethyltramadol >1,000 (H) <50 ng/mL    Zolpidem <25 <25 ng/mL    Zolpidem Metabolite (ZCA) <25 <25 ng/mL   Screen Opiate/Opioid/Benzo Prescription Compliance    Collection Time: 10/15/24  4:03 PM   Result Value Ref Range    Creatinine, Urine Random 54.8 20.0 - 370.0 mg/dL    Amphetamine Screen, Urine Presumptive Negative Presumptive Negative    Barbiturate Screen, Urine Presumptive Negative Presumptive Negative    Cannabinoid Screen, Urine Presumptive Negative Presumptive Negative    Cocaine Metabolite Screen, Urine Presumptive Negative Presumptive Negative    PCP Screen, Urine Presumptive Negative Presumptive Negative     Results are as expected.         Controlled Substance Agreement:  Date of the Last Agreement: 10/15/2024  Reviewed Controlled Substance Agreement including but not limited to the benefits, risks, and alternatives to treatment with a Controlled Substance medication(s).    Opioids:  What is the patient's goal of therapy? TO HELP WITH THE PAIN   Is this being achieved with current treatment? YES    I have calculated the patient's Morphine Dose Equivalent (MED):   I have considered referral to Pain Management and/or a specialist, and do not feel it is necessary at this time.    I feel that it is clinically indicated to continue this current medication regimen after consideration of alternative therapies, and other non-opioid treatment.    Opioid Risk Screening:  No data recorded    Pain Assessment:  No data recorded

## 2025-01-09 LAB
FAT STL QL: NORMAL
NEUTRAL FAT STL QL: NORMAL

## 2025-01-13 ENCOUNTER — TELEPHONE (OUTPATIENT)
Dept: PRIMARY CARE | Facility: CLINIC | Age: 80
End: 2025-01-13
Payer: MEDICARE

## 2025-01-13 DIAGNOSIS — G89.4 CHRONIC PAIN SYNDROME: Primary | ICD-10-CM

## 2025-01-13 DIAGNOSIS — M48.062 SPINAL STENOSIS OF LUMBAR REGION WITH NEUROGENIC CLAUDICATION: ICD-10-CM

## 2025-01-13 DIAGNOSIS — G89.29 CHRONIC BILATERAL LOW BACK PAIN WITH BILATERAL SCIATICA: ICD-10-CM

## 2025-01-13 DIAGNOSIS — M54.42 CHRONIC BILATERAL LOW BACK PAIN WITH BILATERAL SCIATICA: ICD-10-CM

## 2025-01-13 DIAGNOSIS — M54.41 CHRONIC BILATERAL LOW BACK PAIN WITH BILATERAL SCIATICA: ICD-10-CM

## 2025-01-13 DIAGNOSIS — G89.4 CHRONIC PAIN SYNDROME: ICD-10-CM

## 2025-01-13 LAB
Lab: NORMAL
Lab: NORMAL

## 2025-01-13 RX ORDER — TRAMADOL HYDROCHLORIDE 50 MG/1
50 TABLET ORAL EVERY 8 HOURS PRN
Qty: 90 TABLET | Refills: 0 | Status: SHIPPED | OUTPATIENT
Start: 2025-01-13 | End: 2025-02-12

## 2025-01-13 RX ORDER — TRAMADOL HYDROCHLORIDE 50 MG/1
50 TABLET ORAL EVERY 8 HOURS PRN
Qty: 90 TABLET | Refills: 0 | Status: SHIPPED | OUTPATIENT
Start: 2025-01-13 | End: 2025-01-13 | Stop reason: SDUPTHER

## 2025-01-17 DIAGNOSIS — G47.00 INSOMNIA, UNSPECIFIED TYPE: ICD-10-CM

## 2025-01-18 RX ORDER — TRAZODONE HYDROCHLORIDE 50 MG/1
50 TABLET ORAL NIGHTLY
Qty: 90 TABLET | Refills: 1 | Status: SHIPPED | OUTPATIENT
Start: 2025-01-18

## 2025-01-30 ENCOUNTER — TELEPHONE (OUTPATIENT)
Dept: PRIMARY CARE | Facility: CLINIC | Age: 80
End: 2025-01-30

## 2025-01-30 DIAGNOSIS — J30.9 CHRONIC ALLERGIC RHINITIS: ICD-10-CM

## 2025-01-30 DIAGNOSIS — G45.9 TIA (TRANSIENT ISCHEMIC ATTACK): ICD-10-CM

## 2025-01-30 DIAGNOSIS — Z86.79: ICD-10-CM

## 2025-01-30 RX ORDER — CLOPIDOGREL BISULFATE 75 MG/1
75 TABLET ORAL DAILY
Qty: 90 TABLET | Refills: 1 | Status: SHIPPED | OUTPATIENT
Start: 2025-01-30

## 2025-01-30 RX ORDER — AZELASTINE 1 MG/ML
2 SPRAY, METERED NASAL 2 TIMES DAILY
Qty: 30 ML | Refills: 3 | Status: SHIPPED | OUTPATIENT
Start: 2025-01-30

## 2025-02-06 ENCOUNTER — APPOINTMENT (OUTPATIENT)
Dept: INTEGRATIVE MEDICINE | Facility: CLINIC | Age: 80
End: 2025-02-06
Payer: MEDICARE

## 2025-02-06 NOTE — TELEPHONE ENCOUNTER
Pt called stating that he needs a letter to stop his blood thinners he has a procedure on 2/17/2025 and he states he is having stomach issues and needs a referral for a G.I. doctor

## 2025-02-10 ENCOUNTER — APPOINTMENT (OUTPATIENT)
Dept: INTEGRATIVE MEDICINE | Facility: CLINIC | Age: 80
End: 2025-02-10
Payer: MEDICARE

## 2025-02-10 DIAGNOSIS — M54.59 OTHER LOW BACK PAIN: Primary | ICD-10-CM

## 2025-02-10 PROCEDURE — 97810 ACUP 1/> WO ESTIM 1ST 15 MIN: CPT | Performed by: INTERNAL MEDICINE

## 2025-02-10 PROCEDURE — 97811 ACUP 1/> W/O ESTIM EA ADD 15: CPT | Performed by: INTERNAL MEDICINE

## 2025-02-10 NOTE — PROGRESS NOTES
Acupuncture Visit:     Subjective   Patient ID: Hank Rick is a 79 y.o. male who presents for No chief complaint on file.  CHAYO  1/8 ADD    Notes a few days of pain relief p acu tx  States he is having an experimental spinal stimulator placed next week    Prev  Pt reported similar outcomes to acupuncture treatment.  He is looking forward to evaluation and hopes it will be able to give direction to next steps.  Currently, he has usual 8/10 morning pain pattern.  Rx takes edge off.  Previous:  PT reported a few days of sustained benefit post tx.  He has returned to baseline and possibly surpassed it.  In the interim of our treatments he has interfaced with a provider in [facility] who specialized in stem cell based treatment.  In order to take part, removal of spacers in spine which are are no pressing on the nerves is necessary.  January 3rd he will be evaluated for removal.    Previous:  PT seeking support for low back pain.  He is taking Rx to take edge of pain.  Recent results from MRI showed increased degenration in L5/S1.  He prefers to lay on back and/or side.  Standing and walking without assistance trigger pain.  He can walk over a mile with assistance.  Sitting is fine but moving from sitting to standing is difficult.  He does experience some numbness of right side to knee.  Pain is described as dull constant ache.  The pain has migrated to hips instead of low back.                  Session Information  Is this acupuncture treatment being billed to the patient's insurance company: Yes  The patient has a total number of visits of: 8  Initial Acupuncture Treatment date: 10/17/24  Last Treatment date: 01/15/25  Name of Insurance Company: Medicare A/B  Name of Supervising Physician: Chayo         Review of Systems         Provider reviewed plan for the acupuncture session, precautions and contraindications. Patient/guardian/hospital staff has given consent to treat with full understanding of what to  expect during the session. Before acupuncture began, provider explained to the patient to communicate at any time if the procedure was causing discomfort past their tolerance level. Patient agreed to advise acupuncturist. The acupuncturist counseled the patient on the risks of acupuncture treatment including pain, infection, bleeding, and no relief of pain. The patient was positioned comfortably. There was no evidence of infection at the site of needle insertions.    Objective   Physical Exam              Acupuncture Treatment  Body Points - Left: LI 4, SJ 5, SI 3  Body Points - Bilateral: Kd 7, UB 23-25, HTJJ L4-S1  Body Points - Right: Lv 3, GB 40, 41, UB 62  Other Techniques Utilized: TDP Lamp  TDP Lamp Descripton: scot men with 2 moxa  Needle Count In: 21  Needle Count Out: 21  Total Face to Face Time (min): 25 minutes              Assessment/Plan

## 2025-02-12 NOTE — TELEPHONE ENCOUNTER
Pt called stating that he needs a letter from you to stop taking Plavix 7 days before his procedure but now it is five days before the procedure and he needs the letter today.

## 2025-02-13 ENCOUNTER — PATIENT OUTREACH (OUTPATIENT)
Dept: PRIMARY CARE | Facility: CLINIC | Age: 80
End: 2025-02-13

## 2025-02-13 ENCOUNTER — APPOINTMENT (OUTPATIENT)
Dept: INTEGRATIVE MEDICINE | Facility: CLINIC | Age: 80
End: 2025-02-13
Payer: MEDICARE

## 2025-02-13 DIAGNOSIS — F02.80 MIXED ALZHEIMER'S AND VASCULAR DEMENTIA (MULTI): ICD-10-CM

## 2025-02-13 DIAGNOSIS — I10 PRIMARY HYPERTENSION: ICD-10-CM

## 2025-02-13 DIAGNOSIS — G30.9 MIXED ALZHEIMER'S AND VASCULAR DEMENTIA (MULTI): ICD-10-CM

## 2025-02-13 DIAGNOSIS — E78.5 HYPERLIPIDEMIA, UNSPECIFIED HYPERLIPIDEMIA TYPE: ICD-10-CM

## 2025-02-13 DIAGNOSIS — F01.50 MIXED ALZHEIMER'S AND VASCULAR DEMENTIA (MULTI): ICD-10-CM

## 2025-02-13 DIAGNOSIS — M54.59 OTHER LOW BACK PAIN: Primary | ICD-10-CM

## 2025-02-13 PROCEDURE — 97811 ACUP 1/> W/O ESTIM EA ADD 15: CPT | Performed by: ACUPUNCTURIST

## 2025-02-13 PROCEDURE — 97810 ACUP 1/> WO ESTIM 1ST 15 MIN: CPT | Performed by: ACUPUNCTURIST

## 2025-02-13 ASSESSMENT — ENCOUNTER SYMPTOMS
OCCASIONAL FEELINGS OF UNSTEADINESS: 1
LOSS OF SENSATION IN FEET: 0
DEPRESSION: 0

## 2025-02-13 NOTE — PROGRESS NOTES
Acupuncture Visit:     Subjective   Patient ID: Hank Rick is a 79 y.o. male who presents for No chief complaint on file.  HPI              Review of Systems         Provider reviewed plan for the acupuncture session, precautions and contraindications. Patient/guardian/hospital staff has given consent to treat with full understanding of what to expect during the session. Before acupuncture began, provider explained to the patient to communicate at any time if the procedure was causing discomfort past their tolerance level. Patient agreed to advise acupuncturist. The acupuncturist counseled the patient on the risks of acupuncture treatment including pain, infection, bleeding, and no relief of pain. The patient was positioned comfortably. There was no evidence of infection at the site of needle insertions.    Objective   Physical Exam                             Assessment/Plan

## 2025-02-14 ENCOUNTER — TELEPHONE (OUTPATIENT)
Dept: PRIMARY CARE | Facility: CLINIC | Age: 80
End: 2025-02-14
Payer: MEDICARE

## 2025-02-17 ENCOUNTER — APPOINTMENT (OUTPATIENT)
Dept: INTEGRATIVE MEDICINE | Facility: CLINIC | Age: 80
End: 2025-02-17
Payer: MEDICARE

## 2025-02-19 ENCOUNTER — OFFICE VISIT (OUTPATIENT)
Dept: PRIMARY CARE | Facility: CLINIC | Age: 80
End: 2025-02-19
Payer: MEDICARE

## 2025-02-19 VITALS
HEIGHT: 73 IN | HEART RATE: 48 BPM | DIASTOLIC BLOOD PRESSURE: 80 MMHG | BODY MASS INDEX: 25.69 KG/M2 | SYSTOLIC BLOOD PRESSURE: 140 MMHG | WEIGHT: 193.8 LBS

## 2025-02-19 DIAGNOSIS — M25.562 CHRONIC PAIN OF BOTH KNEES: Primary | Chronic | ICD-10-CM

## 2025-02-19 DIAGNOSIS — I10 PRIMARY HYPERTENSION: ICD-10-CM

## 2025-02-19 DIAGNOSIS — G89.29 CHRONIC PAIN OF BOTH KNEES: Primary | Chronic | ICD-10-CM

## 2025-02-19 DIAGNOSIS — G47.33 OSA (OBSTRUCTIVE SLEEP APNEA): ICD-10-CM

## 2025-02-19 DIAGNOSIS — M17.11 PRIMARY OSTEOARTHRITIS OF RIGHT KNEE: ICD-10-CM

## 2025-02-19 DIAGNOSIS — M17.12 PRIMARY OSTEOARTHRITIS OF LEFT KNEE: ICD-10-CM

## 2025-02-19 DIAGNOSIS — M17.0 PRIMARY OSTEOARTHRITIS OF BOTH KNEES: ICD-10-CM

## 2025-02-19 DIAGNOSIS — M25.561 CHRONIC PAIN OF BOTH KNEES: Primary | Chronic | ICD-10-CM

## 2025-02-19 PROCEDURE — 1159F MED LIST DOCD IN RCRD: CPT | Performed by: INTERNAL MEDICINE

## 2025-02-19 PROCEDURE — 99215 OFFICE O/P EST HI 40 MIN: CPT | Performed by: INTERNAL MEDICINE

## 2025-02-19 PROCEDURE — 1123F ACP DISCUSS/DSCN MKR DOCD: CPT | Performed by: INTERNAL MEDICINE

## 2025-02-19 PROCEDURE — 1160F RVW MEDS BY RX/DR IN RCRD: CPT | Performed by: INTERNAL MEDICINE

## 2025-02-19 PROCEDURE — 20610 DRAIN/INJ JOINT/BURSA W/O US: CPT | Performed by: INTERNAL MEDICINE

## 2025-02-19 PROCEDURE — 3079F DIAST BP 80-89 MM HG: CPT | Performed by: INTERNAL MEDICINE

## 2025-02-19 PROCEDURE — 1036F TOBACCO NON-USER: CPT | Performed by: INTERNAL MEDICINE

## 2025-02-19 PROCEDURE — 3077F SYST BP >= 140 MM HG: CPT | Performed by: INTERNAL MEDICINE

## 2025-02-19 RX ORDER — LIDOCAINE HYDROCHLORIDE 10 MG/ML
1 INJECTION, SOLUTION INFILTRATION; PERINEURAL
Status: COMPLETED | OUTPATIENT
Start: 2025-02-19 | End: 2025-02-19

## 2025-02-19 RX ORDER — TRIAMCINOLONE ACETONIDE 40 MG/ML
80 INJECTION, SUSPENSION INTRA-ARTICULAR; INTRAMUSCULAR
Status: COMPLETED | OUTPATIENT
Start: 2025-02-19 | End: 2025-02-19

## 2025-02-19 RX ADMIN — LIDOCAINE HYDROCHLORIDE 1 ML: 10 INJECTION, SOLUTION INFILTRATION; PERINEURAL at 16:23

## 2025-02-19 RX ADMIN — TRIAMCINOLONE ACETONIDE 80 MG: 40 INJECTION, SUSPENSION INTRA-ARTICULAR; INTRAMUSCULAR at 16:23

## 2025-02-19 ASSESSMENT — ENCOUNTER SYMPTOMS
CONSTITUTIONAL NEGATIVE: 1
LOSS OF SENSATION IN FEET: 0
OCCASIONAL FEELINGS OF UNSTEADINESS: 0
DEPRESSION: 0

## 2025-02-19 NOTE — PROGRESS NOTES
"Patient ID: Hank Rick is a 79 y.o. male who presents for knee injections (Both knees).    /80   Pulse (!) 48   Ht 1.854 m (6' 1\")   Wt 87.9 kg (193 lb 12.8 oz)   BMI 25.57 kg/m²     HPI      Patient is here for cortisone injection in both knees  He has osteoarthritis both knees  Pain scale is 5-7 out of 10  It is affecting his daily activities  Knee feels stiff and tight  No buckling of the affected knees  No locking sensation of the affected knees  Last cortisone injection in both knees was on 4/29/2024  It it helps pretty significantly        Hypertension on medications controlled  No chest pain, no shortness of breath, no PND,  No orthopnea, no leg swelling, no palpitation, no headache,      Chronic GERD stable  Plan omeprazole        Obstructive sleep apnea  Using CPAP stable        Subjective     Review of Systems   Constitutional: Negative.    All other systems reviewed and are negative.      Objective     Physical Exam  Vitals and nursing note reviewed.   Constitutional:       Appearance: Normal appearance.   Neck:      Vascular: No carotid bruit.   Cardiovascular:      Rate and Rhythm: Normal rate and regular rhythm.      Pulses: Normal pulses.      Heart sounds: Normal heart sounds.   Pulmonary:      Effort: Pulmonary effort is normal.      Breath sounds: Normal breath sounds.   Musculoskeletal:      Right lower leg: No edema.      Left lower leg: No edema.      Comments: BOTH KNEES ROM PAINFUL   COARSE CREPITATIONS NOTED   Neurological:      General: No focal deficit present.      Mental Status: He is oriented to person, place, and time. Mental status is at baseline.   Psychiatric:         Mood and Affect: Mood normal.         Behavior: Behavior normal.         Thought Content: Thought content normal.         Judgment: Judgment normal.         Lab Results   Component Value Date    WBC 6.9 04/19/2024    HGB 12.4 (L) 04/19/2024    HCT 39.2 (L) 04/19/2024    MCV 97 04/19/2024     " 04/19/2024           Problem List Items Addressed This Visit       Primary hypertension    WALKER (obstructive sleep apnea)    Chronic pain of both knees - Primary    Osteoarthritis of both knees           A/P       With patient's consent code is a chronic cortisone injection given his both knees  After aseptic precaution patient tolerated the procedure very well         Patient ID: Hank Rick is a 79 y.o. male.    Joint Injection Large/Arthrocentesis: bilateral knee (BOTH KNEES) on 2/19/2025 4:23 PM  Indications: pain (BOTH KNEES )  Details: 22 G needle, anterolateral approach (guidance: WITH CLINICAL EXAM )  Medications (Right): 1 mL lidocaine 10 mg/mL (1 %); 80 mg triamcinolone acetonide 40 mg/mL  Outcome: tolerated well, no immediate complications    With patient's consent, and after sterilizing sterilizing the skin of both these with alcohol swab x 4, and spraying with topical anesthetic adequately within to numb both knees  Injection Kenalog 80 mg + 1 ml 1% lidocaine injected into both knees, patient tolerated the procedure very well no complications noted pain is relieved  Procedure, treatment alternatives, risks and benefits explained, specific risks discussed. Immediately prior to procedure a time out was called to verify the correct patient, procedure, equipment, support staff and site/side marked as required.

## 2025-02-24 ENCOUNTER — TELEPHONE (OUTPATIENT)
Dept: RHEUMATOLOGY | Facility: CLINIC | Age: 80
End: 2025-02-24
Payer: MEDICARE

## 2025-02-24 DIAGNOSIS — M54.42 CHRONIC BILATERAL LOW BACK PAIN WITH BILATERAL SCIATICA: ICD-10-CM

## 2025-02-24 DIAGNOSIS — G89.29 CHRONIC BILATERAL LOW BACK PAIN WITH BILATERAL SCIATICA: ICD-10-CM

## 2025-02-24 DIAGNOSIS — M54.41 CHRONIC BILATERAL LOW BACK PAIN WITH BILATERAL SCIATICA: ICD-10-CM

## 2025-02-24 DIAGNOSIS — M48.062 SPINAL STENOSIS OF LUMBAR REGION WITH NEUROGENIC CLAUDICATION: ICD-10-CM

## 2025-02-24 DIAGNOSIS — G89.4 CHRONIC PAIN SYNDROME: ICD-10-CM

## 2025-02-24 RX ORDER — TRAMADOL HYDROCHLORIDE 50 MG/1
50 TABLET ORAL EVERY 8 HOURS PRN
Qty: 90 TABLET | Refills: 0 | Status: CANCELLED | OUTPATIENT
Start: 2025-02-24 | End: 2025-03-26

## 2025-02-25 ENCOUNTER — TELEPHONE (OUTPATIENT)
Dept: PRIMARY CARE | Facility: CLINIC | Age: 80
End: 2025-02-25
Payer: MEDICARE

## 2025-02-25 DIAGNOSIS — M54.42 CHRONIC BILATERAL LOW BACK PAIN WITH BILATERAL SCIATICA: ICD-10-CM

## 2025-02-25 DIAGNOSIS — M48.062 SPINAL STENOSIS OF LUMBAR REGION WITH NEUROGENIC CLAUDICATION: ICD-10-CM

## 2025-02-25 DIAGNOSIS — M54.41 CHRONIC BILATERAL LOW BACK PAIN WITH BILATERAL SCIATICA: ICD-10-CM

## 2025-02-25 DIAGNOSIS — G89.4 CHRONIC PAIN SYNDROME: ICD-10-CM

## 2025-02-25 DIAGNOSIS — G89.29 CHRONIC BILATERAL LOW BACK PAIN WITH BILATERAL SCIATICA: ICD-10-CM

## 2025-02-25 RX ORDER — TRAMADOL HYDROCHLORIDE 50 MG/1
50 TABLET ORAL EVERY 8 HOURS PRN
Qty: 90 TABLET | Refills: 0 | Status: SHIPPED | OUTPATIENT
Start: 2025-02-25 | End: 2025-03-27

## 2025-02-26 ENCOUNTER — APPOINTMENT (OUTPATIENT)
Dept: INTEGRATIVE MEDICINE | Facility: CLINIC | Age: 80
End: 2025-02-26
Payer: MEDICARE

## 2025-02-26 DIAGNOSIS — M54.59 OTHER LOW BACK PAIN: Primary | ICD-10-CM

## 2025-02-26 PROCEDURE — 97810 ACUP 1/> WO ESTIM 1ST 15 MIN: CPT | Performed by: INTERNAL MEDICINE

## 2025-02-26 PROCEDURE — 97811 ACUP 1/> W/O ESTIM EA ADD 15: CPT | Performed by: INTERNAL MEDICINE

## 2025-02-26 NOTE — PROGRESS NOTES
Acupuncture Visit:     Subjective   Patient ID: Hank Rick is a 79 y.o. male who presents for No chief complaint on file.  Pt reported that his back is not feeling too bad.  He shared that acupuncture gives relief and makes pain improved. Monday the stimulator will be placed.          Session Information  Is this acupuncture treatment being billed to the patient's insurance company: Yes  This is visit number: 5  The patient has a total number of visits of: 8  Initial Acupuncture Treatment date: 10/17/24  Last Treatment date: 01/15/25  Name of Insurance Company: Medicare A/B  Name of Supervising Physician: Chayo         Review of Systems         Provider reviewed plan for the acupuncture session, precautions and contraindications. Patient/guardian/hospital staff has given consent to treat with full understanding of what to expect during the session. Before acupuncture began, provider explained to the patient to communicate at any time if the procedure was causing discomfort past their tolerance level. Patient agreed to advise acupuncturist. The acupuncturist counseled the patient on the risks of acupuncture treatment including pain, infection, bleeding, and no relief of pain. The patient was positioned comfortably. There was no evidence of infection at the site of needle insertions.    Objective   Physical Exam         Treatment Plan  Treatment Goals: Pain management    Acupuncture Treatment  Patient Position: Lateral recumbent on a table  Needle Guage: 32 guage /.25/ Purple seirin, 36 guage /.20/ Blue seirin  Body Points - Bilateral: ub 22, 23, 24  Body Points - Right: trochanterx3, gb 30, km adrenalx3  Other Techniques Utilized: TDP Lamp, Topicals  Topicals Description: warming lotion with ravensara eo  TDP Lamp Descripton: scot men  Needle Count In: 13  Needle Count Out: 13  Needle Retention Time (min): 30 minutes  Total Face to Face Time (min): 25 minutes              Assessment/Plan

## 2025-03-17 ENCOUNTER — PATIENT OUTREACH (OUTPATIENT)
Dept: PRIMARY CARE | Facility: CLINIC | Age: 80
End: 2025-03-17
Payer: MEDICARE

## 2025-03-17 DIAGNOSIS — M54.42 CHRONIC BILATERAL LOW BACK PAIN WITH BILATERAL SCIATICA: ICD-10-CM

## 2025-03-17 DIAGNOSIS — G30.9 MIXED ALZHEIMER'S AND VASCULAR DEMENTIA (MULTI): ICD-10-CM

## 2025-03-17 DIAGNOSIS — M54.41 CHRONIC BILATERAL LOW BACK PAIN WITH BILATERAL SCIATICA: ICD-10-CM

## 2025-03-17 DIAGNOSIS — F02.80 MIXED ALZHEIMER'S AND VASCULAR DEMENTIA (MULTI): ICD-10-CM

## 2025-03-17 DIAGNOSIS — F01.50 MIXED ALZHEIMER'S AND VASCULAR DEMENTIA (MULTI): ICD-10-CM

## 2025-03-17 DIAGNOSIS — I10 PRIMARY HYPERTENSION: ICD-10-CM

## 2025-03-17 DIAGNOSIS — G89.29 CHRONIC BILATERAL LOW BACK PAIN WITH BILATERAL SCIATICA: ICD-10-CM

## 2025-03-17 DIAGNOSIS — E78.5 HYPERLIPIDEMIA, UNSPECIFIED HYPERLIPIDEMIA TYPE: ICD-10-CM

## 2025-03-17 NOTE — PROGRESS NOTES
Chronic care management outreach complete. Mr. Rick says that he is alright besides his back pain. He says that his pain is about a 7-8/10. He takes tramadol for the pain.   He has a good appetite. Normal urination. He has loose stools, but does take metamucil.   We reviewed all of his appointments from last month. He mentioned that when he had his MRI of his back, a soft spot was found on his pancreas.  He was told to bring this up to his pcp, but has not heard anything yet. Will route message to PCP.  We reviewed all up coming appointments. Scheduled him for his Medical annual visit 5/7/25 at 8:50am.   He does not monitor his bp, I encouraged him to start to monitoring his bp and to keep a log. He is complaint with his medications.   Reviewed medications. No need for refills at this time.

## 2025-03-18 ENCOUNTER — DOCUMENTATION (OUTPATIENT)
Dept: PRIMARY CARE | Facility: CLINIC | Age: 80
End: 2025-03-18
Payer: MEDICARE

## 2025-03-18 DIAGNOSIS — R93.89 ABNORMAL FINDINGS ON IMAGING TEST: ICD-10-CM

## 2025-03-18 DIAGNOSIS — K86.9 LESION OF PANCREAS (HHS-HCC): Primary | ICD-10-CM

## 2025-03-18 NOTE — PROGRESS NOTES
Talk to the patient, discussed about the CT thoracic spine imaging finding with him  That there is and opacification in the region of the tail of the pancreas which is an incidental finding radiologist recommended MRI CT scan of the pancreas to determine the lesion I placed MRI pancreas requisition and I also talked with the patient and told him that may or may not be approved by the insurance company

## 2025-03-20 ENCOUNTER — APPOINTMENT (OUTPATIENT)
Dept: RADIOLOGY | Facility: HOSPITAL | Age: 80
End: 2025-03-20
Payer: MEDICARE

## 2025-03-24 DIAGNOSIS — M48.062 SPINAL STENOSIS OF LUMBAR REGION WITH NEUROGENIC CLAUDICATION: ICD-10-CM

## 2025-03-24 DIAGNOSIS — G89.29 CHRONIC BILATERAL LOW BACK PAIN WITH BILATERAL SCIATICA: ICD-10-CM

## 2025-03-24 DIAGNOSIS — G89.4 CHRONIC PAIN SYNDROME: ICD-10-CM

## 2025-03-24 DIAGNOSIS — M54.41 CHRONIC BILATERAL LOW BACK PAIN WITH BILATERAL SCIATICA: ICD-10-CM

## 2025-03-24 DIAGNOSIS — M54.42 CHRONIC BILATERAL LOW BACK PAIN WITH BILATERAL SCIATICA: ICD-10-CM

## 2025-03-24 RX ORDER — TRAMADOL HYDROCHLORIDE 50 MG/1
50 TABLET ORAL EVERY 8 HOURS PRN
Qty: 90 TABLET | Refills: 0 | Status: SHIPPED | OUTPATIENT
Start: 2025-03-24 | End: 2025-04-23

## 2025-03-25 DIAGNOSIS — G25.0 ESSENTIAL TREMOR: ICD-10-CM

## 2025-03-27 RX ORDER — PROPRANOLOL HYDROCHLORIDE 60 MG/1
60 CAPSULE, EXTENDED RELEASE ORAL DAILY
Qty: 90 CAPSULE | Refills: 0 | Status: SHIPPED | OUTPATIENT
Start: 2025-03-27

## 2025-03-28 DIAGNOSIS — M48.062 LUMBAR STENOSIS WITH NEUROGENIC CLAUDICATION: ICD-10-CM

## 2025-03-28 RX ORDER — TOPIRAMATE 25 MG/1
25 TABLET ORAL 2 TIMES DAILY
Qty: 180 TABLET | Refills: 1 | Status: SHIPPED | OUTPATIENT
Start: 2025-03-28

## 2025-04-16 DIAGNOSIS — Z12.11 SPECIAL SCREENING FOR MALIGNANT NEOPLASMS, COLON: ICD-10-CM

## 2025-04-16 RX ORDER — POLYETHYLENE GLYCOL 3350, SODIUM SULFATE ANHYDROUS, SODIUM BICARBONATE, SODIUM CHLORIDE, POTASSIUM CHLORIDE 236; 22.74; 6.74; 5.86; 2.97 G/4L; G/4L; G/4L; G/4L; G/4L
4 POWDER, FOR SOLUTION ORAL ONCE
Qty: 4000 ML | Refills: 0 | Status: CANCELLED | OUTPATIENT
Start: 2025-04-16 | End: 2025-04-16

## 2025-04-17 ENCOUNTER — APPOINTMENT (OUTPATIENT)
Dept: OTOLARYNGOLOGY | Facility: CLINIC | Age: 80
End: 2025-04-17
Payer: MEDICARE

## 2025-04-18 ENCOUNTER — PATIENT OUTREACH (OUTPATIENT)
Dept: PRIMARY CARE | Facility: CLINIC | Age: 80
End: 2025-04-18
Payer: MEDICARE

## 2025-04-18 DIAGNOSIS — M54.41 CHRONIC BILATERAL LOW BACK PAIN WITH BILATERAL SCIATICA: ICD-10-CM

## 2025-04-18 DIAGNOSIS — M54.42 CHRONIC BILATERAL LOW BACK PAIN WITH BILATERAL SCIATICA: ICD-10-CM

## 2025-04-18 DIAGNOSIS — I10 PRIMARY HYPERTENSION: ICD-10-CM

## 2025-04-18 DIAGNOSIS — G89.29 CHRONIC BILATERAL LOW BACK PAIN WITH BILATERAL SCIATICA: ICD-10-CM

## 2025-04-18 DIAGNOSIS — E78.5 HYPERLIPIDEMIA, UNSPECIFIED HYPERLIPIDEMIA TYPE: ICD-10-CM

## 2025-04-18 NOTE — PROGRESS NOTES
Chronic care management outreach complete. Mr. Rick reports that he is doing ok besides his back pain. He says that his back pain is constantly a 7-8/10. Takes tramadol for back pain. Scheduled for back surgery 4/23. Had preop visit earlier this month. He feels comfortable with the instructions given and has no questions or concerns regarding his surgery.   Has a good appetite. Normal urination and bowel movements.   Medications reviewed. Complaint with medications. No need for refills.   Reviewed up coming appointments. Rescheduled medicare annual visit to 5/19 from 5/7 per request.   Questions and concerns addressed.

## 2025-04-23 ENCOUNTER — APPOINTMENT (OUTPATIENT)
Dept: GASTROENTEROLOGY | Facility: EXTERNAL LOCATION | Age: 80
End: 2025-04-23
Payer: MEDICARE

## 2025-04-24 ENCOUNTER — TELEPHONE (OUTPATIENT)
Dept: PRIMARY CARE | Facility: CLINIC | Age: 80
End: 2025-04-24
Payer: MEDICARE

## 2025-04-25 ENCOUNTER — APPOINTMENT (OUTPATIENT)
Dept: SLEEP MEDICINE | Facility: CLINIC | Age: 80
End: 2025-04-25
Payer: MEDICARE

## 2025-04-25 DIAGNOSIS — M54.41 CHRONIC BILATERAL LOW BACK PAIN WITH BILATERAL SCIATICA: ICD-10-CM

## 2025-04-25 DIAGNOSIS — J45.909 ASTHMA, UNSPECIFIED ASTHMA SEVERITY, UNSPECIFIED WHETHER COMPLICATED, UNSPECIFIED WHETHER PERSISTENT (HHS-HCC): ICD-10-CM

## 2025-04-25 DIAGNOSIS — M54.42 CHRONIC BILATERAL LOW BACK PAIN WITH BILATERAL SCIATICA: ICD-10-CM

## 2025-04-25 DIAGNOSIS — M48.062 SPINAL STENOSIS OF LUMBAR REGION WITH NEUROGENIC CLAUDICATION: ICD-10-CM

## 2025-04-25 DIAGNOSIS — G89.4 CHRONIC PAIN SYNDROME: ICD-10-CM

## 2025-04-25 DIAGNOSIS — G89.29 CHRONIC BILATERAL LOW BACK PAIN WITH BILATERAL SCIATICA: ICD-10-CM

## 2025-04-25 RX ORDER — TRAMADOL HYDROCHLORIDE 50 MG/1
50 TABLET ORAL EVERY 8 HOURS PRN
Qty: 21 TABLET | Refills: 0 | Status: SHIPPED | OUTPATIENT
Start: 2025-04-25 | End: 2025-05-02

## 2025-04-26 RX ORDER — ALBUTEROL SULFATE 90 UG/1
INHALANT RESPIRATORY (INHALATION)
Qty: 51 G | Refills: 1 | Status: SHIPPED | OUTPATIENT
Start: 2025-04-26

## 2025-04-28 ENCOUNTER — APPOINTMENT (OUTPATIENT)
Dept: PRIMARY CARE | Facility: CLINIC | Age: 80
End: 2025-04-28
Payer: MEDICARE

## 2025-04-30 ENCOUNTER — HOSPITAL ENCOUNTER (OUTPATIENT)
Dept: RADIOLOGY | Facility: HOSPITAL | Age: 80
Discharge: HOME | End: 2025-04-30
Payer: MEDICARE

## 2025-04-30 DIAGNOSIS — R93.89 ABNORMAL FINDINGS ON IMAGING TEST: ICD-10-CM

## 2025-04-30 DIAGNOSIS — K86.9 LESION OF PANCREAS (HHS-HCC): ICD-10-CM

## 2025-05-07 ENCOUNTER — APPOINTMENT (OUTPATIENT)
Dept: PRIMARY CARE | Facility: CLINIC | Age: 80
End: 2025-05-07
Payer: MEDICARE

## 2025-05-18 DIAGNOSIS — Z86.79: ICD-10-CM

## 2025-05-18 DIAGNOSIS — G45.9 TIA (TRANSIENT ISCHEMIC ATTACK): ICD-10-CM

## 2025-05-18 ASSESSMENT — ACTIVITIES OF DAILY LIVING (ADL)
GROCERY_SHOPPING: INDEPENDENT
MANAGING_FINANCES: INDEPENDENT
TAKING_MEDICATION: INDEPENDENT
DOING_HOUSEWORK: INDEPENDENT
DRESSING: INDEPENDENT
BATHING: INDEPENDENT

## 2025-05-18 ASSESSMENT — PATIENT HEALTH QUESTIONNAIRE - PHQ9
2. FEELING DOWN, DEPRESSED OR HOPELESS: NOT AT ALL
SUM OF ALL RESPONSES TO PHQ9 QUESTIONS 1 AND 2: 0
1. LITTLE INTEREST OR PLEASURE IN DOING THINGS: NOT AT ALL

## 2025-05-18 ASSESSMENT — ENCOUNTER SYMPTOMS
LOSS OF SENSATION IN FEET: 0
DEPRESSION: 0
CONSTITUTIONAL NEGATIVE: 1
OCCASIONAL FEELINGS OF UNSTEADINESS: 0

## 2025-05-18 NOTE — PROGRESS NOTES
Patient ID: Hank Rick is a 79 y.o. male who presents for Medicare Annual Wellness Visit Subsequent and Follow-up.    /70 (BP Location: Left arm, Patient Position: Sitting, BP Cuff Size: Large adult)   Pulse 60   Wt 85.5 kg (188 lb 9.6 oz)   SpO2 95%   BMI 24.88 kg/m²     HPI        HTN ON MEDICATIONS CONTROLLED   NO CP, NO SOB , NO PND , NO ORTHOPNEA  NO LEG SWELLING , NO PALPITATION , NO HEADACHE            OBSTRUCTIVE SLEEP APNEA   PT IS ON INSPIRE THERAPY         HYPERLIPIDEMIA   ON STATIN STABLE         BARRETTES ESOPHAGUS   PER EGD DONE ON 05/17/2022  NO DYSPHAGIA , NO WEIGHT LOSS         VERTEBRAL ARTERY STENOSIS /OCCLUSION LEFT   STABLE ON CLOPIDOGREL      VERTEBROBASILAR ARTERY INSUFFICIENCY         PT HAVING SEVERE PAIN WITH HIS BACK   UNABLE TO FUNCTION   PAIN SCALE 7-8/10   TRAMADOL 50MG HALF TABLET   HELPS ALONG WITH TYLENOL 650MG   THOUGH HE IS ONLY TAKING 1-2 TYLENOL NEEDED   NOT TAKING TOPAMAX   NO EXTREMITY WEAKNESS   NO BLADDER OR BOWEL INCONTINENCE      NON SMOKER   NO HX OF ILLICIT DRUG USE      USES ALCOHOL SOCIALLY      PT SEEN DR SARAH CASTILLO   SPINE ORTHO RECOMMENDED   LAMINECTOMY , SPACER REMOVAL AND LUMBAR DISC FUSION L2-L5       PT SEEN PAIN MANAGEMENT   AT  [facility]     S/P IMPLANTATION OF THORACIC PERCUTANEOUS SCS   WITH LEFT SUBCOSTAL IPG ON 04/03/2025   STILL PAIN IS NOT COMPLETELY CONTROLLED          Subjective     Review of Systems   Constitutional: Negative.    All other systems reviewed and are negative.      Objective     Physical Exam  Vitals and nursing note reviewed.   Constitutional:       Appearance: Normal appearance.   Neck:      Vascular: No carotid bruit.   Cardiovascular:      Rate and Rhythm: Normal rate and regular rhythm.      Pulses: Normal pulses.      Heart sounds: Normal heart sounds. No murmur heard.  Pulmonary:      Effort: Pulmonary effort is normal.      Breath sounds: Normal breath sounds.   Abdominal:      Palpations: There is no mass.    Musculoskeletal:      Right lower leg: No edema.      Left lower leg: No edema.   Skin:     Capillary Refill: Capillary refill takes more than 3 seconds.   Neurological:      General: No focal deficit present.      Mental Status: He is oriented to person, place, and time. Mental status is at baseline.   Psychiatric:         Mood and Affect: Mood normal.         Behavior: Behavior normal.         Thought Content: Thought content normal.         Judgment: Judgment normal.       Lab Results   Component Value Date    WBC 6.9 04/19/2024    HGB 12.4 (L) 04/19/2024    HCT 39.2 (L) 04/19/2024    MCV 97 04/19/2024     04/19/2024           Problem List Items Addressed This Visit       Vertebrobasilar artery insufficiency    Primary hypertension    Rodriguez's esophagus    Chronic insomnia    Cerebrovascular accident (CVA) (Multi)    Primary osteoarthritis of both knees    WALKER (obstructive sleep apnea)    Other nonthrombocytopenic purpura     Other Visit Diagnoses         Medicare annual wellness visit, subsequent  (Chronic)   -  Primary      Opioid use        Relevant Medications    traMADol (Ultram) 50 mg tablet    Other Relevant Orders    Opiate/Opioid/Benzo Prescription Compliance      Opioid use agreement exists        Relevant Medications    traMADol (Ultram) 50 mg tablet    Other Relevant Orders    Opiate/Opioid/Benzo Prescription Compliance      Other chronic pain        Relevant Medications    traMADol (Ultram) 50 mg tablet      Insomnia, unspecified type        Relevant Medications    traZODone (Desyrel) 50 mg tablet                A/P         OARRS REVIEWED  NO RED FLAG   CONTROLLED SUBSTANCES AGREEMENT UPTODAY  OPIOID NOTE FORM COMPLETED   TRAMADOL 50MG 1 TAB PO BID QNTY 60 FILLED   URINE DRUG SCREEN   TRAZODONE 50MG 1 TABLET EVERY DAY FILLED         OARRS:  No data recorded  I have personally reviewed the OARRS report for Hank Rick. I have considered the risks of abuse, dependence, addiction and  diversion and I believe that it is clinically appropriate for Hank Rick to be prescribed this medication    Is the patient prescribed a combination of a benzodiazepine and opioid? NO     Last Urine Drug Screen / ordered today: Yes  Recent Results (from the past 8760 hours)   Confirmation Opiate/Opioid/Benzo Prescription Compliance    Collection Time: 10/15/24  4:03 PM   Result Value Ref Range    Clonazepam <25 <25 ng/mL    7-Aminoclonazepam <25 <25 ng/mL    Alprazolam <25 <25 ng/mL    Alpha-Hydroxyalprazolam <25 <25 ng/mL    Midazolam <25 <25 ng/mL    Alpha-Hydroxymidazolam <25 <25 ng/mL    Chlordiazepoxide <25 <25 ng/mL    Diazepam <25 <25 ng/mL    Nordiazepam <25 <25 ng/mL    Temazepam <25 <25 ng/mL    Oxazepam <25 <25 ng/mL    Lorazepam <25 <25 ng/mL    Methadone <25 <25 ng/mL    EDDP <25 <25 ng/mL    6-Acetylmorphine <25 <25 ng/mL    Codeine <50 <50 ng/mL    Hydrocodone <25 <25 ng/mL    Hydromorphone <25 <25 ng/mL    Morphine  <50 <50 ng/mL    Norhydrocodone <25 <25 ng/mL    Noroxycodone <25 <25 ng/mL    Oxycodone <25 <25 ng/mL    Oxymorphone <25 <25 ng/mL    Fentanyl <2.5 <2.5 ng/mL    Norfentanyl <2.5 <2.5 ng/mL    Tramadol >1,000 (H) <50 ng/mL    O-Desmethyltramadol >1,000 (H) <50 ng/mL    Zolpidem <25 <25 ng/mL    Zolpidem Metabolite (ZCA) <25 <25 ng/mL   Screen Opiate/Opioid/Benzo Prescription Compliance    Collection Time: 10/15/24  4:03 PM   Result Value Ref Range    Creatinine, Urine Random 54.8 20.0 - 370.0 mg/dL    Amphetamine Screen, Urine Presumptive Negative Presumptive Negative    Barbiturate Screen, Urine Presumptive Negative Presumptive Negative    Cannabinoid Screen, Urine Presumptive Negative Presumptive Negative    Cocaine Metabolite Screen, Urine Presumptive Negative Presumptive Negative    PCP Screen, Urine Presumptive Negative Presumptive Negative     Results are as expected.         Controlled Substance Agreement:  Date of the Last Agreement:  10/15/2024  Reviewed Controlled Substance  Agreement including but not limited to the benefits, risks, and alternatives to treatment with a Controlled Substance medication(s).    Opioids:  What is the patient's goal of therapy?  TO HELP WITH THE PAIN   Is this being achieved with current treatment? YES    I have calculated the patient's Morphine Dose Equivalent (MED):   I have considered referral to Pain Management and/or a specialist, and do not feel it is necessary at this time.    I feel that it is clinically indicated to continue this current medication regimen after consideration of alternative therapies, and other non-opioid treatment.    Opioid Risk Screening:  No data recorded    Pain Assessment:  No data recorded

## 2025-05-19 ENCOUNTER — APPOINTMENT (OUTPATIENT)
Dept: PRIMARY CARE | Facility: CLINIC | Age: 80
End: 2025-05-19
Payer: MEDICARE

## 2025-05-19 ENCOUNTER — APPOINTMENT (OUTPATIENT)
Dept: OTOLARYNGOLOGY | Facility: CLINIC | Age: 80
End: 2025-05-19
Payer: MEDICARE

## 2025-05-19 VITALS
OXYGEN SATURATION: 95 % | WEIGHT: 188.6 LBS | HEART RATE: 60 BPM | BODY MASS INDEX: 24.88 KG/M2 | SYSTOLIC BLOOD PRESSURE: 131 MMHG | DIASTOLIC BLOOD PRESSURE: 70 MMHG

## 2025-05-19 VITALS — TEMPERATURE: 97 F | HEIGHT: 73 IN | WEIGHT: 189.7 LBS | BODY MASS INDEX: 25.14 KG/M2

## 2025-05-19 DIAGNOSIS — K22.70 BARRETT'S ESOPHAGUS WITHOUT DYSPLASIA: ICD-10-CM

## 2025-05-19 DIAGNOSIS — J38.7 PRESBYLARYNGES: ICD-10-CM

## 2025-05-19 DIAGNOSIS — G47.00 INSOMNIA, UNSPECIFIED TYPE: ICD-10-CM

## 2025-05-19 DIAGNOSIS — R49.0 VOICE HOARSENESS: Primary | ICD-10-CM

## 2025-05-19 DIAGNOSIS — R47.89 BREATHY VOICE QUALITY: ICD-10-CM

## 2025-05-19 DIAGNOSIS — G47.33 OSA (OBSTRUCTIVE SLEEP APNEA): ICD-10-CM

## 2025-05-19 DIAGNOSIS — R49.8 VOICE FATIGUE: ICD-10-CM

## 2025-05-19 DIAGNOSIS — Z79.891 OPIOID USE AGREEMENT EXISTS: ICD-10-CM

## 2025-05-19 DIAGNOSIS — R49.8 OTHER VOICE AND RESONANCE DISORDERS: ICD-10-CM

## 2025-05-19 DIAGNOSIS — I10 PRIMARY HYPERTENSION: ICD-10-CM

## 2025-05-19 DIAGNOSIS — I63.9 CEREBROVASCULAR ACCIDENT (CVA), UNSPECIFIED MECHANISM (MULTI): ICD-10-CM

## 2025-05-19 DIAGNOSIS — F51.04 CHRONIC INSOMNIA: ICD-10-CM

## 2025-05-19 DIAGNOSIS — G89.29 OTHER CHRONIC PAIN: ICD-10-CM

## 2025-05-19 DIAGNOSIS — Z00.00 MEDICARE ANNUAL WELLNESS VISIT, SUBSEQUENT: Primary | Chronic | ICD-10-CM

## 2025-05-19 DIAGNOSIS — G45.0 VERTEBROBASILAR ARTERY INSUFFICIENCY: ICD-10-CM

## 2025-05-19 DIAGNOSIS — M17.0 PRIMARY OSTEOARTHRITIS OF BOTH KNEES: ICD-10-CM

## 2025-05-19 DIAGNOSIS — D69.2 OTHER NONTHROMBOCYTOPENIC PURPURA: ICD-10-CM

## 2025-05-19 DIAGNOSIS — F11.90 OPIOID USE: ICD-10-CM

## 2025-05-19 PROCEDURE — 3078F DIAST BP <80 MM HG: CPT | Performed by: INTERNAL MEDICINE

## 2025-05-19 PROCEDURE — 1170F FXNL STATUS ASSESSED: CPT | Performed by: INTERNAL MEDICINE

## 2025-05-19 PROCEDURE — 1160F RVW MEDS BY RX/DR IN RCRD: CPT | Performed by: INTERNAL MEDICINE

## 2025-05-19 PROCEDURE — 99214 OFFICE O/P EST MOD 30 MIN: CPT | Performed by: OTOLARYNGOLOGY

## 2025-05-19 PROCEDURE — 31579 LARYNGOSCOPY TELESCOPIC: CPT | Performed by: OTOLARYNGOLOGY

## 2025-05-19 PROCEDURE — 99214 OFFICE O/P EST MOD 30 MIN: CPT | Mod: 25 | Performed by: OTOLARYNGOLOGY

## 2025-05-19 PROCEDURE — G0439 PPPS, SUBSEQ VISIT: HCPCS | Performed by: INTERNAL MEDICINE

## 2025-05-19 PROCEDURE — 1036F TOBACCO NON-USER: CPT | Performed by: INTERNAL MEDICINE

## 2025-05-19 PROCEDURE — 1159F MED LIST DOCD IN RCRD: CPT | Performed by: INTERNAL MEDICINE

## 2025-05-19 PROCEDURE — 1126F AMNT PAIN NOTED NONE PRSNT: CPT | Performed by: OTOLARYNGOLOGY

## 2025-05-19 PROCEDURE — 3075F SYST BP GE 130 - 139MM HG: CPT | Performed by: INTERNAL MEDICINE

## 2025-05-19 PROCEDURE — 99214 OFFICE O/P EST MOD 30 MIN: CPT | Performed by: INTERNAL MEDICINE

## 2025-05-19 RX ORDER — TRAMADOL HYDROCHLORIDE 50 MG/1
50 TABLET, FILM COATED ORAL EVERY 12 HOURS
Qty: 60 TABLET | Refills: 0 | Status: SHIPPED | OUTPATIENT
Start: 2025-05-19 | End: 2025-06-18

## 2025-05-19 RX ORDER — TRAZODONE HYDROCHLORIDE 50 MG/1
50 TABLET ORAL NIGHTLY
Qty: 90 TABLET | Refills: 1 | Status: SHIPPED | OUTPATIENT
Start: 2025-05-19

## 2025-05-19 RX ORDER — CLOPIDOGREL BISULFATE 75 MG/1
75 TABLET ORAL DAILY
Qty: 90 TABLET | Refills: 3 | Status: SHIPPED | OUTPATIENT
Start: 2025-05-19

## 2025-05-19 ASSESSMENT — ACTIVITIES OF DAILY LIVING (ADL)
GROCERY_SHOPPING: INDEPENDENT
MANAGING_FINANCES: INDEPENDENT
DOING_HOUSEWORK: INDEPENDENT
TAKING_MEDICATION: INDEPENDENT
DOING_HOUSEWORK: INDEPENDENT
BATHING: INDEPENDENT
TAKING_MEDICATION: INDEPENDENT
GROCERY_SHOPPING: INDEPENDENT
DRESSING: INDEPENDENT
DRESSING: INDEPENDENT
BATHING: INDEPENDENT
MANAGING_FINANCES: INDEPENDENT

## 2025-05-19 ASSESSMENT — PATIENT HEALTH QUESTIONNAIRE - PHQ9
SUM OF ALL RESPONSES TO PHQ9 QUESTIONS 1 AND 2: 0
SUM OF ALL RESPONSES TO PHQ9 QUESTIONS 1 AND 2: 0
2. FEELING DOWN, DEPRESSED OR HOPELESS: NOT AT ALL
2. FEELING DOWN, DEPRESSED OR HOPELESS: NOT AT ALL
1. LITTLE INTEREST OR PLEASURE IN DOING THINGS: NOT AT ALL
1. LITTLE INTEREST OR PLEASURE IN DOING THINGS: NOT AT ALL
2. FEELING DOWN, DEPRESSED OR HOPELESS: NOT AT ALL
1. LITTLE INTEREST OR PLEASURE IN DOING THINGS: NOT AT ALL
2. FEELING DOWN, DEPRESSED OR HOPELESS: NOT AT ALL
SUM OF ALL RESPONSES TO PHQ9 QUESTIONS 1 AND 2: 0
1. LITTLE INTEREST OR PLEASURE IN DOING THINGS: NOT AT ALL
SUM OF ALL RESPONSES TO PHQ9 QUESTIONS 1 AND 2: 0

## 2025-05-19 ASSESSMENT — PAIN SCALES - GENERAL: PAINLEVEL_OUTOF10: 0-NO PAIN

## 2025-05-19 NOTE — PROGRESS NOTES
ASSESSMENT AND PLAN:   Hank Rick is a 79 y.o. male with a history of prior thyroplasty.     Today's examination to include stroboscopy demonstrates thyroplasty implants are still visible, however, a small gap seen on exam.     I recommend the patient work with our speech and language pathology team. The patient has worked successfully with Enrike in the past. The patient will follow-up if issues persist that are not addressed by therapy.        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  Developed a small gap and mild voice change after being intubated.  Planned SLP therapy.  + MTD and poor respiratory drive.     At that time he had a good vocal quality.     Also with Hx of obstructive sleep apnea status post inspire January 2024.  He is thrilled with the result.  He is no longer snoring and is sleeping well.  He awakes refreshed.  He is scheduled for an HSAT.  He is working with Dr. Lopez.     HISTORY OF PRESENT ILLNESS:  Hank Rick is a 79 y.o. male presenting for a follow-up visit with me for evaluation, raspy voice.    Bilateral presbylarynges s/p thyroplasty .Recently intubated and developed a small gap causing worsing quality to his voice. History of obstructive sleep apnea.     The patient reports his voice has been raspy since he was intubated or possibly prior. He denies swallow problems.       He had a spinal stimulator placed in April 2025.     History of Present Illness       Prior History:   Last seen 9/28/23 Assessment and Plan:  This is a 78 year old male with history of bilateral presbylarynges s/p bilateral thyroplasty 1 year ago. He was doing well until 3 months without significant changes to health. He noted fatigue with voice. He reports that currently his voice sounds good,   He has good closure with large implant in the left partially visualized  in the infraglottic space. It does not appear over sized. He has a mild gap that is small and MTD that is creating the effort related changes he feels. He has a good vocal quality. His right implant does not feel displaced via neck exam but he was intubated since our last visit.   He will work with our SLP on MTD and respiratory drive. Muscle strength  can be effective in improving airflow  I will see him back as needed - consider imaging if no improvement.     On 10/27/22:  This is a 76 year old male with history of bilateral presbylarynges s/p bilateral thyroplasty. He is doing well with less effort and improved voice. Incision looks adequate.  Stroboscopy shows improved closure pattern. He has sensation of globus and throat clearing due to PND. He was provided with sinus rinse bottle today which he was requested to trial. He is complete with his therapy and he will follow up with us as needed     On 9/22/22:  This is a 76 year old male, POV 1 with a history of bilateral presbylarynges s/p bilateral thyroplasty. He is here for drain removal and inspection of incision. He had mild bloody drainage on the gauze that was removed. No significant drainage after waiting 5-10 minutes. The Penrose drain was removed and the incision was covered in Steri-Strips. THe patient appeared to be in good spirit with a good quality of voice with a mild roughness. He was instructed on the importance of voice rest. He will followup with me in several weeks. He will follow up with SLP in 1 week. He was instructed on the care again.     On 9/21/22:  S/p bilateral thyroplasty and laryngoscopy.    On 5/3/22:  This is a 76 year old male with a history of bilateral presbylarynges and mild left vocal cord weakness with voice concerns that improved after injection on 02/15/22. Unfortunately he had return of symptoms 2 months after injection. This was after our scheduled visit. We discussed thyroplasty as his voice concerns have been  persistent for over a year.   He is interested in doing so The risks, indications, and complications were discussed with the patient, and he would like to proceed. We discussed performing this ans an outpatient and de lives in [place], and he would be willing to do this on a Friday.     On 2/15/22:  This is a 76 year old male with a history of bilateral presbylarynges and mild left vocal cord weakness, who underwent injection of prolaryn gel. He tolerated a 0.5 cc injection of prolaryn gel via transoral approach. I anticipate improvement in vocal quality to include reduced effort. He will follow up in one months time to determine impact. He has significant voice demand. He may benefit from a thyroplasty in the future if he benefits from the injection.     On 12/21/21:  This is a 76 year old male with a history of voice changes that have been ongoing for several years who was seen by Enrike at the beginning of this year with improved with therapy x4 sessions. He has noticed worsening of voice over the last several months.   Today's examination to include stroboscopy demonstrates a left vocal cord weakness and bilateral presbylarynges who may benefit from bilateral vocal cord injections. He has less muscle mass on the left.   We discussed vocal cord injection v thyroplasty. He is interested in the vocal cord injection medialization as a trial. He will perform this and we will consider thyroplasty in the future if he so desires. He will be seen by SLP to review strategies prior to the next injection. Discussed care plan with SLP.        Past Medical History  He has a past medical history of Allergic (1953), Arthritis (2005), Asthma (1960), Cataract, COVID-19 (11/15/2022), Esophageal obstruction (05/15/2022), Essential tremor (12/29/2022), GERD (gastroesophageal reflux disease) (2012), Hard of hearing, Hearing aid worn, Hyperlipidemia, Hypertension, Memory impairment, Olecranon bursitis, left elbow (04/26/2016),  "Personal history of diseases of the blood and blood-forming organs and certain disorders involving the immune mechanism (05/17/2022), Personal history of other diseases of the digestive system (11/13/2014), Personal history of other diseases of the digestive system (01/14/2021), Personal history of other specified conditions (11/29/2018), Rash and other nonspecific skin eruption (11/02/2021), Salmonella enteritis, Sleep apnea, TIA (transient ischemic attack), and Vertebral artery stenosis.    He has no past medical history of Stroke (Multi). Surgical History  He has a past surgical history that includes Other surgical history (01/07/2015); Tonsillectomy (11/18/2014); Refractive surgery (03/05/2014); Other surgical history (03/05/2014); MR angio head wo IV contrast (05/09/2023); MR angio neck wo IV contrast (05/09/2023); CT angio head w and wo IV contrast (05/12/2023); CT angio neck (05/12/2023); Other surgical history; Back surgery (2025); Cataract extraction; Total shoulder arthroplasty (Right); Hernia repair (2015); and Circumcision, primary (1945).   Social History  He reports that he has never smoked. He has never used smokeless tobacco. He reports current alcohol use of about 8.0 standard drinks of alcohol per week. He reports that he does not currently use drugs after having used the following drugs: Cocaine and Marijuana. Allergies  Gabapentin and Pollen extracts     Family History  Family History[1]    Review of Systems  All 10 systems were reviewed and negative except for above.      Last Recorded Vitals  Temperature 36.1 °C (97 °F), height 1.854 m (6' 1\"), weight 86 kg (189 lb 11.2 oz).    Physical Exam  ENT Physical Exam   ENT Physical Exam  Constitutional  Appearance: patient appears well-developed and well-nourished,  Head and Face  Appearance: head appears normal and face appears normal;  Ear  Auricles: right auricle normal; left auricle normal;  Nose  External Nose: nares patent bilaterally; right " "septal spur  Oral Cavity/Oropharynx  Lips: normal; adenoids and tonsils absent.   Neck  Neck: neck normal; neck palpation normal;  Respiratory  Inspection: no retractions visible;  Cardiovascular  Inspection: no peripheral edema present;  Neurovestibular  Mental Status: alert and oriented;  Psychiatric: mood normal;  Cranial Nerves: cranial nerves intact;     Physical Exam       Relevant Results  Results       Procedures   Flexible Laryngoscopy w/ Videostroboscopy    VOICE AND SPEECH CHARACTERISTICS:  Normal spoken speech, (+) mild dysphonia, (+) mild roughness, no breathiness, (+) mild asthenia, (+) mild strain.    Additional Voice Characteristics: {Additional Voice Characteristics (Optional):92035}  Pitch: {Pitch:37348}  {acoustic measures:78953::\" f o: ***  Hz, MPT: *** sec, s/z ratio: *** seconds, f Range: *** Hz. \"}.    Intelligibility: normal.   Resonance: balanced.   Vocal Loudness: normal.   Breath Support: normal.    PROCEDURE:    Indications: voice change and s/p thyroplasty   PROCEDURE NOTE: FLEXIBLE LARYNGOSCOPY WITH STROBOSCOPY  I recommended a flexible laryngoscopy with stroboscopy based on PE findings, and/or concern for mucosal wave details based upon history and/or for issues associated with hyperreflexic gag on mirror exam concerning for pathology. Risks, benefits, and alternatives were explained. The patient wishes to proceed and gives verbal consent.   Patient is seated in the exam chair. After adequate topical anesthesia, I advance the flexible endoscope. The examination included evaluation of the hall, vallecula, base of tongue, pyriforms, post-cricoid area, larynx and immediate subglottis.    Reflux Finding Score  Subglottic edema: Present  Thick Mucus: Absent   Granuloma: Absent   Ventricular Obliteration: Partial   Erythema/Hyperemia: Absent   IA Thickening: Mild   TVC Edema: Absent   Diffuse Laryngitis: Absent     Gross Arytenoid Movement: limited adduction left.  Arytenoid Height: normal. "   Supraglottic Tension: ant/post.    Symmetry: asymmetry.   Amplitude: excessive: right.  Phase Closure: in-phase.  Periodicity: aperiodic.  Mucosal Wave Lateral Excursion/Secondary Wave: Bilateral Vocal Cord: no restriction - wave moved more than ½ the width of the vocal fold.    Closure: bowing.      Time Spent  Prep time on day of patient encounter: 10 minutes  Time spent directly with patient, family or caregiver: 15 minutes  Additional Time Spent on Patient Care Activities/Discussion with SLP re care plan: 5 minutes  Documentation Time: 10 minutes  Other Time Spent: 0 minutes  Total: 40 minutes     Scribe Attestation  By signing my name below, I, Denia Leung , Scribe   attest that this documentation has been prepared under the direction and in the presence of Anastacio Dee MD.    This medical note was created with the assistance of artificial intelligence (AI) for documentation purposes. The content has been reviewed and confirmed by the healthcare provider for accuracy and completeness. Patient consented to the use of audio recording and use of AI during their visit.           [1]   Family History  Problem Relation Name Age of Onset    Multiple sclerosis Mother Patricia Rick     Accidental death Mother Patricia Rick     Hearing loss Mother Patricia Rick     Colon cancer Father Ciaran Rick     Cancer Father Ciaran Rick       thyroplasty   PROCEDURE NOTE: FLEXIBLE LARYNGOSCOPY WITH STROBOSCOPY  I recommended a flexible laryngoscopy with stroboscopy based on PE findings, and/or concern for mucosal wave details based upon history and/or for issues associated with hyperreflexic gag on mirror exam concerning for pathology. Risks, benefits, and alternatives were explained. The patient wishes to proceed and gives verbal consent.   Patient is seated in the exam chair. After adequate topical anesthesia, I advance the flexible endoscope. The examination included evaluation of the hall, vallecula, base of tongue, pyriforms, post-cricoid area, larynx and immediate subglottis.    Reflux Finding Score  Subglottic edema: Present  Thick Mucus: Absent   Granuloma: Absent   Ventricular Obliteration: Partial   Erythema/Hyperemia: Absent   IA Thickening: Mild   TVC Edema: Absent   Diffuse Laryngitis: Absent     Gross Arytenoid Movement: limited adduction left.  Arytenoid Height: normal.   Supraglottic Tension: ant/post.    Symmetry: asymmetry.   Amplitude: excessive: right.  Phase Closure: in-phase.  Periodicity: aperiodic.  Mucosal Wave Lateral Excursion/Secondary Wave: Bilateral Vocal Cord: no restriction - wave moved more than ½ the width of the vocal fold.    Closure: bowing.      Time Spent  Prep time on day of patient encounter: 10 minutes  Time spent directly with patient, family or caregiver: 15 minutes  Additional Time Spent on Patient Care Activities/Discussion with SLP re care plan: 5 minutes  Documentation Time: 10 minutes  Other Time Spent: 0 minutes  Total: 40 minutes     Scribe Attestation  By signing my name below, IDenia , Salome   attest that this documentation has been prepared under the direction and in the presence of Anastacio Dee MD.    This medical note was created with the assistance of artificial intelligence (AI) for documentation purposes. The content has been reviewed and confirmed by the healthcare provider for  accuracy and completeness. Patient consented to the use of audio recording and use of AI during their visit.           [1]   Family History  Problem Relation Name Age of Onset    Multiple sclerosis Mother Patricia Rick     Accidental death Mother Patricia Rick     Hearing loss Mother Patricia Rick     Colon cancer Father Ciaran Rick     Cancer Father Ciaran Rick

## 2025-05-20 ENCOUNTER — DOCUMENTATION (OUTPATIENT)
Dept: PRIMARY CARE | Facility: CLINIC | Age: 80
End: 2025-05-20

## 2025-05-20 ENCOUNTER — APPOINTMENT (OUTPATIENT)
Dept: GASTROENTEROLOGY | Facility: EXTERNAL LOCATION | Age: 80
End: 2025-05-20
Payer: MEDICARE

## 2025-05-24 ENCOUNTER — HOSPITAL ENCOUNTER (OUTPATIENT)
Dept: RADIOLOGY | Facility: HOSPITAL | Age: 80
End: 2025-05-24
Payer: MEDICARE

## 2025-05-27 ENCOUNTER — HOSPITAL ENCOUNTER (OUTPATIENT)
Dept: RADIOLOGY | Facility: HOSPITAL | Age: 80
Discharge: HOME | End: 2025-05-27
Payer: MEDICARE

## 2025-05-27 VITALS — HEIGHT: 73 IN | WEIGHT: 189 LBS | BODY MASS INDEX: 25.05 KG/M2

## 2025-05-27 DIAGNOSIS — Z12.11 COLON CANCER SCREENING: ICD-10-CM

## 2025-05-27 PROCEDURE — 74182 MRI ABDOMEN W/CONTRAST: CPT

## 2025-05-27 PROCEDURE — A9575 INJ GADOTERATE MEGLUMI 0.1ML: HCPCS | Performed by: INTERNAL MEDICINE

## 2025-05-27 PROCEDURE — 2550000001 HC RX 255 CONTRASTS: Performed by: INTERNAL MEDICINE

## 2025-05-27 RX ORDER — POLYETHYLENE GLYCOL 3350, SODIUM SULFATE ANHYDROUS, SODIUM BICARBONATE, SODIUM CHLORIDE, POTASSIUM CHLORIDE 236; 22.74; 6.74; 5.86; 2.97 G/4L; G/4L; G/4L; G/4L; G/4L
POWDER, FOR SOLUTION ORAL
Qty: 4000 ML | Refills: 0 | Status: SHIPPED | OUTPATIENT
Start: 2025-05-27 | End: 2025-05-28 | Stop reason: ALTCHOICE

## 2025-05-27 RX ORDER — GADOTERATE MEGLUMINE 376.9 MG/ML
0.2 INJECTION INTRAVENOUS
Status: COMPLETED | OUTPATIENT
Start: 2025-05-27 | End: 2025-05-27

## 2025-05-27 RX ADMIN — GADOTERATE MEGLUMINE 17 ML: 376.9 INJECTION INTRAVENOUS at 14:50

## 2025-05-28 ENCOUNTER — APPOINTMENT (OUTPATIENT)
Dept: GASTROENTEROLOGY | Facility: EXTERNAL LOCATION | Age: 80
End: 2025-05-28
Payer: MEDICARE

## 2025-05-28 DIAGNOSIS — Z12.11 COLON CANCER SCREENING: Primary | ICD-10-CM

## 2025-05-28 DIAGNOSIS — D12.3 BENIGN NEOPLASM OF TRANSVERSE COLON: ICD-10-CM

## 2025-05-28 DIAGNOSIS — Z86.0100 PERSONAL HISTORY OF COLON POLYPS, UNSPECIFIED: ICD-10-CM

## 2025-05-28 DIAGNOSIS — Z86.0101 HISTORY OF ADENOMATOUS POLYP OF COLON: ICD-10-CM

## 2025-05-28 PROCEDURE — 88305 TISSUE EXAM BY PATHOLOGIST: CPT | Performed by: PATHOLOGY

## 2025-05-28 PROCEDURE — 0753T DGTZ GLS MCRSCP SLD LEVEL IV: CPT

## 2025-05-28 PROCEDURE — 88305 TISSUE EXAM BY PATHOLOGIST: CPT

## 2025-05-28 PROCEDURE — 45385 COLONOSCOPY W/LESION REMOVAL: CPT | Performed by: INTERNAL MEDICINE

## 2025-05-28 NOTE — PROGRESS NOTES
Colonoscopy performed today 5/28/2025 at the [facility] ([facility]).  See procedure report(s) under Media tab.

## 2025-05-30 ENCOUNTER — LAB REQUISITION (OUTPATIENT)
Dept: LAB | Facility: HOSPITAL | Age: 80
End: 2025-05-30
Payer: MEDICARE

## 2025-05-30 DIAGNOSIS — G25.0 ESSENTIAL TREMOR: ICD-10-CM

## 2025-05-30 DIAGNOSIS — M48.062 LUMBAR STENOSIS WITH NEUROGENIC CLAUDICATION: ICD-10-CM

## 2025-05-30 RX ORDER — TOPIRAMATE 25 MG/1
25 TABLET, FILM COATED ORAL 2 TIMES DAILY
Qty: 180 TABLET | Refills: 3 | Status: SHIPPED | OUTPATIENT
Start: 2025-05-30

## 2025-06-02 RX ORDER — PROPRANOLOL HYDROCHLORIDE 60 MG/1
60 CAPSULE, EXTENDED RELEASE ORAL DAILY
Qty: 90 CAPSULE | Refills: 3 | Status: SHIPPED | OUTPATIENT
Start: 2025-06-02

## 2025-06-03 ENCOUNTER — TELEPHONE (OUTPATIENT)
Dept: PRIMARY CARE | Facility: CLINIC | Age: 80
End: 2025-06-03
Payer: MEDICARE

## 2025-06-04 DIAGNOSIS — G47.00 INSOMNIA, UNSPECIFIED TYPE: ICD-10-CM

## 2025-06-04 RX ORDER — TRAZODONE HYDROCHLORIDE 50 MG/1
TABLET ORAL
Qty: 180 TABLET | Refills: 1 | Status: SHIPPED | OUTPATIENT
Start: 2025-06-04

## 2025-06-12 LAB
LABORATORY COMMENT REPORT: NORMAL
PATH REPORT.FINAL DX SPEC: NORMAL
PATH REPORT.GROSS SPEC: NORMAL
PATH REPORT.RELEVANT HX SPEC: NORMAL
PATH REPORT.TOTAL CANCER: NORMAL

## 2025-06-16 ENCOUNTER — PATIENT OUTREACH (OUTPATIENT)
Dept: PRIMARY CARE | Facility: CLINIC | Age: 80
End: 2025-06-16
Payer: MEDICARE

## 2025-06-16 DIAGNOSIS — I10 PRIMARY HYPERTENSION: ICD-10-CM

## 2025-06-16 DIAGNOSIS — E78.5 HYPERLIPIDEMIA, UNSPECIFIED HYPERLIPIDEMIA TYPE: ICD-10-CM

## 2025-06-16 DIAGNOSIS — M48.062 LUMBAR STENOSIS WITH NEUROGENIC CLAUDICATION: ICD-10-CM

## 2025-06-16 NOTE — PROGRESS NOTES
Chronic care management outreach complete. Mr. Rick reports that he is doing well since the implantation of his neurotransmitter. He reports that his lower back pain has improved. Today's pain is a 3/10.  Good appetite. Having frequent urination. Followed by urology. Completed coloscopy 5/28. Up coming appointments reviewed. Medications reviewed. No need for refills.    Care Management Monthly Outreach  Chart review completed  Confirmation of at least 2 patient identifiers  Change in insurance? No    Has patient been to ER/Urgent Care since last outreach? No    Last Office Visit with PCP: 5/19/2025   Next Office Visit with PCP: Visit date not found   APC Collaboration: n/a    Chronic Conditions and Outreach Summary:   Primary hypertension    Lumbar stenosis with neurogenic claudication    Hyperlipidemia, unspecified hyperlipidemia type    Medications:   Are there medication changes since last visit? No  Refills needed? No    Social Drivers of Health: Deferred  Care Gaps Addressed? Deferred  Care Plan addressed: Yes    Upcoming Appointments:   Future Appointments       Date / Time Provider Department Dept Phone    9/5/2025 8:30 AM (Arrive by 8:15 AM) Stan Lopez MD [facility] [phone]    9/5/2025 10:20 AM Olga lAaniz MD [facility]  Arrive at: Your Home [phone]    10/8/2025 8:00 AM [facility] RN 1 [facility] [phone]    10/8/2025 8:30 AM Aleisha Duron MD [facility] [phone]          Blood Pressures Reviewed  BP Readings from Last 3 Encounters:   05/19/25 131/70   02/19/25 140/80   01/06/25 110/68     Labs Reviewed:  Lab Results   Component Value Date    CREATININE 1.19 04/19/2024    GLUCOSE 137 (H) 04/19/2024    ALKPHOS 67 04/19/2024    K 4.0 04/19/2024    PROT 6.5 04/19/2024     (L) 04/19/2024    CALCIUM 8.9 04/19/2024    AST 16 04/19/2024    ALT 17 04/19/2024    BUN 30 (H) 04/19/2024    MG 1.80  01/13/2024    PHOS 3.9 08/24/2023    GFRMALE 69 08/31/2023     Lab Results   Component Value Date    TRIG 91 10/15/2024    CHOL 133 10/15/2024    LDLCALC 66 10/15/2024    HDL 48.5 10/15/2024     Lab Results   Component Value Date    HGBA1C 5.4 02/12/2025    HGBA1C 5.6 10/15/2024    HGBA1C 5.6 08/22/2023     Lab Results   Component Value Date    WBC 6.9 04/19/2024    RBC 4.06 (L) 04/19/2024    HGB 12.4 (L) 04/19/2024     04/19/2024   No other concerns at this time.  Agreeable to continue monthly outreaches.  Encouraged to call if questions or concerns arise.    Eladio Rabago

## 2025-06-20 DIAGNOSIS — G89.29 OTHER CHRONIC PAIN: ICD-10-CM

## 2025-06-20 DIAGNOSIS — Z79.891 OPIOID USE AGREEMENT EXISTS: ICD-10-CM

## 2025-06-20 DIAGNOSIS — F11.90 OPIOID USE: ICD-10-CM

## 2025-06-20 RX ORDER — TRAMADOL HYDROCHLORIDE 50 MG/1
50 TABLET, FILM COATED ORAL EVERY 12 HOURS
Qty: 60 TABLET | Refills: 0 | Status: SHIPPED | OUTPATIENT
Start: 2025-06-20 | End: 2025-07-20

## 2025-07-03 DIAGNOSIS — J30.9 CHRONIC ALLERGIC RHINITIS: ICD-10-CM

## 2025-07-04 RX ORDER — AZELASTINE 1 MG/ML
2 SPRAY, METERED NASAL 2 TIMES DAILY
Qty: 90 ML | Refills: 1 | Status: SHIPPED | OUTPATIENT
Start: 2025-07-04

## 2025-07-16 ENCOUNTER — PATIENT OUTREACH (OUTPATIENT)
Dept: PRIMARY CARE | Facility: CLINIC | Age: 80
End: 2025-07-16
Payer: MEDICARE

## 2025-07-16 DIAGNOSIS — I10 PRIMARY HYPERTENSION: ICD-10-CM

## 2025-07-16 DIAGNOSIS — E78.5 HYPERLIPIDEMIA, UNSPECIFIED HYPERLIPIDEMIA TYPE: ICD-10-CM

## 2025-07-16 NOTE — PROGRESS NOTES
Chronic care management outreach complete. Mr. Rick reports that he is doing well. Back pain has improved, he reports a 3/10 for pain level. Takes tramadol for pain. He would like a refill for tramadol. I informed him about his prescription compliance needed to be completed. He is aware.   Complaint with medications. Medications reviewed. No need fore any other refills.   Does not often monitors blood pressure. Encouraged monitoring blood pressures.   Up coming appointments reviewed.   Care Management Monthly Outreach  Chart review completed  Confirmation of at least 2 patient identifiers  Change in insurance? No    Has patient been to ER/Urgent Care since last outreach? No    Last Office Visit with PCP: 5/19/2025     APC Collaboration: n/a    Chronic Conditions and Outreach Summary:   Primary hypertension    Hyperlipidemia, unspecified hyperlipidemia type    Medications:   Are there medication changes since last visit? No  Refills needed? Yes - Tramadol.  Prescription compliance lab needs completed. Patient aware.     Social Drivers of Health: Deferred  Care Gaps Addressed? Deferred  Care Plan addressed: Yes    Upcoming Appointments:   Future Appointments       Date / Time Provider Department Dept Phone    9/5/2025 8:30 AM (Arrive by 8:15 AM) Stan Lopez MD [facility] [phone]    9/5/2025 10:20 AM Olga Alaniz MD [facility]  Arrive at: [facility] [phone]    10/8/2025 8:00 AM [facility] RN 1 [facility] [phone]    10/8/2025 8:30 AM Aleisha Duron MD [facility] [phone]          Blood Pressures Reviewed  BP Readings from Last 3 Encounters:   05/19/25 131/70   02/19/25 140/80   01/06/25 110/68     Labs Reviewed:  Lab Results   Component Value Date    CREATININE 1.19 04/19/2024    GLUCOSE 137 (H) 04/19/2024    ALKPHOS 67 04/19/2024    K 4.0 04/19/2024    PROT 6.5 04/19/2024     (L) 04/19/2024    CALCIUM  8.9 04/19/2024    AST 16 04/19/2024    ALT 17 04/19/2024    BUN 30 (H) 04/19/2024    MG 1.80 01/13/2024    PHOS 3.9 08/24/2023    GFRMALE 69 08/31/2023     Lab Results   Component Value Date    TRIG 91 10/15/2024    CHOL 133 10/15/2024    LDLCALC 66 10/15/2024    HDL 48.5 10/15/2024     Lab Results   Component Value Date    HGBA1C 5.4 02/12/2025    HGBA1C 5.6 10/15/2024    HGBA1C 5.6 08/22/2023     Lab Results   Component Value Date    WBC 6.9 04/19/2024    RBC 4.06 (L) 04/19/2024    HGB 12.4 (L) 04/19/2024     04/19/2024   No other concerns at this time.  Agreeable to continue monthly outreaches.  Encouraged to call if questions or concerns arise.    Eladio Rabago

## 2025-07-25 ENCOUNTER — TELEPHONE (OUTPATIENT)
Dept: PRIMARY CARE | Facility: CLINIC | Age: 80
End: 2025-07-25
Payer: MEDICARE

## 2025-07-25 DIAGNOSIS — G89.29 OTHER CHRONIC PAIN: ICD-10-CM

## 2025-07-25 DIAGNOSIS — F11.90 OPIOID USE: ICD-10-CM

## 2025-07-25 DIAGNOSIS — Z79.891 OPIOID USE AGREEMENT EXISTS: ICD-10-CM

## 2025-07-25 RX ORDER — TRAMADOL HYDROCHLORIDE 50 MG/1
50 TABLET, FILM COATED ORAL EVERY 12 HOURS
Qty: 60 TABLET | Refills: 0 | Status: SHIPPED | OUTPATIENT
Start: 2025-07-25 | End: 2025-08-24

## 2025-08-04 DIAGNOSIS — J30.9 ALLERGIC RHINITIS, UNSPECIFIED SEASONALITY, UNSPECIFIED TRIGGER: ICD-10-CM

## 2025-08-05 RX ORDER — MONTELUKAST SODIUM 10 MG/1
10 TABLET ORAL DAILY
Qty: 90 TABLET | Refills: 2 | Status: SHIPPED | OUTPATIENT
Start: 2025-08-05

## 2025-08-15 ENCOUNTER — PATIENT OUTREACH (OUTPATIENT)
Dept: PRIMARY CARE | Facility: CLINIC | Age: 80
End: 2025-08-15
Payer: MEDICARE

## 2025-08-15 DIAGNOSIS — E78.5 HYPERLIPIDEMIA, UNSPECIFIED HYPERLIPIDEMIA TYPE: ICD-10-CM

## 2025-08-15 DIAGNOSIS — I10 PRIMARY HYPERTENSION: ICD-10-CM

## 2025-08-22 LAB
1OH-MIDAZOLAM UR-MCNC: ABNORMAL NG/ML
7AMINOCLONAZEPAM UR-MCNC: ABNORMAL NG/ML
A-OH ALPRAZ UR-MCNC: ABNORMAL NG/ML
A-OH-TRIAZOLAM UR-MCNC: ABNORMAL NG/ML
AMPHETAMINES UR QL: NEGATIVE NG/ML
BARBITURATES UR QL: NEGATIVE NG/ML
BZE UR QL: NEGATIVE NG/ML
CODEINE UR-MCNC: NEGATIVE NG/ML
CREAT UR-MCNC: 103.6 MG/DL
DRUG SCREEN COMMENT UR-IMP: ABNORMAL
EDDP UR-MCNC: ABNORMAL NG/ML
FENTANYL UR-MCNC: ABNORMAL NG/ML
HYDROCODONE UR-MCNC: NEGATIVE NG/ML
HYDROMORPHONE UR-MCNC: NEGATIVE NG/ML
LORAZEPAM UR-MCNC: ABNORMAL NG/ML
METHADONE UR-MCNC: ABNORMAL UG/L
MORPHINE UR-MCNC: NEGATIVE NG/ML
NORDIAZEPAM UR-MCNC: ABNORMAL NG/ML
NORFENTANYL UR-MCNC: ABNORMAL NG/ML
NORHYDROCODONE UR CFM-MCNC: NEGATIVE NG/ML
NOROXYCODONE UR CFM-MCNC: NEGATIVE NG/ML
NORTRAMADOL UR-MCNC: 345 NG/ML
OH-ETHYLFLURAZ UR-MCNC: ABNORMAL NG/ML
OXAZEPAM UR-MCNC: ABNORMAL UG/ML
OXIDANTS UR QL: NEGATIVE MCG/ML
OXYCODONE UR CFM-MCNC: NEGATIVE NG/ML
OXYMORPHONE UR CFM-MCNC: NEGATIVE NG/ML
PCP UR QL: NEGATIVE NG/ML
PH UR: 7.3 [PH] (ref 4.5–9)
QUEST 6 ACETYLMORPHINE: ABNORMAL
QUEST NOTES AND COMMENTS: ABNORMAL
QUEST PATIENT HISTORICAL REPORT: ABNORMAL
QUEST ZOLPIDEM: ABNORMAL
TEMAZEPAM UR-MCNC: ABNORMAL NG/ML
THC UR QL: NEGATIVE NG/ML
TRAMADOL UR-MCNC: 5044 NG/ML
ZOLPIDEM PHENYL-4-CARB UR CFM-MCNC: ABNORMAL NG/ML

## 2025-08-25 ENCOUNTER — APPOINTMENT (OUTPATIENT)
Dept: PRIMARY CARE | Facility: CLINIC | Age: 80
End: 2025-08-25
Payer: MEDICARE

## 2025-08-25 VITALS
WEIGHT: 190 LBS | OXYGEN SATURATION: 95 % | HEART RATE: 55 BPM | BODY MASS INDEX: 25.07 KG/M2 | SYSTOLIC BLOOD PRESSURE: 129 MMHG | DIASTOLIC BLOOD PRESSURE: 80 MMHG

## 2025-08-25 DIAGNOSIS — G47.33 OSA (OBSTRUCTIVE SLEEP APNEA): ICD-10-CM

## 2025-08-25 DIAGNOSIS — Z79.891 OPIOID USE AGREEMENT EXISTS: ICD-10-CM

## 2025-08-25 DIAGNOSIS — G89.4 CHRONIC PAIN SYNDROME: ICD-10-CM

## 2025-08-25 DIAGNOSIS — M48.062 SPINAL STENOSIS OF LUMBAR REGION WITH NEUROGENIC CLAUDICATION: ICD-10-CM

## 2025-08-25 DIAGNOSIS — G89.29 OTHER CHRONIC PAIN: ICD-10-CM

## 2025-08-25 DIAGNOSIS — M48.061 DEGENERATIVE LUMBAR SPINAL STENOSIS: ICD-10-CM

## 2025-08-25 DIAGNOSIS — Z23 NEED FOR PNEUMOCOCCAL 20-VALENT CONJUGATE VACCINATION: ICD-10-CM

## 2025-08-25 DIAGNOSIS — K22.70 BARRETT'S ESOPHAGUS WITHOUT DYSPLASIA: ICD-10-CM

## 2025-08-25 DIAGNOSIS — E78.5 HYPERLIPIDEMIA, UNSPECIFIED HYPERLIPIDEMIA TYPE: ICD-10-CM

## 2025-08-25 DIAGNOSIS — Z96.89 SPINAL CORD STIMULATOR STATUS: ICD-10-CM

## 2025-08-25 DIAGNOSIS — F11.90 OPIOID USE: ICD-10-CM

## 2025-08-25 DIAGNOSIS — D69.2 OTHER NONTHROMBOCYTOPENIC PURPURA: ICD-10-CM

## 2025-08-25 DIAGNOSIS — I10 PRIMARY HYPERTENSION: Primary | ICD-10-CM

## 2025-08-25 PROCEDURE — 1159F MED LIST DOCD IN RCRD: CPT | Performed by: INTERNAL MEDICINE

## 2025-08-25 PROCEDURE — G2211 COMPLEX E/M VISIT ADD ON: HCPCS | Performed by: INTERNAL MEDICINE

## 2025-08-25 PROCEDURE — 3079F DIAST BP 80-89 MM HG: CPT | Performed by: INTERNAL MEDICINE

## 2025-08-25 PROCEDURE — 90677 PCV20 VACCINE IM: CPT | Performed by: INTERNAL MEDICINE

## 2025-08-25 PROCEDURE — 3074F SYST BP LT 130 MM HG: CPT | Performed by: INTERNAL MEDICINE

## 2025-08-25 PROCEDURE — 99214 OFFICE O/P EST MOD 30 MIN: CPT | Performed by: INTERNAL MEDICINE

## 2025-08-25 PROCEDURE — G0009 ADMIN PNEUMOCOCCAL VACCINE: HCPCS | Performed by: INTERNAL MEDICINE

## 2025-08-25 PROCEDURE — 1036F TOBACCO NON-USER: CPT | Performed by: INTERNAL MEDICINE

## 2025-08-25 PROCEDURE — 1160F RVW MEDS BY RX/DR IN RCRD: CPT | Performed by: INTERNAL MEDICINE

## 2025-08-25 RX ORDER — TRAMADOL HYDROCHLORIDE 50 MG/1
50 TABLET, FILM COATED ORAL EVERY 12 HOURS
Qty: 60 TABLET | Refills: 0 | Status: SHIPPED | OUTPATIENT
Start: 2025-08-25 | End: 2025-08-25 | Stop reason: SDUPTHER

## 2025-08-25 RX ORDER — TRAMADOL HYDROCHLORIDE 50 MG/1
50 TABLET, FILM COATED ORAL EVERY 12 HOURS
Qty: 60 TABLET | Refills: 0 | Status: SHIPPED | OUTPATIENT
Start: 2025-08-25 | End: 2025-09-24

## 2025-08-25 ASSESSMENT — ENCOUNTER SYMPTOMS: CONSTITUTIONAL NEGATIVE: 1

## 2025-08-27 LAB
1OH-MIDAZOLAM UR-MCNC: NEGATIVE NG/ML
7AMINOCLONAZEPAM UR-MCNC: NEGATIVE NG/ML
A-OH ALPRAZ UR-MCNC: NEGATIVE NG/ML
A-OH-TRIAZOLAM UR-MCNC: NEGATIVE NG/ML
AMPHETAMINES UR QL: NEGATIVE NG/ML
BARBITURATES UR QL: NEGATIVE NG/ML
BZE UR QL: NEGATIVE NG/ML
CODEINE UR-MCNC: NEGATIVE NG/ML
CREAT UR-MCNC: 103.6 MG/DL
DRUG SCREEN COMMENT UR-IMP: ABNORMAL
EDDP UR-MCNC: NEGATIVE NG/ML
FENTANYL UR-MCNC: NEGATIVE NG/ML
HYDROCODONE UR-MCNC: NEGATIVE NG/ML
HYDROMORPHONE UR-MCNC: NEGATIVE NG/ML
LORAZEPAM UR-MCNC: NEGATIVE NG/ML
METHADONE UR-MCNC: NEGATIVE NG/ML
MORPHINE UR-MCNC: NEGATIVE NG/ML
NORDIAZEPAM UR-MCNC: NEGATIVE NG/ML
NORFENTANYL UR-MCNC: NEGATIVE NG/ML
NORHYDROCODONE UR CFM-MCNC: NEGATIVE NG/ML
NOROXYCODONE UR CFM-MCNC: NEGATIVE NG/ML
NORTRAMADOL UR-MCNC: 345 NG/ML
OH-ETHYLFLURAZ UR-MCNC: NEGATIVE NG/ML
OXAZEPAM UR-MCNC: NEGATIVE NG/ML
OXIDANTS UR QL: NEGATIVE MCG/ML
OXYCODONE UR CFM-MCNC: NEGATIVE NG/ML
OXYMORPHONE UR CFM-MCNC: NEGATIVE NG/ML
PCP UR QL: NEGATIVE NG/ML
PH UR: 7.3 [PH] (ref 4.5–9)
QUEST 6 ACETYLMORPHINE: NEGATIVE NG/ML
QUEST NOTES AND COMMENTS: ABNORMAL
QUEST ZOLPIDEM: NEGATIVE NG/ML
TEMAZEPAM UR-MCNC: NEGATIVE NG/ML
THC UR QL: NEGATIVE NG/ML
TRAMADOL UR-MCNC: 5044 NG/ML
ZOLPIDEM PHENYL-4-CARB UR CFM-MCNC: NEGATIVE NG/ML

## 2025-09-05 ENCOUNTER — OFFICE VISIT (OUTPATIENT)
Dept: SLEEP MEDICINE | Facility: CLINIC | Age: 80
End: 2025-09-05
Payer: MEDICARE

## 2025-09-05 ENCOUNTER — APPOINTMENT (OUTPATIENT)
Dept: UROLOGY | Facility: CLINIC | Age: 80
End: 2025-09-05
Payer: MEDICARE

## 2025-09-05 VITALS
WEIGHT: 194.6 LBS | SYSTOLIC BLOOD PRESSURE: 111 MMHG | DIASTOLIC BLOOD PRESSURE: 68 MMHG | HEART RATE: 60 BPM | BODY MASS INDEX: 25.67 KG/M2 | TEMPERATURE: 98 F

## 2025-09-05 DIAGNOSIS — F51.04 CHRONIC INSOMNIA: ICD-10-CM

## 2025-09-05 DIAGNOSIS — G47.33 OSA (OBSTRUCTIVE SLEEP APNEA): Primary | ICD-10-CM

## 2025-09-05 PROCEDURE — 1036F TOBACCO NON-USER: CPT | Performed by: STUDENT IN AN ORGANIZED HEALTH CARE EDUCATION/TRAINING PROGRAM

## 2025-09-05 PROCEDURE — 1160F RVW MEDS BY RX/DR IN RCRD: CPT | Performed by: STUDENT IN AN ORGANIZED HEALTH CARE EDUCATION/TRAINING PROGRAM

## 2025-09-05 PROCEDURE — 95976 ALYS SMPL CN NPGT PRGRMG: CPT | Performed by: STUDENT IN AN ORGANIZED HEALTH CARE EDUCATION/TRAINING PROGRAM

## 2025-09-05 PROCEDURE — 3074F SYST BP LT 130 MM HG: CPT | Performed by: STUDENT IN AN ORGANIZED HEALTH CARE EDUCATION/TRAINING PROGRAM

## 2025-09-05 PROCEDURE — 1159F MED LIST DOCD IN RCRD: CPT | Performed by: STUDENT IN AN ORGANIZED HEALTH CARE EDUCATION/TRAINING PROGRAM

## 2025-09-05 PROCEDURE — 3078F DIAST BP <80 MM HG: CPT | Performed by: STUDENT IN AN ORGANIZED HEALTH CARE EDUCATION/TRAINING PROGRAM

## 2025-09-05 PROCEDURE — G2211 COMPLEX E/M VISIT ADD ON: HCPCS | Performed by: STUDENT IN AN ORGANIZED HEALTH CARE EDUCATION/TRAINING PROGRAM

## 2025-09-05 PROCEDURE — 99214 OFFICE O/P EST MOD 30 MIN: CPT | Performed by: STUDENT IN AN ORGANIZED HEALTH CARE EDUCATION/TRAINING PROGRAM

## 2026-09-04 ENCOUNTER — APPOINTMENT (OUTPATIENT)
Dept: SLEEP MEDICINE | Facility: CLINIC | Age: 81
End: 2026-09-04
Payer: MEDICARE

## (undated) DEVICE — GOWN, SURGICAL, SMARTGOWN, XLARGE, STERILE

## (undated) DEVICE — SYRINGE, 10 CC, LUER LOCK

## (undated) DEVICE — GOWN, SURGICAL, SIRUS, NON REINFORCED, LARGE

## (undated) DEVICE — GLOVE, SURGICAL, PROTEXIS PI , 6.5, PF, LF

## (undated) DEVICE — GLOVE, SURGICAL, PROTEXIS PI , 7.5, PF, LF

## (undated) DEVICE — CATHETER, IV PROTECTIVE, 20 G X 1.25 IN

## (undated) DEVICE — BAG, DRAINAGE, CONTINUOUS IRRIGATION, 4L

## (undated) DEVICE — ELECTRODE, TRI NEEDLE, GREEN 15X1000MM

## (undated) DEVICE — 36CM CODMAN MALLEABLE CATHETER PASSER, STERILE, DISPOSABLE

## (undated) DEVICE — SPONGE, DISSECTING, PEANUT, 3/8 IN, XRAY DETECTABLE

## (undated) DEVICE — ASCOPE 4, RHINOLARYNGO SLIM, SINGLE USE, STERILE

## (undated) DEVICE — BLANKET, LOWER BODY, VHA PLUS, ADULT

## (undated) DEVICE — ADHESIVE, SKIN, MASTISOL, 2/3 CC VIAL

## (undated) DEVICE — Device

## (undated) DEVICE — FIBER, MOXY, LIQUID COOLED

## (undated) DEVICE — DRAPE, U-DRAPE, NON STERILE

## (undated) DEVICE — SUTURE, VICRYL, 3-0, 27 IN, SH

## (undated) DEVICE — BAG, DRAINAGE, ANTI-REFLUX CHAMBER, 2000ML

## (undated) DEVICE — SOLUTION, IRRIGATION, X RX SODIUM CHL 0.9%, 1000ML BTL

## (undated) DEVICE — SUTURE, SILK, 2-0, 30 IN, SH, BLACK

## (undated) DEVICE — SUTURE, MONOCRYL, 4-0, 27 IN, PS-2, UNDYED

## (undated) DEVICE — WATER, STERILE, FOR IRRIGATION USP, 500MI AQUALITE

## (undated) DEVICE — COVER, EQUIPMENT, FLUOROSCAN, W/2 ADHESIVE STRIPS, STERI DRAPE, 35 X 43 IN, DISPOSABLE, STERILE

## (undated) DEVICE — CATHETER, URETHRAL, FOLEY, 2 WAY, BARDEX LUBRICATH, SHORT LENGTH, 22 FR, 5 CC, LATEX

## (undated) DEVICE — IRRIGATION SET, CYSTOSCOPY, F/CONSTANT/INTERMITTENT, 8 GTT/CC, 77 IN

## (undated) DEVICE — PREP TRAY, W/SOLUTION, GLOVES, APPLICATORS, POVIDONE IODINE

## (undated) DEVICE — NEEDLE, ELECTRODE, ELECTROSURGICAL, INSULATED

## (undated) DEVICE — IRRIGATION SET, CYSTOSCOPY, TURP, Y, CONTINUOUS, 81 IN

## (undated) DEVICE — SLEEP REMOTE, INSPIRE, MODEL 2580

## (undated) DEVICE — DRAPE, INCISE, ANTIMICROBIAL, IOBAN 2, STERI DRAPE, 23 X 33 IN, DISPOSABLE, STERILE

## (undated) DEVICE — CATHETER, LASER URETERAL, 7.1FR, 40CM

## (undated) DEVICE — SYRINGE, 50 CC, IRRIGATION, CATHETER TIP, DISPOSABLE, STERILE, LF

## (undated) DEVICE — VESSEL LOOP, WHITE MINI, 2 CARD

## (undated) DEVICE — NEEDLE, INJECTOR, FLEX CYSTO, SIINGLE USE

## (undated) DEVICE — SPONGE, GAUZE, AVANT, STERILE, NONWOVEN, 4PLY, 4 X 4, STANDARD

## (undated) DEVICE — STRIP, SKIN CLOSURE, STERI STRIP, REINFORCED, 0.5 X 4 IN

## (undated) DEVICE — PREP TRAY, VAGINAL, SKIN SCRUB, PVP-1 PAINT & 110ML PV, LF

## (undated) DEVICE — DRESSING, TRANSPARENT, TEGADERM, 2-3/8 X 2-3/4 IN

## (undated) DEVICE — LUBRICANT, SURGILUBE, STERILE, 2OZ

## (undated) DEVICE — DRESSING, NON-ADHERENT, CURAD, ABSORBENT, 3 X 8 IN, STERILE

## (undated) DEVICE — SUTURE, SILK, 2-0, 30 IN, RB-1, BLACK

## (undated) DEVICE — MARKER, SURGICAL, SKIN, REG TIP, W/ RULER & LABELS